# Patient Record
Sex: FEMALE | Race: WHITE | Employment: OTHER | URBAN - NONMETROPOLITAN AREA
[De-identification: names, ages, dates, MRNs, and addresses within clinical notes are randomized per-mention and may not be internally consistent; named-entity substitution may affect disease eponyms.]

---

## 2017-04-04 ENCOUNTER — APPOINTMENT (OUTPATIENT)
Dept: GENERAL RADIOLOGY | Age: 72
End: 2017-04-04
Attending: ANESTHESIOLOGY
Payer: MEDICARE

## 2017-04-04 ENCOUNTER — HOSPITAL ENCOUNTER (OUTPATIENT)
Age: 72
Setting detail: OUTPATIENT SURGERY
Discharge: HOME | End: 2017-04-04
Attending: ANESTHESIOLOGY | Admitting: ANESTHESIOLOGY
Payer: MEDICARE

## 2017-04-04 VITALS
SYSTOLIC BLOOD PRESSURE: 148 MMHG | WEIGHT: 145 LBS | HEART RATE: 74 BPM | OXYGEN SATURATION: 95 % | TEMPERATURE: 97.3 F | RESPIRATION RATE: 16 BRPM | HEIGHT: 63 IN | DIASTOLIC BLOOD PRESSURE: 78 MMHG | BODY MASS INDEX: 25.69 KG/M2

## 2017-04-04 PROCEDURE — 3600000002 HC SURGERY LEVEL 2 BASE: Performed by: ANESTHESIOLOGY

## 2017-04-04 PROCEDURE — 6360000002 HC RX W HCPCS: Performed by: ANESTHESIOLOGY

## 2017-04-04 PROCEDURE — 7100000010 HC PHASE II RECOVERY - FIRST 15 MIN: Performed by: ANESTHESIOLOGY

## 2017-04-04 PROCEDURE — 2500000003 HC RX 250 WO HCPCS: Performed by: ANESTHESIOLOGY

## 2017-04-04 PROCEDURE — 7100000011 HC PHASE II RECOVERY - ADDTL 15 MIN: Performed by: ANESTHESIOLOGY

## 2017-04-04 PROCEDURE — 77002 NEEDLE LOCALIZATION BY XRAY: CPT

## 2017-04-04 PROCEDURE — 2580000003 HC RX 258: Performed by: ANESTHESIOLOGY

## 2017-04-04 RX ORDER — BUPIVACAINE HYDROCHLORIDE 5 MG/ML
INJECTION, SOLUTION EPIDURAL; INTRACAUDAL PRN
Status: DISCONTINUED | OUTPATIENT
Start: 2017-04-04 | End: 2017-04-04 | Stop reason: HOSPADM

## 2017-04-04 RX ORDER — METHYLPREDNISOLONE ACETATE 40 MG/ML
INJECTION, SUSPENSION INTRA-ARTICULAR; INTRALESIONAL; INTRAMUSCULAR; SOFT TISSUE PRN
Status: DISCONTINUED | OUTPATIENT
Start: 2017-04-04 | End: 2017-04-04 | Stop reason: HOSPADM

## 2017-04-04 RX ORDER — MIDAZOLAM HYDROCHLORIDE 1 MG/ML
INJECTION INTRAMUSCULAR; INTRAVENOUS PRN
Status: DISCONTINUED | OUTPATIENT
Start: 2017-04-04 | End: 2017-04-04 | Stop reason: HOSPADM

## 2017-04-04 RX ORDER — SODIUM CHLORIDE, SODIUM LACTATE, POTASSIUM CHLORIDE, CALCIUM CHLORIDE 600; 310; 30; 20 MG/100ML; MG/100ML; MG/100ML; MG/100ML
INJECTION, SOLUTION INTRAVENOUS CONTINUOUS
Status: DISCONTINUED | OUTPATIENT
Start: 2017-04-04 | End: 2017-04-04 | Stop reason: HOSPADM

## 2017-04-04 RX ORDER — LIDOCAINE HYDROCHLORIDE 10 MG/ML
INJECTION, SOLUTION EPIDURAL; INFILTRATION; INTRACAUDAL; PERINEURAL PRN
Status: DISCONTINUED | OUTPATIENT
Start: 2017-04-04 | End: 2017-04-04 | Stop reason: HOSPADM

## 2017-04-04 RX ORDER — SODIUM CHLORIDE 0.9 % (FLUSH) 0.9 %
10 SYRINGE (ML) INJECTION PRN
Status: DISCONTINUED | OUTPATIENT
Start: 2017-04-04 | End: 2017-04-04 | Stop reason: HOSPADM

## 2017-04-04 RX ORDER — SODIUM CHLORIDE 0.9 % (FLUSH) 0.9 %
10 SYRINGE (ML) INJECTION EVERY 12 HOURS SCHEDULED
Status: DISCONTINUED | OUTPATIENT
Start: 2017-04-04 | End: 2017-04-04 | Stop reason: HOSPADM

## 2017-04-04 RX ADMIN — SODIUM CHLORIDE, POTASSIUM CHLORIDE, SODIUM LACTATE AND CALCIUM CHLORIDE: 600; 310; 30; 20 INJECTION, SOLUTION INTRAVENOUS at 15:02

## 2017-04-04 ASSESSMENT — PAIN DESCRIPTION - LOCATION: LOCATION: HIP

## 2017-04-04 ASSESSMENT — PAIN - FUNCTIONAL ASSESSMENT: PAIN_FUNCTIONAL_ASSESSMENT: 0-10

## 2017-04-04 ASSESSMENT — PAIN DESCRIPTION - DESCRIPTORS: DESCRIPTORS: ACHING

## 2017-04-04 ASSESSMENT — PAIN SCALES - GENERAL
PAINLEVEL_OUTOF10: 0
PAINLEVEL_OUTOF10: 0
PAINLEVEL_OUTOF10: 8

## 2017-04-04 ASSESSMENT — PAIN DESCRIPTION - ORIENTATION: ORIENTATION: RIGHT

## 2017-04-04 ASSESSMENT — PAIN DESCRIPTION - PAIN TYPE: TYPE: CHRONIC PAIN

## 2021-08-15 ENCOUNTER — APPOINTMENT (OUTPATIENT)
Dept: GENERAL RADIOLOGY | Age: 76
DRG: 563 | End: 2021-08-15
Payer: MEDICARE

## 2021-08-15 ENCOUNTER — HOSPITAL ENCOUNTER (INPATIENT)
Age: 76
LOS: 1 days | Discharge: NURSING, REHAB OR LONG-TERM CARE | DRG: 563 | End: 2021-08-19
Attending: FAMILY MEDICINE | Admitting: FAMILY MEDICINE
Payer: MEDICARE

## 2021-08-15 DIAGNOSIS — S42.91XA CLOSED FRACTURE OF RIGHT SHOULDER, INITIAL ENCOUNTER: ICD-10-CM

## 2021-08-15 DIAGNOSIS — S43.004A SHOULDER DISLOCATION, RIGHT, INITIAL ENCOUNTER: ICD-10-CM

## 2021-08-15 DIAGNOSIS — S43.004A DISLOCATION OF RIGHT SHOULDER JOINT, INITIAL ENCOUNTER: Primary | ICD-10-CM

## 2021-08-15 PROCEDURE — 99284 EMERGENCY DEPT VISIT MOD MDM: CPT

## 2021-08-15 PROCEDURE — 73030 X-RAY EXAM OF SHOULDER: CPT

## 2021-08-15 PROCEDURE — 36415 COLL VENOUS BLD VENIPUNCTURE: CPT

## 2021-08-15 PROCEDURE — 80053 COMPREHEN METABOLIC PANEL: CPT

## 2021-08-15 PROCEDURE — 6360000002 HC RX W HCPCS: Performed by: FAMILY MEDICINE

## 2021-08-15 PROCEDURE — 85025 COMPLETE CBC W/AUTO DIFF WBC: CPT

## 2021-08-15 PROCEDURE — 85730 THROMBOPLASTIN TIME PARTIAL: CPT

## 2021-08-15 PROCEDURE — 96374 THER/PROPH/DIAG INJ IV PUSH: CPT

## 2021-08-15 PROCEDURE — 96375 TX/PRO/DX INJ NEW DRUG ADDON: CPT

## 2021-08-15 RX ORDER — MORPHINE SULFATE 2 MG/ML
2 INJECTION, SOLUTION INTRAMUSCULAR; INTRAVENOUS ONCE
Status: COMPLETED | OUTPATIENT
Start: 2021-08-16 | End: 2021-08-15

## 2021-08-15 RX ORDER — ONDANSETRON 2 MG/ML
4 INJECTION INTRAMUSCULAR; INTRAVENOUS ONCE
Status: COMPLETED | OUTPATIENT
Start: 2021-08-16 | End: 2021-08-15

## 2021-08-15 RX ADMIN — ONDANSETRON 4 MG: 2 INJECTION INTRAMUSCULAR; INTRAVENOUS at 23:52

## 2021-08-15 RX ADMIN — MORPHINE SULFATE 2 MG: 2 INJECTION, SOLUTION INTRAMUSCULAR; INTRAVENOUS at 23:52

## 2021-08-15 ASSESSMENT — PAIN SCALES - GENERAL
PAINLEVEL_OUTOF10: 10
PAINLEVEL_OUTOF10: 10

## 2021-08-15 ASSESSMENT — PAIN DESCRIPTION - PAIN TYPE: TYPE: ACUTE PAIN

## 2021-08-15 ASSESSMENT — PAIN DESCRIPTION - ORIENTATION: ORIENTATION: RIGHT

## 2021-08-15 ASSESSMENT — PAIN DESCRIPTION - LOCATION: LOCATION: SHOULDER

## 2021-08-16 ENCOUNTER — APPOINTMENT (OUTPATIENT)
Dept: GENERAL RADIOLOGY | Age: 76
DRG: 563 | End: 2021-08-16
Payer: MEDICARE

## 2021-08-16 ENCOUNTER — ANESTHESIA (OUTPATIENT)
Dept: OPERATING ROOM | Age: 76
DRG: 563 | End: 2021-08-16
Payer: MEDICARE

## 2021-08-16 ENCOUNTER — ANESTHESIA EVENT (OUTPATIENT)
Dept: OPERATING ROOM | Age: 76
DRG: 563 | End: 2021-08-16
Payer: MEDICARE

## 2021-08-16 VITALS — DIASTOLIC BLOOD PRESSURE: 60 MMHG | SYSTOLIC BLOOD PRESSURE: 115 MMHG | OXYGEN SATURATION: 93 %

## 2021-08-16 PROBLEM — E87.6 HYPOKALEMIA: Status: ACTIVE | Noted: 2021-08-16

## 2021-08-16 PROBLEM — S43.004A SHOULDER DISLOCATION, RIGHT, INITIAL ENCOUNTER: Status: ACTIVE | Noted: 2021-08-16

## 2021-08-16 PROBLEM — S43.006A SHOULDER JOINT DISLOCATION: Status: ACTIVE | Noted: 2021-08-16

## 2021-08-16 LAB
ABSOLUTE EOS #: 0.04 K/UL (ref 0–0.44)
ABSOLUTE IMMATURE GRANULOCYTE: 0.08 K/UL (ref 0–0.3)
ABSOLUTE LYMPH #: 1.35 K/UL (ref 1.1–3.7)
ABSOLUTE MONO #: 0.93 K/UL (ref 0.1–1.2)
ALBUMIN SERPL-MCNC: 4.6 G/DL (ref 3.5–5.2)
ALBUMIN/GLOBULIN RATIO: 1.7 (ref 1–2.5)
ALP BLD-CCNC: 73 U/L (ref 35–104)
ALT SERPL-CCNC: 17 U/L (ref 5–33)
ANION GAP SERPL CALCULATED.3IONS-SCNC: 18 MMOL/L (ref 9–17)
AST SERPL-CCNC: 32 U/L
BASOPHILS # BLD: 1 % (ref 0–2)
BASOPHILS ABSOLUTE: 0.03 K/UL (ref 0–0.2)
BILIRUB SERPL-MCNC: 0.26 MG/DL (ref 0.3–1.2)
BUN BLDV-MCNC: 16 MG/DL (ref 8–23)
BUN/CREAT BLD: 25 (ref 9–20)
CALCIUM SERPL-MCNC: 9.2 MG/DL (ref 8.6–10.4)
CHLORIDE BLD-SCNC: 91 MMOL/L (ref 98–107)
CO2: 20 MMOL/L (ref 20–31)
CREAT SERPL-MCNC: 0.64 MG/DL (ref 0.5–0.9)
DIFFERENTIAL TYPE: ABNORMAL
EKG ATRIAL RATE: 82 BPM
EKG P AXIS: 30 DEGREES
EKG P-R INTERVAL: 192 MS
EKG Q-T INTERVAL: 370 MS
EKG QRS DURATION: 86 MS
EKG QTC CALCULATION (BAZETT): 432 MS
EKG R AXIS: 86 DEGREES
EKG T AXIS: 74 DEGREES
EKG VENTRICULAR RATE: 82 BPM
EOSINOPHILS RELATIVE PERCENT: 1 % (ref 1–4)
GFR AFRICAN AMERICAN: >60 ML/MIN
GFR NON-AFRICAN AMERICAN: >60 ML/MIN
GFR SERPL CREATININE-BSD FRML MDRD: ABNORMAL ML/MIN/{1.73_M2}
GFR SERPL CREATININE-BSD FRML MDRD: ABNORMAL ML/MIN/{1.73_M2}
GLUCOSE BLD-MCNC: 112 MG/DL (ref 70–99)
HCT VFR BLD CALC: 36.2 % (ref 36.3–47.1)
HEMOGLOBIN: 12.2 G/DL (ref 11.9–15.1)
IMMATURE GRANULOCYTES: 1 %
LYMPHOCYTES # BLD: 21 % (ref 24–43)
MCH RBC QN AUTO: 32.4 PG (ref 25.2–33.5)
MCHC RBC AUTO-ENTMCNC: 33.7 G/DL (ref 28.4–34.8)
MCV RBC AUTO: 96.3 FL (ref 82.6–102.9)
MONOCYTES # BLD: 14 % (ref 3–12)
NRBC AUTOMATED: 0 PER 100 WBC
PARTIAL THROMBOPLASTIN TIME: 29.2 SEC (ref 23.9–33.8)
PDW BLD-RTO: 11.6 % (ref 11.8–14.4)
PLATELET # BLD: 244 K/UL (ref 138–453)
PLATELET ESTIMATE: ABNORMAL
PMV BLD AUTO: 8.9 FL (ref 8.1–13.5)
POTASSIUM SERPL-SCNC: 3.5 MMOL/L (ref 3.7–5.3)
RBC # BLD: 3.76 M/UL (ref 3.95–5.11)
RBC # BLD: ABNORMAL 10*6/UL
SEG NEUTROPHILS: 62 % (ref 36–65)
SEGMENTED NEUTROPHILS ABSOLUTE COUNT: 4.08 K/UL (ref 1.5–8.1)
SODIUM BLD-SCNC: 129 MMOL/L (ref 135–144)
TOTAL PROTEIN: 7.3 G/DL (ref 6.4–8.3)
WBC # BLD: 6.5 K/UL (ref 3.5–11.3)
WBC # BLD: ABNORMAL 10*3/UL

## 2021-08-16 PROCEDURE — 6360000002 HC RX W HCPCS: Performed by: INTERNAL MEDICINE

## 2021-08-16 PROCEDURE — 3700000001 HC ADD 15 MINUTES (ANESTHESIA): Performed by: ORTHOPAEDIC SURGERY

## 2021-08-16 PROCEDURE — 94761 N-INVAS EAR/PLS OXIMETRY MLT: CPT

## 2021-08-16 PROCEDURE — 7100000010 HC PHASE II RECOVERY - FIRST 15 MIN: Performed by: ORTHOPAEDIC SURGERY

## 2021-08-16 PROCEDURE — 3600000002 HC SURGERY LEVEL 2 BASE: Performed by: ORTHOPAEDIC SURGERY

## 2021-08-16 PROCEDURE — 3E0T3BZ INTRODUCTION OF ANESTHETIC AGENT INTO PERIPHERAL NERVES AND PLEXI, PERCUTANEOUS APPROACH: ICD-10-PCS | Performed by: ORTHOPAEDIC SURGERY

## 2021-08-16 PROCEDURE — 3600000012 HC SURGERY LEVEL 2 ADDTL 15MIN: Performed by: ORTHOPAEDIC SURGERY

## 2021-08-16 PROCEDURE — 6360000002 HC RX W HCPCS

## 2021-08-16 PROCEDURE — 7100000011 HC PHASE II RECOVERY - ADDTL 15 MIN: Performed by: ORTHOPAEDIC SURGERY

## 2021-08-16 PROCEDURE — 2580000003 HC RX 258: Performed by: NURSE ANESTHETIST, CERTIFIED REGISTERED

## 2021-08-16 PROCEDURE — 2500000003 HC RX 250 WO HCPCS: Performed by: INTERNAL MEDICINE

## 2021-08-16 PROCEDURE — G0378 HOSPITAL OBSERVATION PER HR: HCPCS

## 2021-08-16 PROCEDURE — 96375 TX/PRO/DX INJ NEW DRUG ADDON: CPT

## 2021-08-16 PROCEDURE — 0RSJXZZ REPOSITION RIGHT SHOULDER JOINT, EXTERNAL APPROACH: ICD-10-PCS | Performed by: ORTHOPAEDIC SURGERY

## 2021-08-16 PROCEDURE — 2580000003 HC RX 258: Performed by: FAMILY MEDICINE

## 2021-08-16 PROCEDURE — 6370000000 HC RX 637 (ALT 250 FOR IP): Performed by: FAMILY MEDICINE

## 2021-08-16 PROCEDURE — 6360000002 HC RX W HCPCS: Performed by: FAMILY MEDICINE

## 2021-08-16 PROCEDURE — 93005 ELECTROCARDIOGRAM TRACING: CPT | Performed by: FAMILY MEDICINE

## 2021-08-16 PROCEDURE — 6370000000 HC RX 637 (ALT 250 FOR IP): Performed by: INTERNAL MEDICINE

## 2021-08-16 PROCEDURE — 96376 TX/PRO/DX INJ SAME DRUG ADON: CPT

## 2021-08-16 PROCEDURE — 2500000003 HC RX 250 WO HCPCS: Performed by: NURSE ANESTHETIST, CERTIFIED REGISTERED

## 2021-08-16 PROCEDURE — 71045 X-RAY EXAM CHEST 1 VIEW: CPT

## 2021-08-16 PROCEDURE — 6360000002 HC RX W HCPCS: Performed by: NURSE ANESTHETIST, CERTIFIED REGISTERED

## 2021-08-16 PROCEDURE — 3209999900 FLUORO FOR SURGICAL PROCEDURES

## 2021-08-16 PROCEDURE — 2500000003 HC RX 250 WO HCPCS: Performed by: FAMILY MEDICINE

## 2021-08-16 PROCEDURE — 3700000000 HC ANESTHESIA ATTENDED CARE: Performed by: ORTHOPAEDIC SURGERY

## 2021-08-16 PROCEDURE — 93010 ELECTROCARDIOGRAM REPORT: CPT | Performed by: FAMILY MEDICINE

## 2021-08-16 RX ORDER — ONDANSETRON 2 MG/ML
4 INJECTION INTRAMUSCULAR; INTRAVENOUS EVERY 4 HOURS PRN
Status: DISCONTINUED | OUTPATIENT
Start: 2021-08-16 | End: 2021-08-19 | Stop reason: HOSPADM

## 2021-08-16 RX ORDER — METOPROLOL TARTRATE 5 MG/5ML
5 INJECTION INTRAVENOUS EVERY 6 HOURS
Status: DISCONTINUED | OUTPATIENT
Start: 2021-08-16 | End: 2021-08-16

## 2021-08-16 RX ORDER — SODIUM CHLORIDE 0.9 % (FLUSH) 0.9 %
5-40 SYRINGE (ML) INJECTION EVERY 12 HOURS SCHEDULED
Status: DISCONTINUED | OUTPATIENT
Start: 2021-08-16 | End: 2021-08-19 | Stop reason: HOSPADM

## 2021-08-16 RX ORDER — POTASSIUM CHLORIDE 20 MEQ/1
40 TABLET, EXTENDED RELEASE ORAL PRN
Status: DISCONTINUED | OUTPATIENT
Start: 2021-08-16 | End: 2021-08-19 | Stop reason: HOSPADM

## 2021-08-16 RX ORDER — ONDANSETRON 2 MG/ML
4 INJECTION INTRAMUSCULAR; INTRAVENOUS EVERY 6 HOURS PRN
Status: DISCONTINUED | OUTPATIENT
Start: 2021-08-16 | End: 2021-08-16

## 2021-08-16 RX ORDER — SODIUM CHLORIDE 0.9 % (FLUSH) 0.9 %
5-40 SYRINGE (ML) INJECTION PRN
Status: DISCONTINUED | OUTPATIENT
Start: 2021-08-16 | End: 2021-08-19 | Stop reason: HOSPADM

## 2021-08-16 RX ORDER — PROPOFOL 10 MG/ML
INJECTION, EMULSION INTRAVENOUS CONTINUOUS PRN
Status: DISCONTINUED | OUTPATIENT
Start: 2021-08-16 | End: 2021-08-16 | Stop reason: SDUPTHER

## 2021-08-16 RX ORDER — MORPHINE SULFATE 2 MG/ML
INJECTION, SOLUTION INTRAMUSCULAR; INTRAVENOUS
Status: COMPLETED
Start: 2021-08-16 | End: 2021-08-16

## 2021-08-16 RX ORDER — DULOXETIN HYDROCHLORIDE 60 MG/1
60 CAPSULE, DELAYED RELEASE ORAL DAILY
Status: DISCONTINUED | OUTPATIENT
Start: 2021-08-16 | End: 2021-08-19 | Stop reason: HOSPADM

## 2021-08-16 RX ORDER — AMLODIPINE BESYLATE 5 MG/1
5 TABLET ORAL ONCE
Status: COMPLETED | OUTPATIENT
Start: 2021-08-16 | End: 2021-08-16

## 2021-08-16 RX ORDER — FENTANYL CITRATE 50 UG/ML
25 INJECTION, SOLUTION INTRAMUSCULAR; INTRAVENOUS ONCE
Status: COMPLETED | OUTPATIENT
Start: 2021-08-16 | End: 2021-08-16

## 2021-08-16 RX ORDER — DULOXETIN HYDROCHLORIDE 60 MG/1
60 CAPSULE, DELAYED RELEASE ORAL DAILY
COMMUNITY

## 2021-08-16 RX ORDER — ONDANSETRON 4 MG/1
4 TABLET, ORALLY DISINTEGRATING ORAL EVERY 8 HOURS PRN
Status: DISCONTINUED | OUTPATIENT
Start: 2021-08-16 | End: 2021-08-19 | Stop reason: HOSPADM

## 2021-08-16 RX ORDER — SODIUM CHLORIDE 9 MG/ML
25 INJECTION, SOLUTION INTRAVENOUS PRN
Status: DISCONTINUED | OUTPATIENT
Start: 2021-08-16 | End: 2021-08-19 | Stop reason: HOSPADM

## 2021-08-16 RX ORDER — ONDANSETRON 2 MG/ML
4 INJECTION INTRAMUSCULAR; INTRAVENOUS
Status: DISCONTINUED | OUTPATIENT
Start: 2021-08-16 | End: 2021-08-16

## 2021-08-16 RX ORDER — LIDOCAINE HYDROCHLORIDE 20 MG/ML
INJECTION, SOLUTION EPIDURAL; INFILTRATION; INTRACAUDAL; PERINEURAL PRN
Status: DISCONTINUED | OUTPATIENT
Start: 2021-08-16 | End: 2021-08-16 | Stop reason: SDUPTHER

## 2021-08-16 RX ORDER — ACETAMINOPHEN 325 MG/1
650 TABLET ORAL EVERY 4 HOURS PRN
Status: DISCONTINUED | OUTPATIENT
Start: 2021-08-16 | End: 2021-08-19 | Stop reason: HOSPADM

## 2021-08-16 RX ORDER — MORPHINE SULFATE 4 MG/ML
4 INJECTION, SOLUTION INTRAMUSCULAR; INTRAVENOUS EVERY 4 HOURS PRN
Status: DISCONTINUED | OUTPATIENT
Start: 2021-08-16 | End: 2021-08-19 | Stop reason: HOSPADM

## 2021-08-16 RX ORDER — METOPROLOL TARTRATE 5 MG/5ML
5 INJECTION INTRAVENOUS EVERY 6 HOURS PRN
Status: DISCONTINUED | OUTPATIENT
Start: 2021-08-16 | End: 2021-08-19 | Stop reason: HOSPADM

## 2021-08-16 RX ORDER — LISINOPRIL AND HYDROCHLOROTHIAZIDE 25; 20 MG/1; MG/1
1 TABLET ORAL DAILY
Status: DISCONTINUED | OUTPATIENT
Start: 2021-08-16 | End: 2021-08-17

## 2021-08-16 RX ORDER — SODIUM CHLORIDE, SODIUM LACTATE, POTASSIUM CHLORIDE, CALCIUM CHLORIDE 600; 310; 30; 20 MG/100ML; MG/100ML; MG/100ML; MG/100ML
INJECTION, SOLUTION INTRAVENOUS CONTINUOUS PRN
Status: DISCONTINUED | OUTPATIENT
Start: 2021-08-16 | End: 2021-08-16 | Stop reason: SDUPTHER

## 2021-08-16 RX ORDER — SODIUM CHLORIDE, SODIUM LACTATE, POTASSIUM CHLORIDE, CALCIUM CHLORIDE 600; 310; 30; 20 MG/100ML; MG/100ML; MG/100ML; MG/100ML
INJECTION, SOLUTION INTRAVENOUS CONTINUOUS
Status: DISCONTINUED | OUTPATIENT
Start: 2021-08-16 | End: 2021-08-17

## 2021-08-16 RX ORDER — LISINOPRIL AND HYDROCHLOROTHIAZIDE 25; 20 MG/1; MG/1
1 TABLET ORAL DAILY
Status: ON HOLD | COMMUNITY
End: 2021-08-19 | Stop reason: HOSPADM

## 2021-08-16 RX ORDER — ONDANSETRON 4 MG/1
4 TABLET, ORALLY DISINTEGRATING ORAL EVERY 8 HOURS PRN
Status: DISCONTINUED | OUTPATIENT
Start: 2021-08-16 | End: 2021-08-16

## 2021-08-16 RX ORDER — DEXAMETHASONE SODIUM PHOSPHATE 10 MG/ML
INJECTION INTRAMUSCULAR; INTRAVENOUS PRN
Status: DISCONTINUED | OUTPATIENT
Start: 2021-08-16 | End: 2021-08-16

## 2021-08-16 RX ORDER — FENTANYL CITRATE 50 UG/ML
INJECTION, SOLUTION INTRAMUSCULAR; INTRAVENOUS PRN
Status: DISCONTINUED | OUTPATIENT
Start: 2021-08-16 | End: 2021-08-16 | Stop reason: SDUPTHER

## 2021-08-16 RX ORDER — POTASSIUM CHLORIDE 7.45 MG/ML
10 INJECTION INTRAVENOUS PRN
Status: DISCONTINUED | OUTPATIENT
Start: 2021-08-16 | End: 2021-08-19 | Stop reason: HOSPADM

## 2021-08-16 RX ORDER — SODIUM CHLORIDE 9 MG/ML
INJECTION, SOLUTION INTRAVENOUS CONTINUOUS
Status: DISCONTINUED | OUTPATIENT
Start: 2021-08-16 | End: 2021-08-16

## 2021-08-16 RX ORDER — ROPIVACAINE HYDROCHLORIDE 5 MG/ML
INJECTION, SOLUTION EPIDURAL; INFILTRATION; PERINEURAL PRN
Status: DISCONTINUED | OUTPATIENT
Start: 2021-08-16 | End: 2021-08-16 | Stop reason: SDUPTHER

## 2021-08-16 RX ORDER — DEXAMETHASONE SODIUM PHOSPHATE 10 MG/ML
INJECTION INTRAMUSCULAR; INTRAVENOUS PRN
Status: DISCONTINUED | OUTPATIENT
Start: 2021-08-16 | End: 2021-08-16 | Stop reason: SDUPTHER

## 2021-08-16 RX ORDER — POTASSIUM CHLORIDE AND SODIUM CHLORIDE 900; 300 MG/100ML; MG/100ML
INJECTION, SOLUTION INTRAVENOUS CONTINUOUS
Status: DISCONTINUED | OUTPATIENT
Start: 2021-08-16 | End: 2021-08-16

## 2021-08-16 RX ORDER — ENALAPRILAT 2.5 MG/2ML
1.25 INJECTION INTRAVENOUS EVERY 6 HOURS PRN
Status: DISCONTINUED | OUTPATIENT
Start: 2021-08-16 | End: 2021-08-19 | Stop reason: HOSPADM

## 2021-08-16 RX ORDER — AMLODIPINE BESYLATE 5 MG/1
5 TABLET ORAL DAILY
Status: DISCONTINUED | OUTPATIENT
Start: 2021-08-16 | End: 2021-08-16

## 2021-08-16 RX ORDER — MORPHINE SULFATE 2 MG/ML
2 INJECTION, SOLUTION INTRAMUSCULAR; INTRAVENOUS ONCE
Status: COMPLETED | OUTPATIENT
Start: 2021-08-16 | End: 2021-08-16

## 2021-08-16 RX ADMIN — ONDANSETRON 4 MG: 2 INJECTION INTRAMUSCULAR; INTRAVENOUS at 06:11

## 2021-08-16 RX ADMIN — ACETAMINOPHEN 650 MG: 325 TABLET ORAL at 17:11

## 2021-08-16 RX ADMIN — MORPHINE SULFATE 4 MG: 4 INJECTION, SOLUTION INTRAMUSCULAR; INTRAVENOUS at 06:12

## 2021-08-16 RX ADMIN — AMLODIPINE BESYLATE 5 MG: 5 TABLET ORAL at 17:48

## 2021-08-16 RX ADMIN — MORPHINE SULFATE 2 MG: 2 INJECTION, SOLUTION INTRAMUSCULAR; INTRAVENOUS at 00:04

## 2021-08-16 RX ADMIN — FENTANYL CITRATE 25 MCG: 50 INJECTION, SOLUTION INTRAMUSCULAR; INTRAVENOUS at 07:36

## 2021-08-16 RX ADMIN — POTASSIUM CHLORIDE 40 MEQ: 1500 TABLET, EXTENDED RELEASE ORAL at 11:46

## 2021-08-16 RX ADMIN — MORPHINE SULFATE 4 MG: 4 INJECTION, SOLUTION INTRAMUSCULAR; INTRAVENOUS at 23:57

## 2021-08-16 RX ADMIN — DEXAMETHASONE SODIUM PHOSPHATE 10 MG: 10 INJECTION INTRAMUSCULAR; INTRAVENOUS at 08:35

## 2021-08-16 RX ADMIN — SODIUM CHLORIDE: 9 INJECTION, SOLUTION INTRAVENOUS at 02:06

## 2021-08-16 RX ADMIN — SODIUM CHLORIDE, POTASSIUM CHLORIDE, SODIUM LACTATE AND CALCIUM CHLORIDE: 600; 310; 30; 20 INJECTION, SOLUTION INTRAVENOUS at 09:04

## 2021-08-16 RX ADMIN — LIDOCAINE HYDROCHLORIDE 3 ML: 20 INJECTION, SOLUTION EPIDURAL; INFILTRATION; INTRACAUDAL; PERINEURAL at 09:07

## 2021-08-16 RX ADMIN — ROPIVACAINE HYDROCHLORIDE 15 ML: 5 INJECTION, SOLUTION EPIDURAL; INFILTRATION; PERINEURAL at 08:35

## 2021-08-16 RX ADMIN — POTASSIUM CHLORIDE AND SODIUM CHLORIDE: 900; 300 INJECTION, SOLUTION INTRAVENOUS at 07:38

## 2021-08-16 RX ADMIN — MORPHINE SULFATE 4 MG: 4 INJECTION, SOLUTION INTRAMUSCULAR; INTRAVENOUS at 02:05

## 2021-08-16 RX ADMIN — ENALAPRILAT 1.25 MG: 1.25 INJECTION INTRAVENOUS at 20:41

## 2021-08-16 RX ADMIN — METOPROLOL TARTRATE 5 MG: 5 INJECTION INTRAVENOUS at 23:56

## 2021-08-16 RX ADMIN — ONDANSETRON 4 MG: 2 INJECTION INTRAMUSCULAR; INTRAVENOUS at 14:14

## 2021-08-16 RX ADMIN — FENTANYL CITRATE 25 MCG: 50 INJECTION, SOLUTION INTRAMUSCULAR; INTRAVENOUS at 03:46

## 2021-08-16 RX ADMIN — PROPOFOL 100 MCG/KG/MIN: 10 INJECTION, EMULSION INTRAVENOUS at 09:07

## 2021-08-16 RX ADMIN — ONDANSETRON 4 MG: 2 INJECTION INTRAMUSCULAR; INTRAVENOUS at 20:48

## 2021-08-16 RX ADMIN — METOPROLOL TARTRATE 5 MG: 5 INJECTION INTRAVENOUS at 15:42

## 2021-08-16 RX ADMIN — SODIUM CHLORIDE, POTASSIUM CHLORIDE, SODIUM LACTATE AND CALCIUM CHLORIDE: 600; 310; 30; 20 INJECTION, SOLUTION INTRAVENOUS at 11:40

## 2021-08-16 RX ADMIN — DULOXETINE HYDROCHLORIDE 60 MG: 60 CAPSULE, DELAYED RELEASE ORAL at 11:40

## 2021-08-16 RX ADMIN — FENTANYL CITRATE 50 MCG: 50 INJECTION, SOLUTION INTRAMUSCULAR; INTRAVENOUS at 08:26

## 2021-08-16 RX ADMIN — LISINOPRIL AND HYDROCHLOROTHIAZIDE 1 TABLET: 25; 20 TABLET ORAL at 11:40

## 2021-08-16 ASSESSMENT — PAIN SCALES - GENERAL
PAINLEVEL_OUTOF10: 0
PAINLEVEL_OUTOF10: 0
PAINLEVEL_OUTOF10: 10
PAINLEVEL_OUTOF10: 8
PAINLEVEL_OUTOF10: 3
PAINLEVEL_OUTOF10: 10
PAINLEVEL_OUTOF10: 10
PAINLEVEL_OUTOF10: 5
PAINLEVEL_OUTOF10: 0
PAINLEVEL_OUTOF10: 5
PAINLEVEL_OUTOF10: 0
PAINLEVEL_OUTOF10: 0
PAINLEVEL_OUTOF10: 10
PAINLEVEL_OUTOF10: 0
PAINLEVEL_OUTOF10: 9
PAINLEVEL_OUTOF10: 10
PAINLEVEL_OUTOF10: 10
PAINLEVEL_OUTOF10: 3
PAINLEVEL_OUTOF10: 0

## 2021-08-16 ASSESSMENT — PAIN DESCRIPTION - PAIN TYPE
TYPE: ACUTE PAIN

## 2021-08-16 ASSESSMENT — PAIN DESCRIPTION - LOCATION
LOCATION: SHOULDER
LOCATION: HAND
LOCATION: HEAD
LOCATION: SHOULDER
LOCATION: SHOULDER
LOCATION: HEAD

## 2021-08-16 ASSESSMENT — PAIN DESCRIPTION - DESCRIPTORS
DESCRIPTORS: ACHING;DISCOMFORT;CONSTANT
DESCRIPTORS: HEADACHE
DESCRIPTORS: ACHING

## 2021-08-16 ASSESSMENT — PAIN DESCRIPTION - ORIENTATION
ORIENTATION: RIGHT

## 2021-08-16 ASSESSMENT — PAIN DESCRIPTION - FREQUENCY
FREQUENCY: CONTINUOUS
FREQUENCY: INTERMITTENT
FREQUENCY: CONTINUOUS

## 2021-08-16 ASSESSMENT — PAIN DESCRIPTION - PROGRESSION
CLINICAL_PROGRESSION: GRADUALLY WORSENING
CLINICAL_PROGRESSION: RAPIDLY WORSENING
CLINICAL_PROGRESSION: NOT CHANGED

## 2021-08-16 ASSESSMENT — PAIN - FUNCTIONAL ASSESSMENT
PAIN_FUNCTIONAL_ASSESSMENT: PREVENTS OR INTERFERES SOME ACTIVE ACTIVITIES AND ADLS
PAIN_FUNCTIONAL_ASSESSMENT: PREVENTS OR INTERFERES SOME ACTIVE ACTIVITIES AND ADLS

## 2021-08-16 ASSESSMENT — PAIN DESCRIPTION - ONSET
ONSET: GRADUAL
ONSET: ON-GOING
ONSET: ON-GOING

## 2021-08-16 ASSESSMENT — ENCOUNTER SYMPTOMS
BACK PAIN: 0
GASTROINTESTINAL NEGATIVE: 1
EYES NEGATIVE: 1
RESPIRATORY NEGATIVE: 1

## 2021-08-16 NOTE — PROGRESS NOTES
Patient vomited again at this time, tan, undigested food emesis. Patient states she is slightly nauseous but feels better after vomiting. Patient given saltine crackers and a silvia mist, denies other needs. Will continue to monitor.

## 2021-08-16 NOTE — PROGRESS NOTES
Comprehensive Nutrition Assessment    Type and Reason for Visit:  Initial    Nutrition Recommendations/Plan: continue NPO diet. Nutrition Assessment:  Increased nutrient needs r/t acute injury/trauma aeb healing needs from shoulder surgery. Pt currently in surgery. Malnutrition Assessment:  Malnutrition Status:  Insufficient data    Context:  Acute Illness     Findings of the 6 clinical characteristics of malnutrition:  Energy Intake:  Unable to assess  Weight Loss:  No significant weight loss     Body Fat Loss:  Unable to assess     Muscle Mass Loss:  Unable to assess    Fluid Accumulation:  No significant fluid accumulation     Strength:  Not Performed      Nutrition Related Findings:  unable to assess      Wounds:  Surgical Incision       Current Nutrition Therapies:    Diet NPO    Anthropometric Measures:  · Height: 5' 2\" (157.5 cm)  · Current Body Weight: 151 lb 11.2 oz (68.8 kg)   · Admission Body Weight: 151 lb 11.2 oz (68.8 kg)    · Usual Body Weight:  (no recent historical weights)     · Ideal Body Weight: 110 lbs; % Ideal Body Weight 137.9 %   · BMI: 27.7  · BMI Categories: Overweight (BMI 25.0-29. 9)       Nutrition Diagnosis:   · Increased nutrient needs related to acute injury/trauma as evidenced by wounds      Nutrition Interventions:   Food and/or Nutrient Delivery:  Continue NPO  Nutrition Education/Counseling:  No recommendation at this time   Coordination of Nutrition Care:  Continue to monitor while inpatient    Goals:  PO > 75% of meals when oral intakes resumed       Recent Labs     08/15/21  2324   *   K 3.5*   CL 91*   CO2 20   BUN 16   CREATININE 0.64   GLUCOSE 112*   ALT 17   ALKPHOS 73   GFR                 Lab Results   Component Value Date    LABALBU 4.6 08/15/2021      Nutrition Monitoring and Evaluation:   Behavioral-Environmental Outcomes:  None Identified   Food/Nutrient Intake Outcomes:  Diet Advancement/Tolerance  Physical Signs/Symptoms Outcomes:  Biochemical Data, Weight, Skin     Discharge Planning:     Too soon to determine     Electronically signed by Ashlie Keyes RD, LD on 8/16/21 at 9:56 AM EDT    Contact: [phone]

## 2021-08-16 NOTE — PROGRESS NOTES
Discussed discharge plans with the patient. Patient is a 64year old female here with rt shoulder dislocation requiring surgical intervention. She is alert, oriented , pleasant , and cooperative during our conversation. Patient is  and lives alone at [facility]. She uses no medical equipment. Patient does her own cooking and house keeping. She is independent with her ADL's. Patient manages her own medication and drives. She has no outside services in the home. Her PCP is Dr. Gabriela Yu MD. She has medical insurance that helps with medication cost.    The discharge plan is home with no services at this time. She has advance directives but not on file. LSW to monitor and assist with any needs or concerns as they arise.     SANGITA Garza

## 2021-08-16 NOTE — ED NOTES
Called Dr Elvira Peña via cell phone, connected call to Dr Lay Fisher.       Raymond Guerra  08/15/21 1711

## 2021-08-16 NOTE — ED NOTES
Called Dr Elijah Guerrero via cell phone, connected call to Dr Beni Gaston.       Costa Akhtar  08/16/21 0010

## 2021-08-16 NOTE — BRIEF OP NOTE
Brief Postoperative Note      Patient: Jean Carlos Tran  YOB: 1945  MRN: [id number]    Date of Procedure: 8/16/2021    Pre-Op Diagnosis: DISLOCATION OF RIGHT SHOULDER    Post-Op Diagnosis: Fracture dislocation right shoulder       Procedure(s):  HUMERUS CLOSED REDUCTION    Surgeon(s):  Obie Mederos MD    Assistant:  * No surgical staff found *    Anesthesia: Choice    Estimated Blood Loss (mL): 0 cc    Complications: None    Specimens:   * No specimens in log *    Implants:  * No implants in log *      Drains: * No LDAs found *    Findings:     Electronically signed by Kaylan Solano MD on 8/16/2021 at 1:29 PM

## 2021-08-16 NOTE — PROGRESS NOTES
Patient arrived back on floor from surgery at this time. Report received at bedside from surgery RN. Vitals obtained as charted. Patient is on 3 L oxygen via nasal cannula at this time. Will continue to monitor patient.

## 2021-08-16 NOTE — H&P
[address]                              HISTORY AND PHYSICAL    PATIENT NAME: Francisco BINGHAM                    :        1945  MED REC NO:   [id number]                              ROOM:       0315  ACCOUNT NO:   [de-identified]                           ADMIT DATE: 08/15/2021  PROVIDER:     Willie Diana    HISTORY OF PRESENT ILLNESS:  The patient is a 66-year-old female who  slipped and fell injuring her right shoulder last evening. She was  brought into the emergency room. Attempted reduction was performed on a  fracture dislocation of the right shoulder. This was unsuccessful and  the patient was admitted. On evaluation, the patient is holding her  right arm externally rotated and extended. Circulation and sensation  grossly intact. The patient is alert and oriented x3. X-ray shows an  anterior dislocation of the right shoulder. There is a Hill-Sachs  lesion of the humeral head and fracture of the greater tuberosity. Findings were discussed with the patient. I recommended a closed  reduction of the right shoulder. She was told that she may have a  rotator cuff tear and that she may require additional surgical  intervention in the future. The patient understands this and elected to  proceed with the closed reduction of the fracture dislocation of the  right shoulder. PAST MEDICAL HISTORY:    SURGICAL HISTORY AND MEDICATIONS:  As per chart. ALLERGIES:  CIPROFLOXACIN. SOCIAL HISTORY:  The patient does not smoke. She uses alcohol  occasionally. FAMILY HISTORY:  Noncontributory. REVIEW OF SYSTEMS:  Unremarkable except for above. PHYSICAL EXAMINATION:  GENERAL:  The patient is a 66-year-old female, alert and oriented x3. VITAL SIGNS:  As per chart. HEENT:  Eyes:  PERRLA. EOMI. Sclerae clear. Ears:  Decreased hearing  bilaterally.   Nose and throat:  No erythema, exudates, or lesions noted. NECK:  Supple. No masses or thyromegaly appreciated. HEART:  Regular rhythm and rate. LUNGS:  Clear to auscultation. ABDOMEN:  Soft. No masses, tenderness, or organomegaly appreciated. EXTREMITIES:  As per HPI. IMPRESSION:  Fracture dislocation of right shoulder. DOREEN MAYBERRY    D: 08/16/2021 13:54:05       T: 08/16/2021 15:03:22     [email or web address]  Job#: [id number]     Doc#: [id number]    CC:

## 2021-08-16 NOTE — ANESTHESIA POSTPROCEDURE EVALUATION
Department of Anesthesiology  Postprocedure Note    Patient: Tiara Metz  MRN: [id number]  [place]: 1945  Date of evaluation: 8/16/2021  Time:  2:36 PM     Procedure Summary     Date: 08/16/21 Room / Location: [address] / [facility]    Anesthesia Start: 4252 Anesthesia Stop: 2015    Procedure: HUMERUS CLOSED REDUCTION (Right ) Diagnosis: (DISLOCATION OF RIGHT SHOULDER)    Surgeons: Claudy Riddle MD Responsible Provider: PRAKASH Kovacs CRNA    Anesthesia Type: regional ASA Status: 3          Anesthesia Type: regional    David Phase I:      David Phase II: David Score: 9    Last vitals: Reviewed and per EMR flowsheets.        Anesthesia Post Evaluation    Patient location during evaluation: PACU  Patient participation: complete - patient participated  Level of consciousness: awake  Pain score: 0  Airway patency: patent  Nausea & Vomiting: no nausea and no vomiting  Complications: no  Cardiovascular status: blood pressure returned to baseline  Respiratory status: acceptable and nasal cannula (pt requiring NC O2, Dr Sabine Ambrosio notified, recommended keeping patient overnight for further monitoring and O2 )  Hydration status: euvolemic

## 2021-08-16 NOTE — PROGRESS NOTES
Pt. Arrived to [facility] alert and oriented x 4. Pt. Rates pain at 10/10. Pt. Positioned for comfort, call light in reach, assessment was performed and admission was completed. Pt. Has call light in reach, will continue to monitor.  Pt.

## 2021-08-16 NOTE — ED NOTES
Called Dr Emmy Jarquin via cell phone, connected call to Dr Dorothy Aggarwal.      Nadege Doshi  08/16/21 0009

## 2021-08-16 NOTE — PROGRESS NOTES
Writer spoke with Dr. Craig Guerrero at this time regarding orders for the patient after surgery. Per Dr. Craig Guerrero, patient can be on a general diet, order lactated ringers at 50 mL/hr, and reorder patient's cymbalta and lisinopril-HTZ.

## 2021-08-16 NOTE — ED PROVIDER NOTES
[facility] ED  EMERGENCY DEPARTMENT ENCOUNTER      Pt Name: Jannette Rosenthal  MRN: [id number]  [place] 1945  Date of evaluation: 8/15/2021  Provider: Yanni Mathew MD    CHIEF COMPLAINT     Chief Complaint   Patient presents with    Shoulder Injury     right, from fall         HISTORY OF PRESENT ILLNESS   (Location/Symptom, Timing/Onset, Context/Setting,Quality, Duration, Modifying Factors, Severity)  Note limiting factors. Jannette Rosenthal is a64 y.o. female who presents to the emergency department      Is a 7years old patient presented to ER after having fallen dislocating her right shoulder. She sustained a fall while she was at home she stood up from the table. She tells me she has had a couple beers. She reports severe pain to her right shoulder. Denies any tingling or numbness in the arms or legs. Did not hit her head or back. Nursing Notes werereviewed. REVIEW OF SYSTEMS    (2-9 systems for level 4, 10 or more for level 5)     Review of Systems   Constitutional: Negative. HENT: Negative. Eyes: Negative. Respiratory: Negative. Cardiovascular: Negative. Gastrointestinal: Negative. Endocrine: Negative. Genitourinary: Negative. Musculoskeletal: Positive for arthralgias. Negative for back pain, gait problem, joint swelling, myalgias, neck pain and neck stiffness. Neurological: Negative. Hematological: Negative. Psychiatric/Behavioral: Negative. Except as noted above the remainder of the review of systems was reviewed and negative.        PAST MEDICAL HISTORY     Past Medical History:   Diagnosis Date    Arthritis     Bell's palsy     Hyperlipidemia     Seasonal allergies          SURGICALHISTORY       Past Surgical History:   Procedure Laterality Date    APPENDECTOMY      BACK SURGERY       SECTION      COLONOSCOPY      NERVE BLOCK LUMB FACET LEVEL 1 BILATERAL Right 2017    LUMBAR FACET L-4-5,L5-S1 performed by Tawanda Don MD at [address] [facility] Right 09/06/2016    SI pain injection- Dr. Kacey Caballero Right 12/13/2016    S1 RFA    OTHER SURGICAL HISTORY  04/04/2017    pain injection         CURRENT MEDICATIONS       Previous Medications    ASPIRIN 81 MG TABLET    Take 81 mg by mouth daily    ASPIRIN-ACETAMINOPHEN-CAFFEINE (EXCEDRIN PO)    Take 2 tablets by mouth daily    COENZYME Q10 (CO Q 10) 100 MG CAPS    Take 2 tablets by mouth daily    LORATADINE 10 MG CAPS    Take by mouth daily    MULTIPLE VITAMINS-MINERALS (MULTIVITAMIN & MINERAL PO)    Take by mouth    SIMVASTATIN (ZOCOR) 20 MG TABLET    Take 20 mg by mouth nightly    VITAMIN E 1000 UNITS CAPSULE    Take 1,000 Units by mouth daily    ZOLPIDEM (AMBIEN) 5 MG TABLET    Take 5 mg by mouth nightly as needed for Sleep            Ciprofloxacin    FAMILY HISTORY     No family history on file. SOCIAL HISTORY       Social History     Socioeconomic History    Marital status:      Spouse name: Not on file    Number of children: Not on file    Years of education: Not on file    Highest education level: Not on file   Occupational History    Not on file   Tobacco Use    Smoking status: Never Smoker    Smokeless tobacco: Never Used   Substance and Sexual Activity    Alcohol use: Yes     Alcohol/week: 10.0 standard drinks     Types: 10 Cans of beer per week    Drug use: No    Sexual activity: Not on file   Other Topics Concern    Not on file   Social History Narrative    Not on file     Social Determinants of Health     Financial Resource Strain:     Difficulty of Paying Living Expenses:    Food Insecurity:     Worried About Running Out of Food in the Last Year:     [address] in the Last Year:    Transportation Needs:     Lack of Transportation (Medical):      Lack of Transportation (Non-Medical):    Physical Activity:     Days of Exercise per Week:     Minutes of Exercise per Session:    Stress:     Feeling of Stress :    Social Connections:     Frequency of Communication with Friends and Family:     Frequency of Social Gatherings with Friends and Family:     Attends Confucianist Services:     Active Member of Clubs or Organizations:     Attends Club or Organization Meetings:     Marital Status:    Intimate Partner Violence:     Fear of Current or Ex-Partner:     Emotionally Abused:     Physically Abused:     Sexually Abused:        SCREENINGS                    PHYSICAL EXAM    (up to 7 for level 4, 8 or more for level 5)     ED Triage Vitals [08/15/21 2239]   BP Temp Temp Source Pulse Resp SpO2 Height Weight   (!) 176/80 97.7 °F (36.5 °C) Tympanic 69 16 97 % -- 140 lb (63.5 kg)       Physical Exam  Vitals and nursing note reviewed. Constitutional:       General: She is not in acute distress. Appearance: Normal appearance. She is not ill-appearing, toxic-appearing or diaphoretic. HENT:      Head: Normocephalic and atraumatic. Mouth/Throat:      Mouth: Mucous membranes are moist.   Cardiovascular:      Rate and Rhythm: Normal rate and regular rhythm. Pulses: Normal pulses. Heart sounds: Normal heart sounds. Pulmonary:      Effort: Pulmonary effort is normal.      Breath sounds: Normal breath sounds. Musculoskeletal:         General: Tenderness, deformity and signs of injury present. No swelling. Cervical back: Normal range of motion and neck supple. Right lower leg: Edema present. Left lower leg: No edema. Comments: Tender palpation right shoulder decreased range of motion due to pain. Humeral head appears to be out of position and into the right axilla. Skin:     General: Skin is warm. Capillary Refill: Capillary refill takes less than 2 seconds. Neurological:      General: No focal deficit present. Mental Status: She is alert.          DIAGNOSTIC RESULTS     EKG: All EKG's are interpreted by the Emergency Department Physician who either signs orCo-signs this chart in the absence of a cardiologist.        RADIOLOGY:   plain film images such as CT, Ultrasound and MRI are read by the radiologist. Plain radiographic images are visualized and preliminarily interpreted by the emergency physician with the below findings:        Interpretation per the Radiologist below, ifavailable at the time of this note:    XR SHOULDER RIGHT (MIN 2 VIEWS)   Final Result   Anterior glenohumeral dislocation with an acute significantly displaced   fracture of the greater tuberosity. ED BEDSIDE ULTRASOUND:   Performed by ED Physician - none    LABS:  Labs Reviewed - No data to display    All other labs were within normal range ornot returned as of this dictation. EMERGENCY DEPARTMENT COURSE and DIFFERENTIAL DIAGNOSIS/MDM:   Vitals:    Vitals:    08/15/21 2239 08/15/21 2245 08/16/21 0007   BP: (!) 176/80 (!) 141/79 (!) 144/62   Pulse: 69     Resp: 16     Temp: 97.7 °F (36.5 °C)     TempSrc: Tympanic     SpO2: 97% 99% 93%   Weight: 140 lb (63.5 kg)             MDM  Number of Diagnoses or Management Options  Closed fracture of right shoulder, initial encounter  Dislocation of right shoulder joint, initial encounter  Diagnosis management comments: Patient with dislocated right shoulder along with a fracture of the greater tuberosity. Within the reduction however we were unsuccessful. I discussed with Dr. Zahira Walton the orthopedic doctor on-call and he suggested admission to the hospitalist and he will see her in the morning to attempt an OR reduction. This was communicated the patient and the family. CRITICAL CARE TIME   Total CriticalCare time was  minutes, excluding separately reportable procedures. There was a high probability of clinically significant/life threatening deterioration in the patient's condition which required my urgent intervention.     CONSULTS:  None    PROCEDURES:  Unlessotherwise noted below, none     Procedures    FINAL IMPRESSION

## 2021-08-16 NOTE — ANESTHESIA PROCEDURE NOTES
Peripheral Block    Patient location during procedure: pre-op  Start time: 8/16/2021 8:27 AM  End time: 8/16/2021 8:35 AM  Staffing  Performed: resident/CRNA   Resident/CRNA: Rande Osler, APRN - CRNA  Preanesthetic Checklist  Completed: patient identified, IV checked, site marked, risks and benefits discussed, surgical consent, monitors and equipment checked, pre-op evaluation, timeout performed, anesthesia consent given, oxygen available and patient being monitored  Peripheral Block  Patient position: sitting  Prep: ChloraPrep  Patient monitoring: continuous pulse ox, frequent blood pressure checks and IV access  Block type: Brachial plexus  Laterality: right  Injection technique: single-shot  Guidance: ultrasound guided  Local infiltration: decadron and ropivacaine  Interscalene  Provider prep: mask and sterile gloves  Local infiltration: decadron and ropivacaine  Needle  Needle type: combined needle/nerve stimulator   Needle gauge: 22 G  Needle length: 5 cm  Needle localization: ultrasound guidance and nerve stimulator  Assessment  Injection assessment: negative aspiration for heme, no paresthesia on injection and local visualized surrounding nerve on ultrasound  Paresthesia pain: immediately resolved (imediately resolved, with repositoning of needle)  Slow fractionated injection: yes  Hemodynamics: stable  Additional Notes  +nerve stim with arm twitch at 0.4mA, as confirmation after ultrasound placement  Patient tolerated wel  Reason for block: post-op pain management, primary anesthetic and at surgeon's request

## 2021-08-16 NOTE — PROGRESS NOTES
Writer spoke with [facility] pharmacy at this time regarding patient's medications. Home medication list updated.

## 2021-08-16 NOTE — PROGRESS NOTES
Dr. Boris Camp called again to ask for clarification on pain medications. Per Dr. Boris Camp ok for nurse to give pain meds at this time.

## 2021-08-16 NOTE — ANESTHESIA PRE PROCEDURE
Department of Anesthesiology  Preprocedure Note       Name:  Dwain Schaumann   Age:  64 y.o.  :  1945                                          MRN:  [id number]         Date:  2021      Surgeon: Heidy Winslow):  Jesse Medina MD    Procedure: Procedure(s):  HUMERUS CLOSED REDUCTION    Medications prior to admission:   Prior to Admission medications    Medication Sig Start Date End Date Taking?  Authorizing Provider   simvastatin (ZOCOR) 20 MG tablet Take 20 mg by mouth nightly   Yes Historical Provider, MD   Aspirin-Acetaminophen-Caffeine (EXCEDRIN PO) Take 2 tablets by mouth daily   Yes Historical Provider, MD   vitamin E 1000 UNITS capsule Take 1,000 Units by mouth daily   Yes Historical Provider, MD   Multiple Vitamins-Minerals (MULTIVITAMIN & MINERAL PO) Take by mouth   Yes Historical Provider, MD   Loratadine 10 MG CAPS Take by mouth daily   Yes Historical Provider, MD   aspirin 81 MG tablet Take 81 mg by mouth daily   Yes Historical Provider, MD   Coenzyme Q10 (CO Q 10) 100 MG CAPS Take 2 tablets by mouth daily    Historical Provider, MD   zolpidem (AMBIEN) 5 MG tablet Take 5 mg by mouth nightly as needed for Sleep    Historical Provider, MD       Current medications:    Current Facility-Administered Medications   Medication Dose Route Frequency Provider Last Rate Last Admin    [facility] Hold] morphine injection 4 mg  4 mg Intravenous Q4H PRN Manuelakkumar Parish Borja MD   4 mg at 21 0612    [MAR Hold] ondansetron (ZOFRAN) injection 4 mg  4 mg Intravenous Q6H PRN Vinicio Simmons MD   4 mg at 21 0611    [MAR Hold] sodium chloride flush 0.9 % injection 5-40 mL  5-40 mL Intravenous 2 times per day Vinicio Simmons MD        [facility] Hold] sodium chloride flush 0.9 % injection 5-40 mL  5-40 mL Intravenous PRN Vinicio Simmons MD        [facility] Hold] 0.9 % sodium chloride infusion  25 mL Intravenous PRN Vinicio Simmons MD        [facility] Hold] potassium chloride (KLOR-CON M) extended release tablet 40 mEq  40 mEq Oral PRN Mandeep Bhatti MD        Or   [facility] Hold] potassium bicarb-citric acid (EFFER-K) effervescent tablet 40 mEq  40 mEq Oral PRN Mandeep Bhatti MD        Or   [facility] Hold] potassium chloride 10 mEq/100 mL IVPB (Peripheral Line)  10 mEq Intravenous PRN Mandeep Bhatti MD        [facility] Hold] ondansetron (ZOFRAN-ODT) disintegrating tablet 4 mg  4 mg Oral Q8H PRN Mandeep Bhatti MD        Or   [facility] Hold] ondansetron (ZOFRAN) injection 4 mg  4 mg Intravenous Q6H PRN Mandeep Bhatti MD        [facility] Hold] 0.9% NaCl with KCl 40 mEq infusion   Intravenous Continuous Mandeep Bhatti  mL/hr at 21 0738 New Bag at 21 0341       Allergies: Allergies   Allergen Reactions    Ciprofloxacin Swelling       Problem List:    Patient Active Problem List   Diagnosis Code    Lumbar post-laminectomy syndrome M96.1    [facility] M[facility].1    Lumbosacral spondylosis without myelopathy M47.817    Shoulder joint dislocation S43.006A    Shoulder dislocation, right, initial encounter S42.80A    Hypokalemia E87.6       Past Medical History:        Diagnosis Date    Arthritis     Bell's palsy     Hyperlipidemia     Seasonal allergies        Past Surgical History:        Procedure Laterality Date    APPENDECTOMY      BACK SURGERY       SECTION      COLONOSCOPY      NERVE BLOCK LUMB FACET LEVEL 1 BILATERAL Right 2017    LUMBAR FACET L-4-5,L5-S1 performed by Windy Kim MD at [place] [address] Right 2016    SI pain injection- Dr. Cindy Fitzpatrick Right 2016    S1 RFA    OTHER SURGICAL HISTORY  2017    pain injection       Social History:    Social History     Tobacco Use    Smoking status: Never Smoker    Smokeless tobacco: Never Used   Substance Use Topics    Alcohol use:  Yes     Alcohol/week: 10.0 standard drinks     Types: 10 Cans of beer per week Counseling given: Not Answered      Vital Signs (Current):   Vitals:    08/15/21 2245 08/16/21 0007 08/16/21 0045 08/16/21 0651   BP: (!) 141/79 (!) 144/62 (!) 156/73 (!) 176/86   Pulse:   76 88   Resp:   18 18   Temp:   36.5 °C (97.7 °F) 37 °C (98.6 °F)   TempSrc:   Temporal Temporal   SpO2: 99% 93% 94% 96%   Weight:   151 lb 11.2 oz (68.8 kg)    Height:   5' 2\" (1.575 m)                                               BP Readings from Last 3 Encounters:   08/16/21 (!) 176/86   04/04/17 (!) 148/78   12/13/16 (!) 178/88       NPO Status: Time of last liquid consumption: 2345                        Time of last solid consumption: 1800                        Date of last liquid consumption: 08/15/21                        Date of last solid food consumption: 08/15/21    BMI:   Wt Readings from Last 3 Encounters:   08/16/21 151 lb 11.2 oz (68.8 kg)   04/04/17 145 lb (65.8 kg)   12/13/16 144 lb (65.3 kg)     Body mass index is 27.75 kg/m². CBC:   Lab Results   Component Value Date    WBC 6.5 08/15/2021    RBC 3.76 08/15/2021    HGB 12.2 08/15/2021    HCT 36.2 08/15/2021    MCV 96.3 08/15/2021    RDW 11.6 08/15/2021     08/15/2021       CMP:   Lab Results   Component Value Date     08/15/2021    K 3.5 08/15/2021    CL 91 08/15/2021    CO2 20 08/15/2021    BUN 16 08/15/2021    CREATININE 0.64 08/15/2021    GFRAA >60 08/15/2021    LABGLOM >60 08/15/2021    GLUCOSE 112 08/15/2021    PROT 7.3 08/15/2021    CALCIUM 9.2 08/15/2021    BILITOT 0.26 08/15/2021    ALKPHOS 73 08/15/2021    AST 32 08/15/2021    ALT 17 08/15/2021       POC Tests: No results for input(s): POCGLU, POCNA, POCK, POCCL, POCBUN, POCHEMO, POCHCT in the last 72 hours. Coags:   Lab Results   Component Value Date    APTT 29.2 08/15/2021       HCG (If Applicable): No results found for: PREGTESTUR, PREGSERUM, HCG, HCGQUANT     ABGs: No results found for: PHART, PO2ART, XHX7LCO, SIB6UST, BEART, H0IRWLEG     Type & Screen (If Applicable):   No results found for: Juvencio Fuller    Drug/Infectious Status (If Applicable):  No results found for: HIV, HEPCAB    COVID-19 Screening (If Applicable): No results found for: COVID19        Anesthesia Evaluation  Patient summary reviewed and Nursing notes reviewed no history of anesthetic complications:   Airway: Mallampati: IV  TM distance: >3 FB   Neck ROM: full  Mouth opening: > = 3 FB Dental: normal exam         Pulmonary:Negative Pulmonary ROS and normal exam  breath sounds clear to auscultation                             Cardiovascular:  Exercise tolerance: good (>4 METS),   (+) hypertension:, hyperlipidemia        Rhythm: regular  Rate: normal                    Neuro/Psych:      (-) neuromuscular disease            ROS comment: Bell's palsy GI/Hepatic/Renal:   (+) GERD: poorly controlled,           Endo/Other:    (+) : arthritis: OA., .                  ROS comment: patient has lead wires on bilat side of spine for pain control. Was planning for ablation of spinal nerves tomorrow with Dr. Mariam Lopez Abdominal:             Vascular: negative vascular ROS. Other Findings: Patient states she drank \"a couple beers\" and fell on [place], resulting in dislocated shoulder          Anesthesia Plan      regional     ASA 3             Anesthetic plan and risks discussed with patient (Plan for ISB with sedation).                       PRAKASH Nesbitt - IRVIN   8/16/2021

## 2021-08-16 NOTE — PROGRESS NOTES
Spoke with Dr. Inna López via telephone at this time regarding patients elevated blood pressures and nausea, per Dr. Inna López, order a one time dose of 5 mg of norvasc and change frequency of zofran to every 3 hours. See orders for details. (4) rarely moist

## 2021-08-16 NOTE — H&P
[facility]  History and Physical        Patient:  Girma Gómez  MRN: [id number]    Chief Complaint:    Chief Complaint   Patient presents with    Shoulder Injury     right, from fall       History Obtained From:  patient, electronic medical record  PCP: Canelo Rolon MD    History of Present Illness: The patient is a 64 y.o. female who presents with rt shoulder pain. She states normally drinks only one beer but had three beer last night and lost balance and fell out side and hurt rt shoulder. She came to er and er physician tried to reduce but could not and hence admitted. she continues to have significant pain    Past Medical History:        Diagnosis Date    Arthritis     Bell's palsy     Hyperlipidemia     Seasonal allergies        Past Surgical History:        Procedure Laterality Date    APPENDECTOMY      BACK SURGERY       SECTION      COLONOSCOPY      NERVE BLOCK LUMB FACET LEVEL 1 BILATERAL Right 2017    LUMBAR FACET L-4-5,L5-S1 performed by Lenka Thomas MD at [address] [facility] Right 2016    SI pain injection- Dr. Manuel Mendez Right 2016    S1 RFA    OTHER SURGICAL HISTORY  2017    pain injection       Medications Prior to Admission:    Prior to Admission medications    Medication Sig Start Date End Date Taking?  Authorizing Provider   simvastatin (ZOCOR) 20 MG tablet Take 20 mg by mouth nightly   Yes Historical Provider, MD   Aspirin-Acetaminophen-Caffeine (EXCEDRIN PO) Take 2 tablets by mouth daily   Yes Historical Provider, MD   vitamin E 1000 UNITS capsule Take 1,000 Units by mouth daily   Yes Historical Provider, MD   Multiple Vitamins-Minerals (MULTIVITAMIN & MINERAL PO) Take by mouth   Yes Historical Provider, MD   Loratadine 10 MG CAPS Take by mouth daily   Yes Historical Provider, MD   aspirin 81 MG tablet Take 81 mg by mouth daily   Yes Historical Provider, MD   Coenzyme Q10 (CO Q 10) 100 MG CAPS Take 2 tablets by mouth daily    Historical Provider, MD   zolpidem (AMBIEN) 5 MG tablet Take 5 mg by mouth nightly as needed for Sleep    Historical Provider, MD       Allergies:  Ciprofloxacin    Social History:   TOBACCO:   reports that she has never smoked. She has never used smokeless tobacco.  ETOH:   reports current alcohol use of about 10.0 standard drinks of alcohol per week. Family History:   History reviewed. No pertinent family history. Review of Systems:  Constitutional:negative  for fevers, and negative for chills. Respiratory: negative for shortness of breath, negative for cough, and negative for wheezing  Cardiovascular: negative for chest pain, negative for palpitations, and negative for syncope  Gastrointestinal: negative for abdominal pain, negative for nausea,negative for vomiting, negative for diarrhea, negative for constipation, and negative for hematochezia or melena  Genitourinary: negative for dysuria, negative for urinary urgency, negative for urinary frequency, and negative for hematuria  Neurological: negative for unilateral weakness, numbness or tingling. Has rt shoulder pain  All other systems were reviewed with the patient and are negative except as stated    Objective:    Vitals:   Temp: 98.6 °F (37 °C)  BP: (!) 176/86 (pt in 10/10 pain - will recheck)  Resp: 18  Pulse: 88  SpO2: 96 %  -----------------------------------------------------------------  Exam:  GEN:    Awake, alert and oriented x3. EYES:  EOMI, pupils equal   NECK: Supple. No lymphadenopathy. No carotid bruit  CVS:    regular rate and rhythm, no audible murmur  PULM:  CTA, no wheezes, rales or rhonchi, no acute respiratory distress  ABD:    Bowels sounds normal.  Abdomen is soft. No distention. no tenderness to palpation. EXT:  Rt shoulder- movements very painful,has defirmity no edema bilaterally . No calf tenderness. NEURO: Moves all extremities.   Motor and sensory are grossly intact  SKIN: No rashes. No skin lesions.    -----------------------------------------------------------------  Diagnostic Data:   · All diagnostic data was reviewed  Lab Results   Component Value Date    WBC 6.5 08/15/2021    HGB 12.2 08/15/2021    MCV 96.3 08/15/2021     08/15/2021      Lab Results   Component Value Date    GLUCOSE 112 (H) 08/15/2021    BUN 16 08/15/2021    CREATININE 0.64 08/15/2021     (L) 08/15/2021    K 3.5 (L) 08/15/2021    CALCIUM 9.2 08/15/2021    CL 91 (L) 08/15/2021    CO2 20 08/15/2021     No results found for: [address], RBCUA, EPITHUA, LEUKOCYTESUR, SPECGRAV, GLUCOSEU, KETUA, PROTEINU, HGBUR, CASTUA, CRYSTUA, BACTERIA, YEAST    Assessment:     Principal Problem:    Shoulder dislocation, right, initial encounter  Active Problems:    Shoulder joint dislocation    Hypokalemia  Resolved Problems:    * No resolved hospital problems. *      Plan:     Problem List     None           · This patient requires overnight observation because of rt shoulder dislocation requiring surgical intervention  · Factors affecting the medical complexity of this patient include hypokalemia  · Estimated length of stay is 1 days  · Discussed patient's symptoms and data results including labs and imaging studies with the ER MD at time of admission  · High risk drug monitoring: none  ·     CORE MEASURES  · DVT prophylaxis: SCD  · Decubitus ulcer present on admission: No  · CODE STATUS: FULL CODE  · Nutrition Status: good   · Physical therapy: Yes   · Old Charts reviewed: Yes  · EKG Reviewed:  Yes  · Advance Directive Addressed: Yes - padillas Felton vinson MD , M.DLex  8/16/2021  7:15 AM

## 2021-08-17 PROBLEM — E87.1 HYPONATREMIA: Status: ACTIVE | Noted: 2021-08-17

## 2021-08-17 LAB
ABSOLUTE EOS #: 0 K/UL (ref 0–0.44)
ABSOLUTE IMMATURE GRANULOCYTE: 0 K/UL (ref 0–0.3)
ABSOLUTE LYMPH #: 0.6 K/UL (ref 1.1–3.7)
ABSOLUTE MONO #: 0.75 K/UL (ref 0.1–1.2)
ANION GAP SERPL CALCULATED.3IONS-SCNC: 13 MMOL/L (ref 9–17)
BASOPHILS # BLD: 0 % (ref 0–2)
BASOPHILS ABSOLUTE: 0 K/UL (ref 0–0.2)
BUN BLDV-MCNC: 14 MG/DL (ref 8–23)
BUN/CREAT BLD: 23 (ref 9–20)
CALCIUM SERPL-MCNC: 9.6 MG/DL (ref 8.6–10.4)
CHLORIDE BLD-SCNC: 89 MMOL/L (ref 98–107)
CO2: 24 MMOL/L (ref 20–31)
CREAT SERPL-MCNC: 0.6 MG/DL (ref 0.5–0.9)
DIFFERENTIAL TYPE: ABNORMAL
EOSINOPHILS RELATIVE PERCENT: 0 % (ref 1–4)
GFR AFRICAN AMERICAN: >60 ML/MIN
GFR NON-AFRICAN AMERICAN: >60 ML/MIN
GFR SERPL CREATININE-BSD FRML MDRD: ABNORMAL ML/MIN/{1.73_M2}
GFR SERPL CREATININE-BSD FRML MDRD: ABNORMAL ML/MIN/{1.73_M2}
GLUCOSE BLD-MCNC: 124 MG/DL (ref 70–99)
HCT VFR BLD CALC: 34.1 % (ref 36.3–47.1)
HEMOGLOBIN: 11.5 G/DL (ref 11.9–15.1)
IMMATURE GRANULOCYTES: 0 %
LYMPHOCYTES # BLD: 8 % (ref 24–43)
MCH RBC QN AUTO: 32.2 PG (ref 25.2–33.5)
MCHC RBC AUTO-ENTMCNC: 33.7 G/DL (ref 28.4–34.8)
MCV RBC AUTO: 95.5 FL (ref 82.6–102.9)
MONOCYTES # BLD: 10 % (ref 3–12)
MORPHOLOGY: NORMAL
NRBC AUTOMATED: 0 PER 100 WBC
PDW BLD-RTO: 11.5 % (ref 11.8–14.4)
PLATELET # BLD: 220 K/UL (ref 138–453)
PLATELET ESTIMATE: ABNORMAL
PMV BLD AUTO: 8.7 FL (ref 8.1–13.5)
POTASSIUM SERPL-SCNC: 4.4 MMOL/L (ref 3.7–5.3)
RBC # BLD: 3.57 M/UL (ref 3.95–5.11)
RBC # BLD: ABNORMAL 10*6/UL
SARS-COV-2, RAPID: NOT DETECTED
SEG NEUTROPHILS: 82 % (ref 36–65)
SEGMENTED NEUTROPHILS ABSOLUTE COUNT: 6.15 K/UL (ref 1.5–8.1)
SODIUM BLD-SCNC: 126 MMOL/L (ref 135–144)
SPECIMEN DESCRIPTION: NORMAL
WBC # BLD: 7.5 K/UL (ref 3.5–11.3)
WBC # BLD: ABNORMAL 10*3/UL

## 2021-08-17 PROCEDURE — 97110 THERAPEUTIC EXERCISES: CPT

## 2021-08-17 PROCEDURE — 85025 COMPLETE CBC W/AUTO DIFF WBC: CPT

## 2021-08-17 PROCEDURE — 97535 SELF CARE MNGMENT TRAINING: CPT

## 2021-08-17 PROCEDURE — 2580000003 HC RX 258: Performed by: FAMILY MEDICINE

## 2021-08-17 PROCEDURE — G0378 HOSPITAL OBSERVATION PER HR: HCPCS

## 2021-08-17 PROCEDURE — 96372 THER/PROPH/DIAG INJ SC/IM: CPT

## 2021-08-17 PROCEDURE — 80048 BASIC METABOLIC PNL TOTAL CA: CPT

## 2021-08-17 PROCEDURE — 97166 OT EVAL MOD COMPLEX 45 MIN: CPT

## 2021-08-17 PROCEDURE — 96375 TX/PRO/DX INJ NEW DRUG ADDON: CPT

## 2021-08-17 PROCEDURE — C9803 HOPD COVID-19 SPEC COLLECT: HCPCS

## 2021-08-17 PROCEDURE — 6370000000 HC RX 637 (ALT 250 FOR IP): Performed by: FAMILY MEDICINE

## 2021-08-17 PROCEDURE — 6360000002 HC RX W HCPCS: Performed by: FAMILY MEDICINE

## 2021-08-17 PROCEDURE — 2500000003 HC RX 250 WO HCPCS: Performed by: INTERNAL MEDICINE

## 2021-08-17 PROCEDURE — 87635 SARS-COV-2 COVID-19 AMP PRB: CPT

## 2021-08-17 PROCEDURE — 96376 TX/PRO/DX INJ SAME DRUG ADON: CPT

## 2021-08-17 PROCEDURE — 94761 N-INVAS EAR/PLS OXIMETRY MLT: CPT

## 2021-08-17 PROCEDURE — 6370000000 HC RX 637 (ALT 250 FOR IP): Performed by: INTERNAL MEDICINE

## 2021-08-17 PROCEDURE — 36415 COLL VENOUS BLD VENIPUNCTURE: CPT

## 2021-08-17 RX ORDER — CALCIUM CARBONATE 200(500)MG
500 TABLET,CHEWABLE ORAL 3 TIMES DAILY PRN
Status: DISCONTINUED | OUTPATIENT
Start: 2021-08-17 | End: 2021-08-19 | Stop reason: HOSPADM

## 2021-08-17 RX ORDER — OXYCODONE HYDROCHLORIDE AND ACETAMINOPHEN 5; 325 MG/1; MG/1
1 TABLET ORAL EVERY 6 HOURS PRN
Status: DISCONTINUED | OUTPATIENT
Start: 2021-08-17 | End: 2021-08-19 | Stop reason: HOSPADM

## 2021-08-17 RX ORDER — DOCUSATE SODIUM 100 MG/1
100 CAPSULE, LIQUID FILLED ORAL DAILY
Status: DISCONTINUED | OUTPATIENT
Start: 2021-08-17 | End: 2021-08-19 | Stop reason: HOSPADM

## 2021-08-17 RX ORDER — LISINOPRIL 20 MG/1
20 TABLET ORAL DAILY
Status: DISCONTINUED | OUTPATIENT
Start: 2021-08-18 | End: 2021-08-19 | Stop reason: HOSPADM

## 2021-08-17 RX ORDER — AMLODIPINE BESYLATE 10 MG/1
10 TABLET ORAL DAILY
Status: DISCONTINUED | OUTPATIENT
Start: 2021-08-17 | End: 2021-08-19 | Stop reason: HOSPADM

## 2021-08-17 RX ADMIN — OXYCODONE HYDROCHLORIDE AND ACETAMINOPHEN 1 TABLET: 5; 325 TABLET ORAL at 14:12

## 2021-08-17 RX ADMIN — SODIUM CHLORIDE, POTASSIUM CHLORIDE, SODIUM LACTATE AND CALCIUM CHLORIDE: 600; 310; 30; 20 INJECTION, SOLUTION INTRAVENOUS at 04:25

## 2021-08-17 RX ADMIN — ENALAPRILAT 1.25 MG: 1.25 INJECTION INTRAVENOUS at 02:44

## 2021-08-17 RX ADMIN — OXYCODONE HYDROCHLORIDE AND ACETAMINOPHEN 1 TABLET: 5; 325 TABLET ORAL at 20:14

## 2021-08-17 RX ADMIN — SODIUM CHLORIDE, PRESERVATIVE FREE 10 ML: 5 INJECTION INTRAVENOUS at 20:15

## 2021-08-17 RX ADMIN — DULOXETINE HYDROCHLORIDE 60 MG: 60 CAPSULE, DELAYED RELEASE ORAL at 07:16

## 2021-08-17 RX ADMIN — MORPHINE SULFATE 4 MG: 4 INJECTION, SOLUTION INTRAMUSCULAR; INTRAVENOUS at 23:30

## 2021-08-17 RX ADMIN — DOCUSATE SODIUM 100 MG: 100 CAPSULE, LIQUID FILLED ORAL at 07:17

## 2021-08-17 RX ADMIN — MORPHINE SULFATE 4 MG: 4 INJECTION, SOLUTION INTRAMUSCULAR; INTRAVENOUS at 04:25

## 2021-08-17 RX ADMIN — ANTACID TABLETS 500 MG: 500 TABLET, CHEWABLE ORAL at 00:20

## 2021-08-17 RX ADMIN — ENOXAPARIN SODIUM 40 MG: 40 INJECTION SUBCUTANEOUS at 09:57

## 2021-08-17 RX ADMIN — AMLODIPINE BESYLATE 10 MG: 10 TABLET ORAL at 07:16

## 2021-08-17 RX ADMIN — OXYCODONE HYDROCHLORIDE AND ACETAMINOPHEN 1 TABLET: 5; 325 TABLET ORAL at 07:17

## 2021-08-17 RX ADMIN — LISINOPRIL AND HYDROCHLOROTHIAZIDE 1 TABLET: 25; 20 TABLET ORAL at 07:17

## 2021-08-17 RX ADMIN — MORPHINE SULFATE 4 MG: 4 INJECTION, SOLUTION INTRAMUSCULAR; INTRAVENOUS at 10:01

## 2021-08-17 ASSESSMENT — PAIN DESCRIPTION - DESCRIPTORS
DESCRIPTORS: THROBBING

## 2021-08-17 ASSESSMENT — PAIN DESCRIPTION - PAIN TYPE
TYPE: ACUTE PAIN

## 2021-08-17 ASSESSMENT — PAIN DESCRIPTION - PROGRESSION
CLINICAL_PROGRESSION: NOT CHANGED
CLINICAL_PROGRESSION: NOT CHANGED

## 2021-08-17 ASSESSMENT — PAIN DESCRIPTION - FREQUENCY
FREQUENCY: CONTINUOUS

## 2021-08-17 ASSESSMENT — PAIN DESCRIPTION - ORIENTATION
ORIENTATION: RIGHT

## 2021-08-17 ASSESSMENT — PAIN DESCRIPTION - LOCATION
LOCATION: SHOULDER

## 2021-08-17 ASSESSMENT — PAIN DESCRIPTION - ONSET
ONSET: ON-GOING

## 2021-08-17 ASSESSMENT — PAIN SCALES - GENERAL
PAINLEVEL_OUTOF10: 9
PAINLEVEL_OUTOF10: 6
PAINLEVEL_OUTOF10: 9
PAINLEVEL_OUTOF10: 10
PAINLEVEL_OUTOF10: 10
PAINLEVEL_OUTOF10: 9

## 2021-08-17 NOTE — DISCHARGE INSTR - COC
Continuity of Care Form    Patient Name: Jannette Rosetnhal   :  1945  MRN:  [id number]    Admit date:  8/15/2021  Discharge date:      Code Status Order: Full Code   Advance Directives:   Advance Care Flowsheet Documentation     Date/Time Healthcare Directive Type of Healthcare Directive Copy in [address] Agent's Name Healthcare Agent's Phone Number    [phone]  No, patient does not have an advance directive for healthcare treatment  --  --  --  --  --          Admitting Physician:  Pamela Colunga MD  PCP: Bryanna Saleh MD    Discharging Nurse: [address] [place] [address] Unit/Room#: 0315/0315-01  Discharging Unit Phone Number: [phone]    Emergency Contact:   Extended Emergency Contact Information  Primary Emergency Contact: Lizzy Baeza, [address] [place] [address] Phone: [phone]  Relation: Child    Past Surgical History:  Past Surgical History:   Procedure Laterality Date    APPENDECTOMY      BACK SURGERY       SECTION      [address]ONOSCOPY     [address] Right 2021    HUMERUS CLOSED REDUCTION Right 2021    HUMERUS CLOSED REDUCTION performed by Kendall Palma MD at [address] [place] [address] FACET LEVEL 1 BILATERAL Right 2017    LUMBAR FACET L-4-5,L5-S1 performed by Francisco Neri MD at [facility] Right 2016    SI pain injection- Dr. Oliver Srinivasan Right 2016    S1 RFA    OTHER SURGICAL HISTORY  2017    pain injection       Immunization History:   Immunization History   Administered Date(s) Administered    COVID-19, Chan Peter, PF, 30mcg/0.3mL 2021, 2021       Active Problems:  Patient Active Problem List   Diagnosis Code    Lumbar post-laminectomy syndrome M96.1    Sacroiliitis, not elsewhere classified ([facility] M46.1    Lumbosacral spondylosis without myelopathy M47.817    Shoulder joint dislocation S43.006A    Shoulder dislocation, right, initial encounter S43.004A    Hypokalemia E87.6    Hyponatremia E87.1    Acute hyponatremia E87.1       Isolation/Infection:   Isolation          No Isolation        Patient Infection Status     None to display          Nurse Assessment:  Last Vital Signs: BP (!) 152/74   Pulse 78   Temp 98.2 °F (36.8 °C) (Temporal)   Resp 16   Ht 5' 2\" (1.575 m)   Wt 141 lb 3.2 oz (64 kg)   SpO2 94%   BMI 25.83 kg/m²     Last documented pain score (0-10 scale): Pain Level: 4  Last Weight:   Wt Readings from Last 1 Encounters:   08/19/21 141 lb 3.2 oz (64 kg)     Mental Status:  oriented, alert, coherent, thought processes intact and able to concentrate and follow conversation    IV Access:  - None    Nursing Mobility/ADLs:  Walking   Independent  Transfer  Assisted  Bathing  Assisted  Dressing  Assisted  Toileting  Assisted  Feeding  Independent  Med Admin  Assisted  Med Delivery   whole    Wound Care Documentation and Therapy:  Incision 04/04/17 Back Right (Active)   Number of days: 4605        Elimination:  Continence:   · Bowel: Yes  · Bladder: Yes  Urinary Catheter: None   Colostomy/Ileostomy/Ileal Conduit: No       Date of Last BM: 08/15/2021    Intake/Output Summary (Last 24 hours) at 8/19/2021 0919  Last data filed at 8/19/2021 0736  Gross per 24 hour   Intake 3422.19 ml   Output 3000 ml   Net 422.19 ml     I/O last 3 completed shifts: In: 3533.6 [P.O.:1420; I.V.:2113.6]  Out: 3300 [Urine:3300]    Safety Concerns:     History of Falls (last 30 days) and At Risk for Falls    Impairments/Disabilities:      Right arm in sling     Nutrition Therapy:  Current Nutrition Therapy:   - Oral Diet:  General    Routes of Feeding: Oral  Liquids: Thin Liquids  Daily Fluid Restriction: no  Last Modified Barium Swallow with Video (Video Swallowing Test): not done    Treatments at the Time of Hospital Discharge:   Respiratory Treatments: ***  Oxygen Therapy:  is not on home oxygen therapy.   Ventilator: - No ventilator support    Rehab Therapies: Physical Therapy and Occupational Therapy  Weight Bearing Status/Restrictions: non weight bearing to right arm   Other Medical Equipment (for information only, NOT a DME order):  cane and bath bench  Other Treatments: ***    Patient's personal belongings (please select all that are sent with patient):  Glasses    RN SIGNATURE:  Mily Vasquez RN    CASE MANAGEMENT/SOCIAL WORK SECTION    Inpatient Status Date: oos    Readmission Risk Assessment Score:  Readmission Risk              Risk of Unplanned Readmission:  13           Discharging to Facility/ Agency   · Name: [facility]  · [address]  · Phone:[phone]  · Fax:[phone]    Dialysis Facility (if applicable)   · Name:  · Address:  · Dialysis Schedule:  · Phone:  · Fax:    / signature: Electronically signed by SANGITA Morin on 8/17/21 at 10:08 AM EDT    PHYSICIAN SECTION    Prognosis: Good    Condition at Discharge: Stable    Rehab Potential (if transferring to Rehab): Good    Recommended Labs or Other Treatments After Discharge: bmp 1 wk    Physician Certification: I certify the above information and transfer of Christa Elizabeth  is necessary for the continuing treatment of the diagnosis listed and that she requires [facility] for less 30 days.      Update Admission H&P: Changes in H&P as follows - has hyponatremia,improved at the time of discharge    PHYSICIAN SIGNATURE:  Electronically signed by Elio Gee MD on 8/19/21 at 7:12 AM EDT

## 2021-08-17 NOTE — PROGRESS NOTES
Pt states that headache has resolved since nausea has also been improved. BP slightly improved to 170/88. Will continue to monitor and administer prn medications as indicated. Pt denies any current needs. Call light remains within reach.

## 2021-08-17 NOTE — PLAN OF CARE
Problem: Pain:  Goal: Control of acute pain  Description: Control of acute pain  8/17/2021 0202 by Wojciech Hargrove RN  Outcome: Ongoing  Note: Pain assessments provided with interventions as appropriate. Problem: Falls - Risk of:  Goal: Will remain free from falls  Description: Will remain free from falls  8/17/2021 0202 by Wojciech Hargrove RN  Outcome: Ongoing  Note: Pt bed in lowest position with wheels locked. Call light within reach. Bed alarm in place. Room remains free of obstacles. Pt reminded to use call light as needed, verbalizes understanding. Will continue to monitor. Problem: Skin Integrity:  Goal: Absence of new skin breakdown  Description: Absence of new skin breakdown  8/17/2021 0202 by Wojciech Hargrove RN  Outcome: Ongoing  Note: Skin assessment done as charted.

## 2021-08-17 NOTE — PLAN OF CARE
Problem: Pain:  Goal: Pain level will decrease  Description: Pain level will decrease  Outcome: Ongoing  Note: Pain assessment completed routinely. Will continue to monitor and medicate as needed. Problem: Falls - Risk of:  Goal: Will remain free from falls  Description: Will remain free from falls  8/17/2021 0944 by Trena Ireland RN  Outcome: Ongoing  Note: Patient is alert and oriented and has demonstrated the use of the call light for assistance before getting up. Education has been provided to defer the use of the bed alarm and/or restraint free alarm as the patient has shown competency in calling for assistance and verbalizing the risk. Problem: Skin Integrity:  Goal: Will show no infection signs and symptoms  Description: Will show no infection signs and symptoms  Outcome: Ongoing  Note: Skin assessment performed at regular intervals. No redness or open areas noted. Pt able to turn self, assistance provided when needed. Will continue to monitor patient closely for breakdown.

## 2021-08-17 NOTE — PROGRESS NOTES
Physical Therapy  Facility/Department: [facility] MED SURG  Daily Treatment Note  NAME: Randy Ntetles  : 1945  MRN: [id number]    Date of Service: 2021    Discharge Recommendations:  Continue to assess pending progress, Subacute/Skilled Nursing Facility, Home with Home health PT        Assessment   Treatment Diagnosis: Impaired balance, s/p R shoulder dislocation  Prognosis: Good  PT Education: Goals;PT Role  Patient Education: Pt educted on above with appropriate understanding. REQUIRES PT FOLLOW UP: Yes  Activity Tolerance  Activity Tolerance: Patient limited by pain     Patient Diagnosis(es): The primary encounter diagnosis was Dislocation of right shoulder joint, initial encounter. A diagnosis of Closed fracture of right shoulder, initial encounter was also pertinent to this visit. has a past medical history of Arthritis, Bell's palsy, Hyperlipidemia, and Seasonal allergies. has a past surgical history that includes  section; Colonoscopy (); Appendectomy; other surgical history (Right, 2016); back surgery; other surgical history (Right, 2016); other surgical history (2017); Nerve Block Lumb Facet Level 1 Bilateral (Right, 2017); Humerus Closed Reduction (Right, 2021); and Humerus Closed Reduction (Right, 2021). Restrictions  Restrictions/Precautions  Restrictions/Precautions: General Precautions  Required Braces or Orthoses?: Yes  Required Braces or Orthoses  Right Upper Extremity Brace/Splint: Sling  Subjective   General  Chart Reviewed: Yes  Response To Previous Treatment: Patient with no complaints from previous session. Family / Caregiver Present: No  Referring Practitioner: Sukhdeep Arguello MD  Subjective  Subjective: Patient reports 10/10 shoulder pain. Nursing alerted and pain medication was given per nurse.           Orientation  Orientation  Overall Orientation Status: Within Functional Limits  Cognition      Objective Ambulation  Ambulation?: No  Ambulation 1  Comments: P declined d/t just recently returning to bed and shoulder pain 10/10 at this time. Exercises  Straight Leg Raise: x20  Quad Sets: x20  Heelslides: x20  Gluteal Sets: x20  Hip Abduction: x20  Ankle Pumps: x20  Comments: Pt completed above listed ex in supine position, HOB elevated. Pt with several rest breaks d/t fatigue. G-Code     OutComes Score    AM-PAC Score    Goals  Short term goals  Time Frame for Short term goals: 10 days  Short term goal 1: Patient will ambulate 80' with supervision, without LOB  Short term goal 2: Patient will perform bed mobility and transfers with MI  Short term goal 3: Patient will tolerate 20-30 minutes of therex/act to improve endurance for ADLs  Short term goal 4: Patient will improve dynamic standing balance to good to decrease fall risk. Patient Goals   Patient goals : Feel better    Plan    Plan  Times per week: 7  Times per day: Twice a day  Plan Comment: 1x/day on weekends  Safety Devices  Type of devices:  All fall risk precautions in place, Bed alarm in place, Call light within reach, Patient at risk for falls, Nurse notified, Left in bed     Therapy Time   Individual Concurrent Group Co-treatment   Time In 1405         Time Out 1430         Minutes 25         Timed Code Treatment Minutes: [address], PTA

## 2021-08-17 NOTE — PROGRESS NOTES
Follow up with the patient regarding discharge plans. Her docotr wants the patient to go to short term Rehab for a week. Patient chose the [facility] from the list.   Referral amade and paper work fax to SNF.     Conception SANGITA Wells

## 2021-08-17 NOTE — PROGRESS NOTES
Physical Therapy    Facility/Department: [facility] AT THE [facility] MED SURG  Initial Assessment    NAME: Crissy Amato  : 1945  MRN: [id number]    Date of Service: 2021    Discharge Recommendations:  Continue to assess pending progress, Subacute/Skilled Nursing Facility, Home with Home health PT   PT Equipment Recommendations  Other: She might benefit from a SC depending on progress    Assessment   Body structures, Functions, Activity limitations: Decreased functional mobility ; Decreased balance;Decreased ADL status; Decreased high-level IADLs;Decreased strength;Decreased posture; Increased pain  Assessment: The patient is a 64 y.o. female s/p fall and dislocation of R shoulder. She demonstrates LE weakness, forward flexed posture, and impaired dynamic standing balance. She had a couple of instances of staggering gait pattern with ambulation in room. She would benefit from skilled PT to address her deficits to improve functional mobility. Treatment Diagnosis: Impaired balance, s/p R shoulder dislocation  Prognosis: Good  Decision Making: Medium Complexity  REQUIRES PT FOLLOW UP: Yes  Activity Tolerance  Activity Tolerance: Patient Tolerated treatment well       Patient Diagnosis(es): The primary encounter diagnosis was Dislocation of right shoulder joint, initial encounter. A diagnosis of Closed fracture of right shoulder, initial encounter was also pertinent to this visit. has a past medical history of Arthritis, Bell's palsy, Hyperlipidemia, and Seasonal allergies. has a past surgical history that includes  section; Colonoscopy (); Appendectomy; other surgical history (Right, 2016); back surgery; other surgical history (Right, 2016); other surgical history (2017); Nerve Block Lumb Facet Level 1 Bilateral (Right, 2017);  Humerus Closed Reduction (Right, 2021); and Humerus Closed Reduction (Right, 8/16/2021). Restrictions  Restrictions/Precautions  Restrictions/Precautions: General Precautions  Required Braces or Orthoses?: Yes  Required Braces or Orthoses  Right Upper Extremity Brace/Splint: Sling  Vision/Hearing  Vision: Impaired  Vision Exceptions: Wears glasses at all times  Hearing: Within functional limits     Subjective  General  Chart Reviewed: Yes  Patient assessed for rehabilitation services?: Yes  Family / Caregiver Present: No  Referring Practitioner: Shital Barraza Rd, MD  Referral Date : 08/17/21  Diagnosis: Dislocation of R shoulder joint, S43.004A  Follows Commands: Within Functional Limits  Subjective  Subjective: Patient reports 10/10 shoulder pain. She reports she just received pain medication. Pain Screening  Patient Currently in Pain: Yes    Orientation  Orientation  Overall Orientation Status: Within Functional Limits  Social/Functional History  Social/Functional History  Lives With: Alone  Type of Home: Apartment  Home Layout: One level  Home Access: Elevator  Bathroom Shower/Tub: Tub/Shower unit  Bathroom Toilet: Standard  Bathroom Equipment: Grab bars in shower  Bathroom Accessibility: Accessible  Receives Help From: Family  ADL Assistance: Independent  Homemaking Assistance: Independent  Homemaking Responsibilities: Yes  Ambulation Assistance: Independent  Transfer Assistance: Independent  Active : Yes  Mode of Transportation: Car  Additional Comments: Patient was independent with all ADLs and IADLS prior to injury. She reports she lives at [facility] and takes care of the [facility].   Cognition   Cognition  Overall Cognitive Status: WFL    Objective    AROM RLE (degrees)  RLE AROM: WFL  AROM LLE (degrees)  LLE AROM : WFL  Strength RLE  Comment: Grossly 4/5  Strength LLE  Comment: Grossly 4/5     Sensation  Overall Sensation Status: WFL  Bed mobility  Comment: Patient in bedside chair upon PT arrival  Transfers  Sit to Stand: Contact guard assistance  Stand to sit: Contact guard assistance  Ambulation  Ambulation?: Yes  WB Status: FWB  Ambulation 1  Surface: level tile  Device: No Device  Assistance: Contact guard assistance  Quality of Gait: Patient ambulates with forward flexed posture. She had a couple instances of staggering gait pattern  Distance: 40'  Stairs/Curb  Stairs?: No     Balance  Posture: Poor  Sitting - Static: Good  Sitting - Dynamic: Good  Standing - Static: Fair  Standing - Dynamic: Fair;-        Plan   Plan  Times per week: 7  Times per day: Twice a day  Plan Comment: 1x/day on weekends  Safety Devices  Type of devices: All fall risk precautions in place    AM-PAC Score  AM-PAC Inpatient Mobility without Stair Climbing Raw Score : 15 (08/17/21 0744)  AM-PAC Inpatient without Stair Climbing T-Scale Score : 43.03 (08/17/21 0744)  Mobility Inpatient CMS 0-100% Score: 47.43 (08/17/21 0744)  Mobility Inpatient without Stair CMS G-Code Modifier : CK (08/17/21 0744)    Goals  Short term goals  Time Frame for Short term goals: 10 days  Short term goal 1: Patient will ambulate 100' with supervision, without LOB  Short term goal 2: Patient will perform bed mobility and transfers with MI  Short term goal 3: Patient will tolerate 20-30 minutes of therex/act to improve endurance for ADLs  Short term goal 4: Patient will improve dynamic standing balance to good to decrease fall risk.   Patient Goals   Patient goals : Feel better       Therapy Time   Individual Concurrent Group Co-treatment   Time In 5916         Time Out 0737         Minutes 19         Timed Code Treatment Minutes: Chantell Rocha DPT

## 2021-08-17 NOTE — PROGRESS NOTES
Previous supervisor Katie Thompson RN had contacted supervisor Zac Gilmore RN to ask about need for head CT due to emesis and hypertension and headache. While on phone with physician writer asked about this but no order received at this time. Will continue to monitor.

## 2021-08-17 NOTE — PROGRESS NOTES
Pt reassessed and VS rechecked at this time. Pt had complaints of heartburn and states Tums at home typically helps. Order received and Tums administered. /88, prn metoprolol administered per order. Pt also has complaints of right shoulder pain, prn morphine administered. Pt denies any other needs at this time. Call light remains within reach. Will monitor.

## 2021-08-17 NOTE — PROGRESS NOTES
Vasotec administered at this time. /89. Pt had episode of emesis as writer was in room, prn Zofran also administered. Pt has complaints of headache, rated \"5\" on a 0-10 pain scale. Pt denies any other needs. SCD's remain in place. Call light within reach. Will continue to monitor.

## 2021-08-17 NOTE — PROGRESS NOTES
Patient has a hard time getting good seal on IS mouthpiece d/t hx of bell's palsy. Patient instructed to DB&C instead.

## 2021-08-17 NOTE — PROGRESS NOTES
Physical Therapy  Facility/Department: [facility] AT THE [facility] MED SURG  Daily Treatment Note  NAME: Comfort Hong  : 1945  MRN: [id number]    Date of Service: 2021    Discharge Recommendations:  Continue to assess pending progress, Subacute/Skilled Nursing Facility, Home with Home health PT        Assessment   Treatment Diagnosis: Impaired balance, s/p R shoulder dislocation  Prognosis: Good  PT Education: Goals;PT Role  Patient Education: Pt educted on above with appropriate understanding. REQUIRES PT FOLLOW UP: Yes  Activity Tolerance  Activity Tolerance: Patient limited by pain     Patient Diagnosis(es): The primary encounter diagnosis was Dislocation of right shoulder joint, initial encounter. A diagnosis of Closed fracture of right shoulder, initial encounter was also pertinent to this visit. has a past medical history of Arthritis, Bell's palsy, Hyperlipidemia, and Seasonal allergies. has a past surgical history that includes  section; Colonoscopy (); Appendectomy; other surgical history (Right, 2016); back surgery; other surgical history (Right, 2016); other surgical history (2017); Nerve Block Lumb Facet Level 1 Bilateral (Right, 2017); Humerus Closed Reduction (Right, 2021); and Humerus Closed Reduction (Right, 2021). Restrictions  Restrictions/Precautions  Restrictions/Precautions: General Precautions  Required Braces or Orthoses?: Yes  Required Braces or Orthoses  Right Upper Extremity Brace/Splint: Sling  Subjective   General  Chart Reviewed: Yes  Response To Previous Treatment: Patient with no complaints from previous session. Family / Caregiver Present: No  Referring Practitioner: Gaye Snow MD  Subjective  Subjective: Patient reports 10/10 shoulder pain. Nursing alerted and pain medication was given per nurse.           Orientation  Orientation  Overall Orientation Status: Within Functional Limits  Cognition      Objective

## 2021-08-17 NOTE — PROGRESS NOTES
Occupational Therapy  Facility/Department: [facility] AT THE [facility] MED SURG  Daily Treatment Note  NAME: Aaron Damon  : 1945  MRN: [id number]    Date of Service: 2021    Discharge Recommendations:  Continue to assess pending progress, Subacute/Skilled Nursing Facility       Assessment      OT Education: OT Role;Plan of Care;Equipment; Family Education; ADL Adaptive Strategies  Patient Education: Educated on OT role and plan for tx. Reviewed d/c folder with pt and daughter. Barriers to Learning: none noted  Activity Tolerance  Activity Tolerance: Patient Tolerated treatment well  Safety Devices  Safety Devices in place: Yes  Type of devices: Call light within reach; Left in bed         Patient Diagnosis(es): The primary encounter diagnosis was Dislocation of right shoulder joint, initial encounter. A diagnosis of Closed fracture of right shoulder, initial encounter was also pertinent to this visit. has a past medical history of Arthritis, Bell's palsy, Hyperlipidemia, and Seasonal allergies. has a past surgical history that includes  section; Colonoscopy (); Appendectomy; other surgical history (Right, 2016); back surgery; other surgical history (Right, 2016); other surgical history (2017); Nerve Block Lumb Facet Level 1 Bilateral (Right, 2017); Humerus Closed Reduction (Right, 2021); and Humerus Closed Reduction (Right, 2021).     Restrictions  Restrictions/Precautions  Restrictions/Precautions: General Precautions  Required Braces or Orthoses?: Yes (bucket sling)  Required Braces or Orthoses  Right Upper Extremity Brace/Splint: Sling  Subjective   General  Chart Reviewed: Yes  Patient assessed for rehabilitation services?: Yes  Response to previous treatment: Patient with no complaints from previous session  Family / Caregiver Present: Yes (daughter)  Subjective  Subjective: Pt reports 8/10 pain in R shoulder, decreased with repositioning and support of pillow. General Comment  Comments: Pt supine in bed upon OT arrival and agreeable to OT tx. Orientation  Orientation  Overall Orientation Status: Within Functional Limits  Objective    ADL  Toileting: Supervision  Additional Comments: Pt toileted with sup. Pt able to complete LB clothing mgmt and hygiene with one handed tech. Balance  Sitting Balance: Independent  Standing Balance: Supervision  Functional Mobility  Functional - Mobility Device: No device  Activity: To/from bathroom  Assist Level: Supervision  Bed mobility  Rolling to Right: Supervision  Supine to Sit: Supervision  Sit to Supine: Supervision  Transfers  Stand Step Transfers: Supervision  Sit to stand: Supervision  Stand to sit: Supervision                                            Type of ROM/Therapeutic Exercise  Type of ROM/Therapeutic Exercise: AROM  Comment: Pt completed AROM of R elbow flex/ext, wrist flex/ext, and digit flex/ext to maintain ROM and decrease swelling in RUE. Plan   Plan  Times per week: 7x  Times per day: Daily  Current Treatment Recommendations: Strengthening, ROM, Balance Training, Functional Mobility Training, Endurance Training, Safety Education & Training, Patient/Caregiver Education & Training, Equipment Evaluation, Education, & procurement, Self-Care / ADL  G-Code     OutComes Score                                                  AM-PAC Score             Goals  Short term goals  Time Frame for Short term goals: 20 visits  Short term goal 1: Patient to be educated on d/c folder, AE/DME and safety to ensure safe return home. Short term goal 2: Patient to complete UB/LB dressing and bathing c use of AE or compensatory techniques c SBA improve independence during ADL. Short term goal 3: Patient to complete toileting, grooming and hygiene c SBA to improve independence during ADL.   Short term goal 4: Patient to engage in ROM R elbow, wrist and digits to maintain ROM and decrease swelling. Short term goal 5: Patient to engage in 15 minutes of ther ex/ther act to improve LUE strength and UB activity tolerance for ADL independence and tolerance.        Therapy Time   Individual Concurrent Group Co-treatment   Time In 4930 Silviano Palacios         Time Out 0320         Minutes Stef 60, OTR/L

## 2021-08-17 NOTE — PROGRESS NOTES
Floresita Nolasco and suffered a fracture dislocation of right shoulder  NV ok  Up in chair  Alert and oriented  Up with assistance    Plan  Will NH assistance  RTO next week

## 2021-08-17 NOTE — PROGRESS NOTES
Progress Note  Lori Shi MD    OBJECTIVE:    Patient seen for f/u of Shoulder dislocation, right, initial encounter. She has significant pain rt shoulder requiring iv morphine  bp remains elevated  Na 126     ROS:   Constitutional: negative  for fevers, and negative for chills. Respiratory: negative for shortness of breath, negative for cough, and negative for wheezing  Cardiovascular: negative for chest pain, and negative for palpitations  Gastrointestinal: negative for abdominal pain, negative for nausea,negative for vomiting, negative for diarrhea, and negative for constipation  Has significant  Rt shoulder pain   All other systems were reviewed with the patient and are negative unless otherwise stated in HPI    OBJECTIVE:  Vitals:   Temp: 98.2 °F (36.8 °C)  BP: (!) 165/76  Resp: 16  Pulse: 77  SpO2: 92 %    24HR INTAKE/OUTPUT:      Intake/Output Summary (Last 24 hours) at 8/17/2021 0753  Last data filed at 8/17/2021 0719  Gross per 24 hour   Intake 2599.16 ml   Output 2950 ml   Net -350.84 ml     -----------------------------------------------------------------  Exam:  GEN:    Awake, alert and oriented x3. EYES:  EOMI, pupils equal   NECK: Supple. No lymphadenopathy. No carotid bruit  CVS:    regular rate and rhythm, no audible murmur  PULM:  CTA, no wheezes, rales or rhonchi, no acute respiratory distress  ABD:    Bowels sounds normal.  Abdomen is soft. No distention. no tenderness to palpation. EXT: rt shoulder in sling,  no edema bilaterally . No calf tenderness. NEURO: Moves all extremities. Motor and sensory are grossly intact  SKIN:  No rashes.   No skin lesions.    -----------------------------------------------------------------  Diagnostic Data:    · All available data reviewed  Lab Results   Component Value Date    WBC 7.5 08/17/2021    HGB 11.5 (L) 08/17/2021    MCV 95.5 08/17/2021     08/17/2021      Lab Results   Component Value Date    GLUCOSE 124 (H) 08/17/2021    BUN 14 08/17/2021    CREATININE 0.60 08/17/2021     (L) 08/17/2021    K 4.4 08/17/2021    CALCIUM 9.6 08/17/2021    CL 89 (L) 08/17/2021    CO2 24 08/17/2021       PROBLEM LIST:  Principal Problem:    Shoulder dislocation, right, initial encounter  Active Problems:    Shoulder joint dislocation    Hypokalemia    Hyponatremia  Resolved Problems:    * No resolved hospital problems. *      ASSESSMENT / PLAN:  Shoulder dislocation, right, initial encounter  · Pain control,pt and ot  · HTN- increase norvasc to 10 mg,prn metoprolol  · Hyponatremia- d/c hctz from lisinipril /hctz and monitor bmp  · Nutrition status:  Well developed, well nourished with no malnutrition  · DVT prophylaxis: Lovenox   · High risk medications: none   · Disposition:  Discharge plan is [facility]    Monique Hendricks MD , M.D.  8/17/2021  7:53 AM

## 2021-08-17 NOTE — PROGRESS NOTES
Occupational Therapy   Occupational Therapy Initial Assessment  Date: 2021   Patient Name: Abida Meza  MRN: [id number]     : 1945    Date of Service: 2021    Discharge Recommendations:  Continue to assess pending progress, Subacute/Skilled Nursing Facility       Assessment   Performance deficits / Impairments: Decreased functional mobility ; Decreased ADL status; Decreased ROM; Decreased strength;Decreased safe awareness;Decreased endurance;Decreased balance;Decreased high-level IADLs  Assessment: Patient presents post fall c R shoulder fracture and dislocation. Patient RUE dominant, requires increased need for assist during all ADL. OT to address. Treatment Diagnosis: weakness  Prognosis: Good  Decision Making: Medium Complexity  OT Education: OT Role;Plan of Care;Transfer Training  REQUIRES OT FOLLOW UP: Yes  Safety Devices  Safety Devices in place: Yes  Type of devices: Call light within reach; Left in bed           Patient Diagnosis(es): The primary encounter diagnosis was Dislocation of right shoulder joint, initial encounter. A diagnosis of Closed fracture of right shoulder, initial encounter was also pertinent to this visit. has a past medical history of Arthritis, Bell's palsy, Hyperlipidemia, and Seasonal allergies. has a past surgical history that includes  section; Colonoscopy (); Appendectomy; other surgical history (Right, 2016); back surgery; other surgical history (Right, 2016); other surgical history (2017); Nerve Block Lumb Facet Level 1 Bilateral (Right, 2017); Humerus Closed Reduction (Right, 2021); and Humerus Closed Reduction (Right, 2021).     Treatment Diagnosis: weakness      Restrictions  Restrictions/Precautions  Restrictions/Precautions: General Precautions  Required Braces or Orthoses?: Yes  Required Braces or Orthoses  Right Upper Extremity Brace/Splint: Sling    Subjective   General  Patient assessed for rehabilitation services?: Yes  Subjective  Subjective: Patient reports that she is still having 9/10 pain and that nothing is helping. States that she is righ handed. Pain Assessment  Response to Pain Intervention: None  Vital Signs  BP: (!) 144/70  MAP (mmHg): 88  Social/Functional History  Social/Functional History  Lives With: Alone  Type of Home: Apartment  Home Layout: One level  Home Access: Elevator  Bathroom Shower/Tub: Tub/Shower unit  Bathroom Toilet: Standard  Bathroom Equipment: Grab bars in shower  Bathroom Accessibility: Accessible  Receives Help From: Family  ADL Assistance: Independent  Homemaking Assistance: Independent  Homemaking Responsibilities: Yes  Ambulation Assistance: Independent  Transfer Assistance: Independent  Active : Yes  Mode of Transportation: Car  Additional Comments: Patient was independent with all ADLs and IADLS prior to injury. She reports she lives at [facility] and takes care of the [facility]. Objective   Vision: Impaired  Vision Exceptions: Wears glasses at all times  Hearing: Within functional limits    Orientation  Overall Orientation Status: Within Functional Limits     Balance  Sitting Balance: Independent  Standing Balance: Contact guard assistance  Functional Mobility  Functional - Mobility Device: No device  Assist Level: Contact guard assistance  Toilet Transfers  Toilet Transfer: Contact guard assistance  ADL  Feeding: Setup;Minimal assistance  Grooming: Minimal assistance  UE Bathing: Moderate assistance  LE Bathing: Moderate assistance  UE Dressing: Moderate assistance  LE Dressing:  Moderate assistance  Toileting: Contact guard assistance           Transfers  Stand Step Transfers: Contact guard assistance  Sit to stand: Contact guard assistance  Stand to sit: Contact guard assistance     Cognition  Overall Cognitive Status: WFL        Sensation  Overall Sensation Status: WFL        LUE AROM (degrees)  LUE AROM : WFL  Left Hand AROM (degrees)  Left Hand AROM: WFL  RUE AROM (degrees)  RUE General AROM: DNT R shoulder; elbow, wrist and digits WFL  Right Hand AROM (degrees)  Right Hand AROM: WFL                      Plan   Plan  Times per week: 7x  Times per day: Daily  Current Treatment Recommendations: Strengthening, ROM, Balance Training, Functional Mobility Training, Endurance Training, Safety Education & Training, Patient/Caregiver Education & Training, Equipment Evaluation, Education, & procurement, Self-Care / ADL      AM-PAC Score        AM-PAC Inpatient Daily Activity Raw Score: 15 (08/17/21 0940)  AM-PAC Inpatient ADL T-Scale Score : 34.69 (08/17/21 0940)  ADL Inpatient CMS 0-100% Score: 56.46 (08/17/21 0940)  ADL Inpatient CMS G-Code Modifier : CK (08/17/21 0940)    Goals  Short term goals  Time Frame for Short term goals: 20 visits  Short term goal 1: Patient to be educated on d/c folder, AE/DME and safety to ensure safe return home. Short term goal 2: Patient to complete UB/LB dressing and bathing c use of AE or compensatory techniques c SBA improve independence during ADL. Short term goal 3: Patient to complete toileting, grooming and hygiene c SBA to improve independence during ADL. Short term goal 4: Patient to engage in ROM R elbow, wrist and digits to maintain ROM and decrease swelling. Short term goal 5: Patient to engage in 15 minutes of ther ex/ther act to improve LUE strength and UB activity tolerance for ADL independence and tolerance.        Therapy Time   Individual Concurrent Group Co-treatment   Time In 0825         Time Out 0840         Minutes 24 [facility], OTR/L

## 2021-08-18 PROBLEM — E87.1 ACUTE HYPONATREMIA: Status: ACTIVE | Noted: 2021-08-18

## 2021-08-18 LAB
ABSOLUTE EOS #: 0.09 K/UL (ref 0–0.44)
ABSOLUTE IMMATURE GRANULOCYTE: 0.04 K/UL (ref 0–0.3)
ABSOLUTE LYMPH #: 1.07 K/UL (ref 1.1–3.7)
ABSOLUTE MONO #: 1.06 K/UL (ref 0.1–1.2)
ANION GAP SERPL CALCULATED.3IONS-SCNC: 11 MMOL/L (ref 9–17)
BASOPHILS # BLD: 0 % (ref 0–2)
BASOPHILS ABSOLUTE: <0.03 K/UL (ref 0–0.2)
BUN BLDV-MCNC: 15 MG/DL (ref 8–23)
BUN/CREAT BLD: 21 (ref 9–20)
CALCIUM SERPL-MCNC: 9.1 MG/DL (ref 8.6–10.4)
CHLORIDE BLD-SCNC: 88 MMOL/L (ref 98–107)
CO2: 25 MMOL/L (ref 20–31)
CREAT SERPL-MCNC: 0.72 MG/DL (ref 0.5–0.9)
DIFFERENTIAL TYPE: ABNORMAL
EOSINOPHILS RELATIVE PERCENT: 2 % (ref 1–4)
GFR AFRICAN AMERICAN: >60 ML/MIN
GFR NON-AFRICAN AMERICAN: >60 ML/MIN
GFR SERPL CREATININE-BSD FRML MDRD: ABNORMAL ML/MIN/{1.73_M2}
GFR SERPL CREATININE-BSD FRML MDRD: ABNORMAL ML/MIN/{1.73_M2}
GLUCOSE BLD-MCNC: 99 MG/DL (ref 70–99)
HCT VFR BLD CALC: 31.5 % (ref 36.3–47.1)
HEMOGLOBIN: 10.5 G/DL (ref 11.9–15.1)
IMMATURE GRANULOCYTES: 1 %
LYMPHOCYTES # BLD: 19 % (ref 24–43)
MCH RBC QN AUTO: 31.9 PG (ref 25.2–33.5)
MCHC RBC AUTO-ENTMCNC: 33.3 G/DL (ref 28.4–34.8)
MCV RBC AUTO: 95.7 FL (ref 82.6–102.9)
MONOCYTES # BLD: 19 % (ref 3–12)
NRBC AUTOMATED: 0 PER 100 WBC
OSMOLALITY URINE: 396 MOSM/KG (ref 80–1300)
PDW BLD-RTO: 11.6 % (ref 11.8–14.4)
PLATELET # BLD: 200 K/UL (ref 138–453)
PLATELET ESTIMATE: ABNORMAL
PMV BLD AUTO: 8.8 FL (ref 8.1–13.5)
POTASSIUM SERPL-SCNC: 4.1 MMOL/L (ref 3.7–5.3)
RBC # BLD: 3.29 M/UL (ref 3.95–5.11)
RBC # BLD: ABNORMAL 10*6/UL
SEG NEUTROPHILS: 59 % (ref 36–65)
SEGMENTED NEUTROPHILS ABSOLUTE COUNT: 3.45 K/UL (ref 1.5–8.1)
SODIUM BLD-SCNC: 124 MMOL/L (ref 135–144)
SODIUM BLD-SCNC: 126 MMOL/L (ref 135–144)
SODIUM BLD-SCNC: 129 MMOL/L (ref 135–144)
SODIUM,UR: <20 MMOL/L
WBC # BLD: 5.7 K/UL (ref 3.5–11.3)
WBC # BLD: ABNORMAL 10*3/UL

## 2021-08-18 PROCEDURE — 36415 COLL VENOUS BLD VENIPUNCTURE: CPT

## 2021-08-18 PROCEDURE — 6370000000 HC RX 637 (ALT 250 FOR IP): Performed by: FAMILY MEDICINE

## 2021-08-18 PROCEDURE — 97110 THERAPEUTIC EXERCISES: CPT

## 2021-08-18 PROCEDURE — 99222 1ST HOSP IP/OBS MODERATE 55: CPT | Performed by: INTERNAL MEDICINE

## 2021-08-18 PROCEDURE — 1200000000 HC SEMI PRIVATE

## 2021-08-18 PROCEDURE — 84300 ASSAY OF URINE SODIUM: CPT

## 2021-08-18 PROCEDURE — 97116 GAIT TRAINING THERAPY: CPT

## 2021-08-18 PROCEDURE — 85025 COMPLETE CBC W/AUTO DIFF WBC: CPT

## 2021-08-18 PROCEDURE — 2580000003 HC RX 258: Performed by: FAMILY MEDICINE

## 2021-08-18 PROCEDURE — 96372 THER/PROPH/DIAG INJ SC/IM: CPT

## 2021-08-18 PROCEDURE — G0378 HOSPITAL OBSERVATION PER HR: HCPCS

## 2021-08-18 PROCEDURE — 80048 BASIC METABOLIC PNL TOTAL CA: CPT

## 2021-08-18 PROCEDURE — 6360000002 HC RX W HCPCS: Performed by: FAMILY MEDICINE

## 2021-08-18 PROCEDURE — 94761 N-INVAS EAR/PLS OXIMETRY MLT: CPT

## 2021-08-18 PROCEDURE — 84295 ASSAY OF SERUM SODIUM: CPT

## 2021-08-18 PROCEDURE — 83935 ASSAY OF URINE OSMOLALITY: CPT

## 2021-08-18 RX ORDER — SODIUM CHLORIDE 9 MG/ML
INJECTION, SOLUTION INTRAVENOUS CONTINUOUS
Status: DISCONTINUED | OUTPATIENT
Start: 2021-08-18 | End: 2021-08-19

## 2021-08-18 RX ORDER — PANTOPRAZOLE SODIUM 40 MG/1
40 TABLET, DELAYED RELEASE ORAL
Status: DISCONTINUED | OUTPATIENT
Start: 2021-08-18 | End: 2021-08-19 | Stop reason: HOSPADM

## 2021-08-18 RX ADMIN — AMLODIPINE BESYLATE 10 MG: 10 TABLET ORAL at 08:32

## 2021-08-18 RX ADMIN — DULOXETINE HYDROCHLORIDE 60 MG: 60 CAPSULE, DELAYED RELEASE ORAL at 08:32

## 2021-08-18 RX ADMIN — ENOXAPARIN SODIUM 40 MG: 40 INJECTION SUBCUTANEOUS at 08:32

## 2021-08-18 RX ADMIN — DOCUSATE SODIUM 100 MG: 100 CAPSULE, LIQUID FILLED ORAL at 08:32

## 2021-08-18 RX ADMIN — OXYCODONE HYDROCHLORIDE AND ACETAMINOPHEN 1 TABLET: 5; 325 TABLET ORAL at 21:22

## 2021-08-18 RX ADMIN — OXYCODONE HYDROCHLORIDE AND ACETAMINOPHEN 1 TABLET: 5; 325 TABLET ORAL at 09:19

## 2021-08-18 RX ADMIN — SODIUM CHLORIDE, PRESERVATIVE FREE 10 ML: 5 INJECTION INTRAVENOUS at 08:34

## 2021-08-18 RX ADMIN — OXYCODONE HYDROCHLORIDE AND ACETAMINOPHEN 1 TABLET: 5; 325 TABLET ORAL at 15:23

## 2021-08-18 RX ADMIN — LISINOPRIL 20 MG: 20 TABLET ORAL at 08:32

## 2021-08-18 RX ADMIN — OXYCODONE HYDROCHLORIDE AND ACETAMINOPHEN 1 TABLET: 5; 325 TABLET ORAL at 03:17

## 2021-08-18 RX ADMIN — PANTOPRAZOLE SODIUM 40 MG: 40 TABLET, DELAYED RELEASE ORAL at 09:19

## 2021-08-18 RX ADMIN — SODIUM CHLORIDE: 9 INJECTION, SOLUTION INTRAVENOUS at 09:19

## 2021-08-18 ASSESSMENT — PAIN DESCRIPTION - DESCRIPTORS
DESCRIPTORS: THROBBING
DESCRIPTORS: THROBBING
DESCRIPTORS: ACHING

## 2021-08-18 ASSESSMENT — PAIN - FUNCTIONAL ASSESSMENT
PAIN_FUNCTIONAL_ASSESSMENT: PREVENTS OR INTERFERES SOME ACTIVE ACTIVITIES AND ADLS

## 2021-08-18 ASSESSMENT — PAIN SCALES - GENERAL
PAINLEVEL_OUTOF10: 8
PAINLEVEL_OUTOF10: 7
PAINLEVEL_OUTOF10: 4
PAINLEVEL_OUTOF10: 5
PAINLEVEL_OUTOF10: 10
PAINLEVEL_OUTOF10: 8
PAINLEVEL_OUTOF10: 9
PAINLEVEL_OUTOF10: 8
PAINLEVEL_OUTOF10: 5
PAINLEVEL_OUTOF10: 0
PAINLEVEL_OUTOF10: 5

## 2021-08-18 ASSESSMENT — PAIN DESCRIPTION - ORIENTATION
ORIENTATION: RIGHT

## 2021-08-18 ASSESSMENT — PAIN DESCRIPTION - FREQUENCY
FREQUENCY: CONTINUOUS

## 2021-08-18 ASSESSMENT — PAIN DESCRIPTION - PROGRESSION
CLINICAL_PROGRESSION: NOT CHANGED

## 2021-08-18 ASSESSMENT — PAIN DESCRIPTION - LOCATION
LOCATION: SHOULDER

## 2021-08-18 ASSESSMENT — PAIN DESCRIPTION - PAIN TYPE
TYPE: ACUTE PAIN

## 2021-08-18 ASSESSMENT — PAIN DESCRIPTION - ONSET
ONSET: ON-GOING

## 2021-08-18 NOTE — PROGRESS NOTES
Progress Note  Lori Shi MD    OBJECTIVE:    Patient seen for f/u of Shoulder dislocation, right, initial encounter. She has significant pain rt shoulder requiring pain meds  bp better  Na 124- worse      ROS:   Constitutional: negative  for fevers, and negative for chills. Respiratory: negative for shortness of breath, negative for cough, and negative for wheezing  Cardiovascular: negative for chest pain, and negative for palpitations  Gastrointestinal: negative for abdominal pain, negative for nausea,negative for vomiting, negative for diarrhea, and negative for constipation  Has significant  Rt shoulder pain   All other systems were reviewed with the patient and are negative unless otherwise stated in HPI    OBJECTIVE:  Vitals:   Temp: 97.6 °F (36.4 °C)  BP: 119/71  Resp: 18  Pulse: 70  SpO2: 95 %    24HR INTAKE/OUTPUT:      Intake/Output Summary (Last 24 hours) at 8/18/2021 0826  Last data filed at 8/18/2021 0701  Gross per 24 hour   Intake --   Output 1700 ml   Net -1700 ml     -----------------------------------------------------------------  Exam:  GEN:    Awake, alert and oriented x3. EYES:  EOMI, pupils equal   NECK: Supple. No lymphadenopathy. No carotid bruit  CVS:    regular rate and rhythm, no audible murmur  PULM:  CTA, no wheezes, rales or rhonchi, no acute respiratory distress  ABD:    Bowels sounds normal.  Abdomen is soft. No distention. no tenderness to palpation. EXT: rt shoulder in sling,  no edema bilaterally . No calf tenderness. NEURO: Moves all extremities. Motor and sensory are grossly intact  SKIN:  No rashes.   No skin lesions.    -----------------------------------------------------------------  Diagnostic Data:    · All available data reviewed  Lab Results   Component Value Date    WBC 5.7 08/18/2021    HGB 10.5 (L) 08/18/2021    MCV 95.7 08/18/2021     08/18/2021      Lab Results   Component Value Date    GLUCOSE 99 08/18/2021    BUN 15 08/18/2021

## 2021-08-18 NOTE — PROGRESS NOTES
Occupational Therapy  Facility/Department: [facility] AT THE [facility] MED SURG  Daily Treatment Note  NAME: Teressa Lucas  : 1945  MRN: [id number]    Date of Service: 2021    Discharge Recommendations:  Continue to assess pending progress, Subacute/Skilled Nursing Facility       Assessment      Activity Tolerance  Activity Tolerance: Patient Tolerated treatment well  Safety Devices  Safety Devices in place: Yes  Type of devices: Call light within reach; Left in chair         Patient Diagnosis(es): The primary encounter diagnosis was Dislocation of right shoulder joint, initial encounter. A diagnosis of Closed fracture of right shoulder, initial encounter was also pertinent to this visit. has a past medical history of Arthritis, Bell's palsy, Hyperlipidemia, and Seasonal allergies. has a past surgical history that includes  section; Colonoscopy (); Appendectomy; other surgical history (Right, 2016); back surgery; other surgical history (Right, 2016); other surgical history (2017); Nerve Block Lumb Facet Level 1 Bilateral (Right, 2017); Humerus Closed Reduction (Right, 2021); and Humerus Closed Reduction (Right, 2021). Restrictions  Restrictions/Precautions  Restrictions/Precautions: General Precautions  Required Braces or Orthoses?: Yes  Required Braces or Orthoses  Right Upper Extremity Brace/Splint: Sling  Subjective   General  Chart Reviewed: Yes  Patient assessed for rehabilitation services?: Yes  Response to previous treatment: Patient with no complaints from previous session  Family / Caregiver Present: No  Subjective  Subjective: Pt reportes pain in RUE with movement only. General Comment  Comments: Pt sitting up in bedside chair upon arrival. Pt agreed to participate in therapy session.       Orientation     Objective                                                          Additional Activities Comment  Additional Activities: Pt educated on d/c folder, AE/DME, and safety with G understanding. Type of ROM/Therapeutic Exercise  Type of ROM/Therapeutic Exercise: AROM  Comment: Pt tolerated AROM LUE  with 1# dumbbell x 7 planes x 20 reps x 1 set, R elbow flex/ext, wrist flex/ext, pronation supination, digit flex/ext x 10 reps x 1 set to increase UE strength and ROM in order to increase Ind with ADL tasks. Plan   Plan  Times per week: 7x  Times per day: Daily  Current Treatment Recommendations: Strengthening, ROM, Balance Training, Functional Mobility Training, Endurance Training, Safety Education & Training, Patient/Caregiver Education & Training, Equipment Evaluation, Education, & procurement, Self-Care / ADL  G-Code     OutComes Score                                                  AM-PAC Score             Goals  Short term goals  Time Frame for Short term goals: 20 visits  Short term goal 1: Patient to be educated on d/c folder, AE/DME and safety to ensure safe return home. Short term goal 2: Patient to complete UB/LB dressing and bathing c use of AE or compensatory techniques c SBA improve independence during ADL. Short term goal 3: Patient to complete toileting, grooming and hygiene c SBA to improve independence during ADL. Short term goal 4: Patient to engage in ROM R elbow, wrist and digits to maintain ROM and decrease swelling. Short term goal 5: Patient to engage in 15 minutes of ther ex/ther act to improve LUE strength and UB activity tolerance for ADL independence and tolerance.        Therapy Time   Individual Concurrent Group Co-treatment   Time In 1020         Time Out 1044         Minutes 24                 TATO Lacy/TED

## 2021-08-18 NOTE — CONSULTS
[facility]    [address]  [facility]  8/18/2021 1:38 PM       TELEHEALTH EVALUATION -- Audio/Visual (During XMGVH-87 public health emergency)     Telehealth service was provided with the patient at her room in [place] [address]. and myself the physician in my office in [address] [place] [address] and the patient's RN, Cas Givnes, who has initiated the visit. Pursuant to the emergency declaration under the [address] [place] [address] waiver authority and the [employer] Act, this Virtual  Visit was conducted, with patient's consent, to reduce the patient's risk of exposure to COVID-19 and provide continuity of care for an established patient. Services were provided through a video synchronous discussion virtually to substitute for in-person clinic visit. Pt Name:    Christa Elizabeth  MRN:     [id number]   [id number]  YOB: 1945  Admit Date:    8/15/2021 10:33 PM  Primary Care Physician:  Luis Miguel Cash MD        Reason for Consult:  Hyponatremia    History:   The patient is a 64 y.o. female with hx of HTN, hyperlipidemia,arthritis presented on 8/16 after a fall. She injured her right shoulder and was found to have a fracture dislocation. She subsequently went to the OR with ortho and had closed reduction. Nephrology was consulted today for hyponatremia. Sodium on admission was 129 and today is 124. She denies any hx of hyponatremia in the past.  She was on combination antihypertensive with thiazide which was recently started a few months ago. This was stopped yesterday. She is also on Cymbalta chronically. She has had poor po intake while in the hospital and yesterday had some vomiting which she attributes to GERD symptoms. Urine sodium was < 20, urine osmol is pending. She was started on normal saline this morning.    She is having a lot of pain from her shoulder. Past Medical History:  Past Medical History:   Diagnosis Date    Arthritis     Bell's palsy     Hyperlipidemia     Seasonal allergies        Past Surgical History:  Past Surgical History:   Procedure Laterality Date    APPENDECTOMY      BACK SURGERY       SECTION      COLONOSCOPY      HUMERUS CLOSED REDUCTION Right 2021    HUMERUS CLOSED REDUCTION Right 2021    HUMERUS CLOSED REDUCTION performed by Gretchen Perez MD at [address] [place] [address] FACET LEVEL 1 BILATERAL Right 2017    LUMBAR FACET L-4-5,L5-S1 performed by Lenka Thomas MD at [place] [address] Right 2016    SI pain injection- Dr. Manuel Mendez Right 2016    S1 RFA    OTHER SURGICAL HISTORY  2017    pain injection       Family History:  History reviewed. No pertinent family history. Social History:  Social History     Socioeconomic History    Marital status:      Spouse name: Not on file    Number of children: Not on file    Years of education: Not on file    Highest education level: Not on file   Occupational History    Not on file   Tobacco Use    Smoking status: Never Smoker    Smokeless tobacco: Never Used   Substance and Sexual Activity    Alcohol use: Yes     Alcohol/week: 10.0 standard drinks     Types: 10 Cans of beer per week    Drug use: No    Sexual activity: Not on file   Other Topics Concern    Not on file   Social History Narrative    Not on file     Social Determinants of Health     Financial Resource Strain:     Difficulty of Paying Living Expenses:    Food Insecurity:     Worried About Running Out of Food in the Last Year:     [address] in the Last Year:    Transportation Needs:     Lack of Transportation (Medical):      Lack of Transportation (Non-Medical):    Physical Activity:     Days of Exercise per Week:     Minutes of Exercise per Session:    Stress:     Feeling of Stress :    Social Connections:     Frequency of Communication with Friends and Family:     Frequency of Social Gatherings with Friends and Family:     Attends [employer] Services:     Active Member of Clubs or Organizations:     Attends Club or Organization Meetings:     Marital Status:    Intimate Partner Violence:     Fear of Current or Ex-Partner:     Emotionally Abused:     Physically Abused:     Sexually Abused:        Home Meds:  Prior to Admission medications    Medication Sig Start Date End Date Taking?  Authorizing Provider   enoxaparin (LOVENOX) 40 MG/0.4ML injection Inject 0.4 mLs into the skin daily for 10 days 8/18/21 8/28/21 Yes Gaby Canada MD   DULoxetine (CYMBALTA) 60 MG extended release capsule Take 60 mg by mouth daily   Yes Historical Provider, MD   lisinopril-hydroCHLOROthiazide (PRINZIDE;ZESTORETIC) 20-25 MG per tablet Take 1 tablet by mouth daily   Yes Historical Provider, MD   simvastatin (ZOCOR) 20 MG tablet Take 20 mg by mouth nightly   Yes Historical Provider, MD   Aspirin-Acetaminophen-Caffeine (EXCEDRIN PO) Take 2 tablets by mouth daily   Yes Historical Provider, MD   vitamin E 1000 UNITS capsule Take 1,000 Units by mouth daily   Yes Historical Provider, MD   Multiple Vitamins-Minerals (MULTIVITAMIN & MINERAL PO) Take by mouth   Yes Historical Provider, MD   Loratadine 10 MG CAPS Take by mouth daily   Yes Historical Provider, MD   aspirin 81 MG tablet Take 81 mg by mouth daily   Yes Historical Provider, MD   Coenzyme Q10 (CO Q 10) 100 MG CAPS Take 2 tablets by mouth daily    Historical Provider, MD   zolpidem (AMBIEN) 5 MG tablet Take 5 mg by mouth nightly as needed for Sleep    Historical Provider, MD       Review of Systems:  Constitutional: no fever or chills  Head: No headaches  Eyes: no blurry vision, no discharge  Ears: no ear pain or hearing changes  Nose: no runny nose or epistaxis  Respiratory: no shortness of breath or cough or sputum production  Cardiovascular: no chest pain, no alert   Pschy - not agitated, mood and memory normal    Due to this being a TeleHealth encounter, evaluation of the following organ systems is limited: Vitals/EENT/Resp/CV/GI//MS/Neuro/Skin/Heme-Lymph-Imm. Lab Data  CBC:   Recent Labs     08/15/21  2324 08/17/21  0600 08/18/21  0555   WBC 6.5 7.5 5.7   HGB 12.2 11.5* 10.5*   HCT 36.2* 34.1* 31.5*    220 200     BMP:  Recent Labs     08/15/21  2324 08/17/21  0600 08/18/21  0555   * 126* 124*   K 3.5* 4.4 4.1   CL 91* 89* 88*   CO2 20 24 25   BUN 16 14 15   CREATININE 0.64 0.60 0.72   GLUCOSE 112* 124* 99   CALCIUM 9.2 9.6 9.1     PTH: @PTH@  TSH: No results for input(s): TSH in the last 72 hours. HgBa1c: No results for input(s): LABA1C in the last 72 hours. Hepatic:   Recent Labs     08/15/21  2324   LABALBU 4.6   AST 32*   ALT 17   BILITOT 0.26*   ALKPHOS 73     ABGs: No results found for: PHART, PO2ART, TSB4YHB  Troponin: No results for input(s): TROPONINI in the last 72 hours. BNP: No results for input(s): BNP in the last 72 hours. Old labs reviewed. Impression and Plan:  1. Hyponatremia: multifactorial from thiazide diuretic compounded by poor solute intake. Agree with starting normal saline. Agree with stopping thiazide diuretic. Will repeat sodium level at 2 PM,advised RN to notify me of results. Urine osmol is pending. Urine Na < 20 suggesting prerenal.   She is on cymbalta which can cause SIADH but has other reasons to have hyponatremia and I doubt SIADH at this point. Will monitor response to normal saline  2. Hypokalemia from thiazide: resolved  3. HTN; improved. Amlodipine added. Maintain off thiazide due to hyponatremia  4. Right shoulder fracture dislocation s/p closed reduction    Thank you for allowing me to participate in the care of this patient. Please feel free to call me if you have any questions.      Electronically signed by Jett Madrigal DO on 8/18/21 at 1:38 PM EDT    [facility]

## 2021-08-18 NOTE — PROGRESS NOTES
Physical Therapy  Facility/Department: [facility] AT THE [facility] MED SURG  Daily Treatment Note  NAME: Teressa Lucas  : 1945  MRN: [id number]    Date of Service: 2021    Discharge Recommendations:  Continue to assess pending progress, Subacute/Skilled Nursing Facility, Home with Home health PT        Assessment   Treatment Diagnosis: Impaired balance, s/p R shoulder dislocation  Prognosis: Good  PT Education: Goals; General Safety;Gait Training;PT Role  Patient Education: Pt educted on above with appropriate understanding. REQUIRES PT FOLLOW UP: Yes  Activity Tolerance  Activity Tolerance: Patient Tolerated treatment well     Patient Diagnosis(es): The primary encounter diagnosis was Dislocation of right shoulder joint, initial encounter. A diagnosis of Closed fracture of right shoulder, initial encounter was also pertinent to this visit. has a past medical history of Arthritis, Bell's palsy, Hyperlipidemia, and Seasonal allergies. has a past surgical history that includes  section; Colonoscopy (); Appendectomy; other surgical history (Right, 2016); back surgery; other surgical history (Right, 2016); other surgical history (2017); Nerve Block Lumb Facet Level 1 Bilateral (Right, 2017); Humerus Closed Reduction (Right, 2021); and Humerus Closed Reduction (Right, 2021). Restrictions  Restrictions/Precautions  Restrictions/Precautions: General Precautions  Required Braces or Orthoses?: Yes  Required Braces or Orthoses  Right Upper Extremity Brace/Splint: Sling  Subjective   General  Chart Reviewed: Yes  Response To Previous Treatment: Patient with no complaints from previous session. Family / Caregiver Present: Yes (Daughter present)  Referring Practitioner: Evangelist Barakat MD  Subjective  Subjective: Pt denies shoulder pain at rest, only with movement.           Orientation  Orientation  Overall Orientation Status: Within Functional Limits  Cognition      Objective Bed mobility  Sit to Supine: Supervision  Scooting: Supervision  Comment: Pt up in recliner upon arrival.  Transfers  Sit to Stand: Contact guard assistance  Stand to sit: Contact guard assistance  Ambulation  Ambulation?: Yes  Ambulation 1  Surface: level tile  Device: Single point cane  Assistance: Contact guard assistance  Quality of Gait: reciprocal gait pattern with forward flexed posture. Quick pace with noted path deviation at times and NBOS. Distance: 75'x2  Comments: Pt reports that the [facility] makes her feel steadier. Stairs/Curb  Stairs?: No    G-Code     OutComes Score    AM-PAC Score    Goals  Short term goals  Time Frame for Short term goals: 10 days  Short term goal 1: Patient will ambulate 80' with supervision, without LOB  Short term goal 2: Patient will perform bed mobility and transfers with MI  Short term goal 3: Patient will tolerate 20-30 minutes of therex/act to improve endurance for ADLs  Short term goal 4: Patient will improve dynamic standing balance to good to decrease fall risk. Patient Goals   Patient goals : Feel better    Plan    Plan  Times per week: 7  Times per day: Twice a day  Plan Comment: 1x/day on weekends  Safety Devices  Type of devices:  All fall risk precautions in place, Bed alarm in place, Call light within reach, Gait belt, Patient at risk for falls, Nurse notified     Therapy Time   Individual Concurrent Group Co-treatment   Time In 1300         Time Out 1324         Minutes 24                 Odette Spangler PTA

## 2021-08-18 NOTE — PLAN OF CARE
Problem: Pain:  Goal: Pain level will decrease  Description: Pain level will decrease  8/17/2021 2354 by Jose Kilgore RN  Outcome: Ongoing  Note: Pain assessed routinely and as needed with a 0-10 scale. PRN pain medication given as appropriate per orders. Pain reassessed within an hour, will continue to monitor    8/17/2021 2315 by Artemio Kurtz RN  Outcome: Ongoing  Note: Pt complains of pain to shoulder/arm, prn medications given. Pain improves with medication, will continue to monitor  Goal: Control of acute pain  Description: Control of acute pain  8/17/2021 2354 by Jose Kilgore RN  Outcome: Ongoing  8/17/2021 2315 by Artemio Kurtz RN  Outcome: Ongoing  Goal: Control of chronic pain  Description: Control of chronic pain  8/17/2021 2354 by Jose Kilgore RN  Outcome: Ongoing  8/17/2021 2315 by Artemio Kurtz RN  Outcome: Ongoing     Problem: Falls - Risk of:  Goal: Will remain free from falls  Description: Will remain free from falls  8/17/2021 2354 by Jose Kilgore RN  Outcome: Ongoing  Note: Fall risk assessment done and patient is a high risk for falls. Alarms on as needed for patient safety. Patient being monitored on a regular basis. No falls noted at this time. 8/17/2021 2315 by Artemio Kurtz RN  Outcome: Ongoing  Note: Gripper socks/slippers on, call light within reach, bed alarm set and no falls have been noted  Goal: Absence of physical injury  Description: Absence of physical injury  8/17/2021 2354 by Jose Kilgore RN  Outcome: Ongoing  8/17/2021 2315 by Artemio Kurtz RN  Outcome: Ongoing     Problem: Skin Integrity:  Goal: Will show no infection signs and symptoms  Description: Will show no infection signs and symptoms  8/17/2021 2354 by Jose Kilgore RN  Outcome: Ongoing  Note: Patient is able to turn self as needed. No new open areas or redness noted.  Will continue to assess     8/17/2021 2315 by Artemio Kurtz RN  Outcome: Ongoing  Note: No signs if skin integrety  Goal: Absence of new skin breakdown  Description: Absence of new skin breakdown  8/17/2021 2354 by Parish Mitchell RN  Outcome: Ongoing  8/17/2021 2315 by Kyara Jackson RN  Outcome: Ongoing     Problem: Musculor/Skeletal Functional Status  Goal: Highest potential functional level  8/17/2021 2354 by Parish Mitchell RN  Outcome: Ongoing  8/17/2021 2315 by Kyara Jackson RN  Outcome: Ongoing  Goal: Absence of falls  8/17/2021 2354 by Parish Mitchell RN  Outcome: Ongoing  8/17/2021 2315 by Kyara Jackson RN  Outcome: Ongoing  Note: No falls have occurred

## 2021-08-18 NOTE — PROGRESS NOTES
Physical Therapy  Facility/Department: [facility] AT THE [facility] MED SURG  Daily Treatment Note  NAME: Teressa Lucas  : 1945  MRN: [id number]    Date of Service: 2021    Discharge Recommendations:  Continue to assess pending progress, Subacute/Skilled Nursing Facility, Home with Home health PT        Assessment   Treatment Diagnosis: Impaired balance, s/p R shoulder dislocation  Prognosis: Good  PT Education: Goals; General Safety;Gait Training;PT Role  Patient Education: Pt educted on above with appropriate understanding. REQUIRES PT FOLLOW UP: Yes  Activity Tolerance  Activity Tolerance: Patient Tolerated treatment well     Patient Diagnosis(es): The primary encounter diagnosis was Dislocation of right shoulder joint, initial encounter. A diagnosis of Closed fracture of right shoulder, initial encounter was also pertinent to this visit. has a past medical history of Arthritis, Bell's palsy, Hyperlipidemia, and Seasonal allergies. has a past surgical history that includes  section; Colonoscopy (); Appendectomy; other surgical history (Right, 2016); back surgery; other surgical history (Right, 2016); other surgical history (2017); Nerve Block Lumb Facet Level 1 Bilateral (Right, 2017); Humerus Closed Reduction (Right, 2021); and Humerus Closed Reduction (Right, 2021). Restrictions  Restrictions/Precautions  Restrictions/Precautions: General Precautions  Required Braces or Orthoses?: Yes  Required Braces or Orthoses  Right Upper Extremity Brace/Splint: Sling  Subjective   General  Chart Reviewed: Yes  Response To Previous Treatment: Patient with no complaints from previous session. Family / Caregiver Present: No  Referring Practitioner: Evangelist Barakat MD  Subjective  Subjective: Pt denies shoulder pain at rest, only with movement.           Orientation  Orientation  Overall Orientation Status: Within Functional Limits  Cognition      Objective   Bed mobility  Comment: Pt up in recliner upon arrival.  Transfers  Sit to Stand: Contact guard assistance  Stand to sit: Contact guard assistance  Ambulation  Ambulation?: Yes  Ambulation 1  Surface: level tile  Device: No Device  Assistance: Contact guard assistance  Quality of Gait: Patient ambulates with forward flexed posture. She had a couple instances of staggering gait pattern  Distance: 75'x2  Comments: Pt slightly unsteady, reports mild dizziness at times. Patient open to trying [address] Exercises  Hip Flexion: x20  Hip Abduction: x20  Knee Long Arc Quad: x20  Ankle Pumps: x20  Comments: Pt completed above listed ex in seated position. G-Code     OutComes Score    AM-PAC Score    Goals  Short term goals  Time Frame for Short term goals: 10 days  Short term goal 1: Patient will ambulate [facility]  1560' with supervision, without LOB  Short term goal 2: Patient will perform bed mobility and transfers with MI  Short term goal 3: Patient will tolerate 20-30 minutes of therex/act to improve endurance for ADLs  Short term goal 4: Patient will improve dynamic standing balance to good to decrease fall risk. Patient Goals   Patient goals : Feel better    Plan    Plan  Times per week: 7  Times per day: Twice a day  Plan Comment: 1x/day on weekends  Safety Devices  Type of devices:  All fall risk precautions in place, Call light within reach, Gait belt, Patient at risk for falls, Left in chair, Nurse notified     Therapy Time   Individual Concurrent Group Co-treatment   Time In 0842         Time Out 0905         Minutes [address] [place], [place]

## 2021-08-18 NOTE — PLAN OF CARE
Problem: Pain:  Goal: Control of acute pain  Description: Control of acute pain  8/18/2021 1155 by Nina Pineda RN  Outcome: Ongoing   Pt. Pain is adequately controlled with cold therapy and prn pain medication, will continue to monitor. Problem: Falls - Risk of:  Goal: Will remain free from falls  Description: Will remain free from falls  8/18/2021 1155 by Nina Pineda RN  Outcome: Ongoing   Pt. Remains free of falls, appropriate fall precautions in place. Problem: Skin Integrity:  Goal: Will show no infection signs and symptoms  Description: Will show no infection signs and symptoms  8/18/2021 1155 by Nina Pineda RN  Outcome: Ongoing     Problem: Skin Integrity:  Goal: Absence of new skin breakdown  Description: Absence of new skin breakdown  8/18/2021 1155 by Nina Pineda RN  Outcome: Ongoing   Pt. Remains free of any new skin breakdown, skin assessment completed.      Problem: Musculor/Skeletal Functional Status  Goal: Absence of falls  8/18/2021 1155 by Nina Pineda RN  Outcome: Ongoing

## 2021-08-19 VITALS
OXYGEN SATURATION: 97 % | DIASTOLIC BLOOD PRESSURE: 66 MMHG | HEART RATE: 90 BPM | HEIGHT: 62 IN | BODY MASS INDEX: 25.98 KG/M2 | TEMPERATURE: 99 F | SYSTOLIC BLOOD PRESSURE: 116 MMHG | WEIGHT: 141.2 LBS | RESPIRATION RATE: 18 BRPM

## 2021-08-19 LAB
ABSOLUTE EOS #: 0.11 K/UL (ref 0–0.44)
ABSOLUTE IMMATURE GRANULOCYTE: <0.03 K/UL (ref 0–0.3)
ABSOLUTE LYMPH #: 0.83 K/UL (ref 1.1–3.7)
ABSOLUTE MONO #: 0.83 K/UL (ref 0.1–1.2)
ANION GAP SERPL CALCULATED.3IONS-SCNC: 10 MMOL/L (ref 9–17)
BASOPHILS # BLD: 1 % (ref 0–2)
BASOPHILS ABSOLUTE: 0.03 K/UL (ref 0–0.2)
BUN BLDV-MCNC: 11 MG/DL (ref 8–23)
BUN/CREAT BLD: 19 (ref 9–20)
CALCIUM SERPL-MCNC: 8.8 MG/DL (ref 8.6–10.4)
CHLORIDE BLD-SCNC: 100 MMOL/L (ref 98–107)
CO2: 24 MMOL/L (ref 20–31)
CREAT SERPL-MCNC: 0.57 MG/DL (ref 0.5–0.9)
DIFFERENTIAL TYPE: ABNORMAL
EOSINOPHILS RELATIVE PERCENT: 3 % (ref 1–4)
GFR AFRICAN AMERICAN: >60 ML/MIN
GFR NON-AFRICAN AMERICAN: >60 ML/MIN
GFR SERPL CREATININE-BSD FRML MDRD: ABNORMAL ML/MIN/{1.73_M2}
GFR SERPL CREATININE-BSD FRML MDRD: ABNORMAL ML/MIN/{1.73_M2}
GLUCOSE BLD-MCNC: 96 MG/DL (ref 70–99)
HCT VFR BLD CALC: 31.6 % (ref 36.3–47.1)
HEMOGLOBIN: 10.6 G/DL (ref 11.9–15.1)
IMMATURE GRANULOCYTES: 1 %
LYMPHOCYTES # BLD: 20 % (ref 24–43)
MCH RBC QN AUTO: 32.6 PG (ref 25.2–33.5)
MCHC RBC AUTO-ENTMCNC: 33.5 G/DL (ref 28.4–34.8)
MCV RBC AUTO: 97.2 FL (ref 82.6–102.9)
MONOCYTES # BLD: 20 % (ref 3–12)
NRBC AUTOMATED: 0 PER 100 WBC
PDW BLD-RTO: 11.7 % (ref 11.8–14.4)
PLATELET # BLD: 185 K/UL (ref 138–453)
PLATELET ESTIMATE: ABNORMAL
PMV BLD AUTO: 8.6 FL (ref 8.1–13.5)
POTASSIUM SERPL-SCNC: 4.3 MMOL/L (ref 3.7–5.3)
RBC # BLD: 3.25 M/UL (ref 3.95–5.11)
RBC # BLD: ABNORMAL 10*6/UL
SEG NEUTROPHILS: 55 % (ref 36–65)
SEGMENTED NEUTROPHILS ABSOLUTE COUNT: 2.28 K/UL (ref 1.5–8.1)
SODIUM BLD-SCNC: 134 MMOL/L (ref 135–144)
TSH SERPL DL<=0.05 MIU/L-ACNC: 1.46 MIU/L (ref 0.3–5)
WBC # BLD: 4.1 K/UL (ref 3.5–11.3)
WBC # BLD: ABNORMAL 10*3/UL

## 2021-08-19 PROCEDURE — 36415 COLL VENOUS BLD VENIPUNCTURE: CPT

## 2021-08-19 PROCEDURE — 97116 GAIT TRAINING THERAPY: CPT

## 2021-08-19 PROCEDURE — 94761 N-INVAS EAR/PLS OXIMETRY MLT: CPT

## 2021-08-19 PROCEDURE — 84443 ASSAY THYROID STIM HORMONE: CPT

## 2021-08-19 PROCEDURE — 6360000002 HC RX W HCPCS: Performed by: FAMILY MEDICINE

## 2021-08-19 PROCEDURE — 85025 COMPLETE CBC W/AUTO DIFF WBC: CPT

## 2021-08-19 PROCEDURE — 2580000003 HC RX 258: Performed by: FAMILY MEDICINE

## 2021-08-19 PROCEDURE — 80048 BASIC METABOLIC PNL TOTAL CA: CPT

## 2021-08-19 PROCEDURE — 6370000000 HC RX 637 (ALT 250 FOR IP): Performed by: FAMILY MEDICINE

## 2021-08-19 PROCEDURE — 97110 THERAPEUTIC EXERCISES: CPT

## 2021-08-19 RX ORDER — CALCIUM CARBONATE 200(500)MG
500 TABLET,CHEWABLE ORAL 3 TIMES DAILY PRN
Qty: 60 TABLET | Refills: 0 | Status: SHIPPED | OUTPATIENT
Start: 2021-08-19 | End: 2021-09-18

## 2021-08-19 RX ORDER — OXYCODONE HYDROCHLORIDE AND ACETAMINOPHEN 5; 325 MG/1; MG/1
1 TABLET ORAL EVERY 6 HOURS PRN
Qty: 30 TABLET | Refills: 0 | Status: SHIPPED | OUTPATIENT
Start: 2021-08-19 | End: 2021-08-22

## 2021-08-19 RX ORDER — LISINOPRIL AND HYDROCHLOROTHIAZIDE 25; 20 MG/1; MG/1
1 TABLET ORAL DAILY
Status: DISCONTINUED | OUTPATIENT
Start: 2021-08-19 | End: 2021-08-19

## 2021-08-19 RX ORDER — ACETAMINOPHEN 325 MG/1
650 TABLET ORAL EVERY 6 HOURS PRN
Status: DISCONTINUED | OUTPATIENT
Start: 2021-08-19 | End: 2021-08-19 | Stop reason: HOSPADM

## 2021-08-19 RX ORDER — HYDROCODONE BITARTRATE AND ACETAMINOPHEN 5; 325 MG/1; MG/1
1 TABLET ORAL EVERY 4 HOURS PRN
Status: DISCONTINUED | OUTPATIENT
Start: 2021-08-19 | End: 2021-08-19 | Stop reason: HOSPADM

## 2021-08-19 RX ORDER — DULOXETIN HYDROCHLORIDE 60 MG/1
60 CAPSULE, DELAYED RELEASE ORAL DAILY
Status: DISCONTINUED | OUTPATIENT
Start: 2021-08-19 | End: 2021-08-19

## 2021-08-19 RX ORDER — HYDROCODONE BITARTRATE AND ACETAMINOPHEN 5; 325 MG/1; MG/1
2 TABLET ORAL EVERY 6 HOURS PRN
Status: DISCONTINUED | OUTPATIENT
Start: 2021-08-19 | End: 2021-08-19 | Stop reason: HOSPADM

## 2021-08-19 RX ADMIN — ENOXAPARIN SODIUM 40 MG: 40 INJECTION SUBCUTANEOUS at 09:02

## 2021-08-19 RX ADMIN — AMLODIPINE BESYLATE 10 MG: 10 TABLET ORAL at 09:02

## 2021-08-19 RX ADMIN — PANTOPRAZOLE SODIUM 40 MG: 40 TABLET, DELAYED RELEASE ORAL at 06:50

## 2021-08-19 RX ADMIN — DULOXETINE HYDROCHLORIDE 60 MG: 60 CAPSULE, DELAYED RELEASE ORAL at 09:02

## 2021-08-19 RX ADMIN — OXYCODONE HYDROCHLORIDE AND ACETAMINOPHEN 1 TABLET: 5; 325 TABLET ORAL at 03:53

## 2021-08-19 RX ADMIN — OXYCODONE HYDROCHLORIDE AND ACETAMINOPHEN 1 TABLET: 5; 325 TABLET ORAL at 10:28

## 2021-08-19 RX ADMIN — DOCUSATE SODIUM 100 MG: 100 CAPSULE, LIQUID FILLED ORAL at 09:01

## 2021-08-19 RX ADMIN — LISINOPRIL 20 MG: 20 TABLET ORAL at 09:02

## 2021-08-19 RX ADMIN — SODIUM CHLORIDE, PRESERVATIVE FREE 10 ML: 5 INJECTION INTRAVENOUS at 07:36

## 2021-08-19 RX ADMIN — SODIUM CHLORIDE: 9 INJECTION, SOLUTION INTRAVENOUS at 03:53

## 2021-08-19 ASSESSMENT — PAIN SCALES - GENERAL
PAINLEVEL_OUTOF10: 4
PAINLEVEL_OUTOF10: 0
PAINLEVEL_OUTOF10: 0
PAINLEVEL_OUTOF10: 9
PAINLEVEL_OUTOF10: 4
PAINLEVEL_OUTOF10: 8

## 2021-08-19 ASSESSMENT — PAIN DESCRIPTION - LOCATION
LOCATION: SHOULDER

## 2021-08-19 ASSESSMENT — PAIN DESCRIPTION - ONSET
ONSET: ON-GOING
ONSET: GRADUAL
ONSET: GRADUAL
ONSET: ON-GOING

## 2021-08-19 ASSESSMENT — PAIN DESCRIPTION - ORIENTATION
ORIENTATION: RIGHT

## 2021-08-19 ASSESSMENT — PAIN DESCRIPTION - DESCRIPTORS
DESCRIPTORS: ACHING
DESCRIPTORS: ACHING;CONSTANT
DESCRIPTORS: ACHING
DESCRIPTORS: ACHING

## 2021-08-19 ASSESSMENT — PAIN DESCRIPTION - FREQUENCY
FREQUENCY: CONTINUOUS

## 2021-08-19 ASSESSMENT — PAIN DESCRIPTION - PROGRESSION
CLINICAL_PROGRESSION: GRADUALLY IMPROVING
CLINICAL_PROGRESSION: NOT CHANGED
CLINICAL_PROGRESSION: NOT CHANGED
CLINICAL_PROGRESSION: GRADUALLY WORSENING

## 2021-08-19 ASSESSMENT — PAIN - FUNCTIONAL ASSESSMENT
PAIN_FUNCTIONAL_ASSESSMENT: ACTIVITIES ARE NOT PREVENTED
PAIN_FUNCTIONAL_ASSESSMENT: PREVENTS OR INTERFERES SOME ACTIVE ACTIVITIES AND ADLS
PAIN_FUNCTIONAL_ASSESSMENT: PREVENTS OR INTERFERES SOME ACTIVE ACTIVITIES AND ADLS
PAIN_FUNCTIONAL_ASSESSMENT: ACTIVITIES ARE NOT PREVENTED

## 2021-08-19 ASSESSMENT — PAIN DESCRIPTION - PAIN TYPE
TYPE: ACUTE PAIN

## 2021-08-19 NOTE — PROGRESS NOTES
Patient assessed and vitals obtained at this time. Pt is alert and oriented x4. Pt assisted up to the bathroom and back tot he chair for breakfast. Pt states her pain is tolerable but it jumps up when she moves her arm. Right radial pulse is +3. Pt denies wanting any pain medcations at this time. Ice pack applied. Pt denies any further needs at this time.

## 2021-08-19 NOTE — PLAN OF CARE
Problem: Pain:  Goal: Pain level will decrease  Description: Pain level will decrease  8/18/2021 2151 by Judy Stauffer RN  Outcome: Ongoing  Note: Pain assessed every 4 hours. Patient is able to communicate with staff the need for pain interventions. Patient currently states pain 9 out of 10. Medicated per orders and educate d on med times. Will continue to monitor. 8/18/2021 2150 by Judy Stauffer RN  Outcome: Ongoing  Note: Pain assessed every 4 hours. Patient is able to communicate with staff the need for pain interventions. Patient currently states pain . Will continue to monitor. Goal: Control of acute pain  Description: Control of acute pain  8/18/2021 2151 by Judy Stauffer RN  Outcome: Ongoing  8/18/2021 2150 by Judy Stauffer RN  Outcome: Ongoing  8/18/2021 1155 by Tenisha Marc RN  Outcome: Ongoing  Goal: Control of chronic pain  Description: Control of chronic pain  8/18/2021 2151 by Judy Stauffer RN  Outcome: Ongoing  8/18/2021 2150 by Judy Stauffer RN  Outcome: Ongoing     Problem: Falls - Risk of:  Goal: Will remain free from falls  Description: Will remain free from falls  8/18/2021 2151 by Judy Stauffer RN  Outcome: Ongoing  Note: Fall assessment completed daily. Bed in lowest position. Call light within reach. Falling star posted to outside of door. Fall risk sticker in place on ID band. Side rails up 2/4. Bed alarm on for safety. Patient ambulates with stand by assist.  Will continue to monitor.      8/18/2021 2150 by Judy Stauffer RN  Outcome: Ongoing  8/18/2021 1155 by Tenisha Marc RN  Outcome: Ongoing  Goal: Absence of physical injury  Description: Absence of physical injury  8/18/2021 2151 by Judy Stauffer RN  Outcome: Ongoing  8/18/2021 2150 by Judy Stauffer RN  Outcome: Ongoing     Problem: Skin Integrity:  Goal: Will show no infection signs and symptoms  Description: Will show no infection signs and symptoms  8/18/2021 2151 by Judy Stauffer RN  Outcome: Ongoing  Note: Skin assessment performed, no breakdown noted at this time. Patient skin is dry and intact. Will continue to monitor for redness or breakdown.      8/18/2021 2150 by Marcy Kan RN  Outcome: Ongoing  8/18/2021 1155 by Anitha Camacho RN  Outcome: Ongoing  Goal: Absence of new skin breakdown  Description: Absence of new skin breakdown  8/18/2021 2151 by Marcy Kan RN  Outcome: Ongoing  8/18/2021 2150 by Marcy Kan RN  Outcome: Ongoing  8/18/2021 1155 by Anitha Camacho RN  Outcome: Ongoing     Problem: Musculor/Skeletal Functional Status  Goal: Highest potential functional level  8/18/2021 2151 by Marcy Kan RN  Outcome: Ongoing  8/18/2021 2150 by Marcy Kan RN  Outcome: Ongoing  Goal: Absence of falls  8/18/2021 2151 by Marcy Kan RN  Outcome: Ongoing  8/18/2021 2150 by Marcy Kan RN  Outcome: Ongoing  8/18/2021 1155 by Anitha Camacho RN  Outcome: Ongoing

## 2021-08-19 NOTE — DISCHARGE SUMMARY
Physician Discharge Summary  Lori Shi MD       Patient ID:  Eulalia Johnson  [id number]  1945    Admission date: 8/15/2021    Discharge date: 8/19/2021     Admitting Physician: Duglas Amanda MD     Primary Care Physician: Kim Mcgill MD     Primary Discharge Diagnoses:   Patient Active Problem List    Diagnosis Date Noted    Acute hyponatremia 08/18/2021    Hyponatremia 08/17/2021    Shoulder joint dislocation 08/16/2021    Shoulder dislocation, right, initial encounter 08/16/2021    Hypokalemia 08/16/2021    Lumbosacral spondylosis without myelopathy 12/13/2016    [facility]ro[facility], not [facility] 09/06/2016    Lumbar post-laminectomy syndrome 06/21/2016       Additional Diagnoses:       Diagnosis Date    Arthritis     Bell's palsy     Hyperlipidemia     Seasonal allergies          Review of Systems:  Constitutional: negative for fevers or chills  Eyes: negative for visual disturbance   ENT: negative for sore throat or nasal congestion  Respiratory: negative for shortness of breath or cough  Cardiovascular: negative for chest pain ,palpitations,pnd,syncope  Gastrointestinal: negative for abd pain, nausea, vomiting, diarrhea , constipation,hemetemesis,tyrese,blood in stool  Genitourinary: negative for dysuria, urgency ,frequency,hematuria  Integument/breast: negative for skin rash or lesions  Neurological: negative for unilateral weakness, numbness or tingling. Skeletal Muscular: has rt shoulder pain,no joint swelling,has chronic back pain              Physical exam:      -----------------------------------------------------------------  Exam:  GEN:   A & O x3, no apparent distress  EYES: No gross abnormalities.   NECK: normal, supple, no lymphadenopathy,  no carotid bruits  PULM: clear to auscultation bilaterally- no wheezes, rales or rhonchi, normal air movement, no respiratory distress  COR: regular rate & rhythm, no murmurs and no gallops  ABD:  soft, non-tender, non-distended, normal bowel sounds, no masses or organomegaly  EXT:   no cyanosis, clubbing or edema present  , rt arm in sling  NEURO: negative  SKIN:  no rashes or significant lesions  -----------------------------------------------------------------          Hospital Course: The patient was admitted for the above. She was treated with close reduction of rt shoulder joint. she continues to have significant pain and requiring pain meds. Her bp was elevated and required oral and iv medications for control. Her hypokalemia improved with replacement but developed acute hyponatremia and was treated with iv saline,gradually improved over the course of her hospitalization. She is transferred to snf for pt,ot and pain control    Consultants:    · Ortho- DR Sanchez Coma    Procedures:    · Close reduction of rt shoulder for fx dislocation    Complications:   · none    Significant Diagnostic Studies:   XR CHEST (SINGLE VIEW FRONTAL)    Result Date: 8/16/2021  EXAMINATION: ONE XRAY VIEW OF THE CHEST 8/16/2021 1:33 am COMPARISON: Right shoulder radiographs 08/15/2021. HISTORY: ORDERING SYSTEM PROVIDED HISTORY: pre-op TECHNOLOGIST PROVIDED HISTORY: pre-op FINDINGS: The cardiomediastinal silhouette is within normal limits. Asymmetric elevation of the right hemidiaphragm. No pneumothorax, vascular congestion, consolidation, or pleural effusion is identified. Thoracic spinal stimulator leads are in place. Anterior dislocation of the right glenohumeral joint with displaced fracture fragments of the greater tuberosity. 1. No acute process in the chest. 2. Anterior dislocation of the right glenohumeral joint with displaced greater tuberosity fracture fragments. XR SHOULDER RIGHT (MIN 2 VIEWS)    Result Date: 8/15/2021  EXAMINATION: 2 XRAY VIEWS OF THE RIGHT SHOULDER 8/15/2021 10:57 pm COMPARISON: None.  HISTORY: ORDERING SYSTEM PROVIDED HISTORY: fall TECHNOLOGIST PROVIDED HISTORY: fall FINDINGS: Anterior glenohumeral dislocation. Acute significantly displaced fracture of the greater tuberosity of the humeral head. No definite glenoid fracture. The acromioclavicular joint is intact. The soft tissues are unremarkable. Anterior glenohumeral dislocation with an acute significantly displaced fracture of the greater tuberosity. FLUORO FOR SURGICAL PROCEDURES    Result Date: 8/16/2021  Radiology exam is complete. No Radiologist dictation. Please follow up with ordering provider.      Recent Results (from the past 96 hour(s))   CBC auto differential    Collection Time: 08/15/21 11:24 PM   Result Value Ref Range    WBC 6.5 3.5 - 11.3 k/uL    RBC 3.76 (L) 3.95 - 5.11 m/uL    Hemoglobin 12.2 11.9 - 15.1 g/dL    Hematocrit 36.2 (L) 36.3 - 47.1 %    MCV 96.3 82.6 - 102.9 fL    MCH 32.4 25.2 - 33.5 pg    MCHC 33.7 28.4 - 34.8 g/dL    RDW 11.6 (L) 11.8 - 14.4 %    Platelets 406 018 - 355 k/uL    MPV 8.9 8.1 - 13.5 fL    NRBC Automated 0.0 0.0 per 100 WBC    Differential Type NOT REPORTED     Seg Neutrophils 62 36 - 65 %    Lymphocytes 21 (L) 24 - 43 %    Monocytes 14 (H) 3 - 12 %    Eosinophils % 1 1 - 4 %    Basophils 1 0 - 2 %    Immature Granulocytes 1 (H) 0 %    Segs Absolute 4.08 1.50 - 8.10 k/uL    Absolute Lymph # 1.35 1.10 - 3.70 k/uL    Absolute Mono # 0.93 0.10 - 1.20 k/uL    Absolute Eos # 0.04 0.00 - 0.44 k/uL    Basophils Absolute 0.03 0.00 - 0.20 k/uL    Absolute Immature Granulocyte 0.08 0.00 - 0.30 k/uL    WBC Morphology NOT REPORTED     RBC Morphology NOT REPORTED     Platelet Estimate NOT REPORTED    Comprehensive Metabolic Panel    Collection Time: 08/15/21 11:24 PM   Result Value Ref Range    Glucose 112 (H) 70 - 99 mg/dL    BUN 16 8 - 23 mg/dL    CREATININE 0.64 0.50 - 0.90 mg/dL    Bun/Cre Ratio 25 (H) 9 - 20    Calcium 9.2 8.6 - 10.4 mg/dL    Sodium 129 (L) 135 - 144 mmol/L    Potassium 3.5 (L) 3.7 - 5.3 mmol/L    Chloride 91 (L) 98 - 107 mmol/L    CO2 20 20 - 31 mmol/L    Anion Gap 18 (H) 9 - 17 mmol/L Alkaline Phosphatase 73 35 - 104 U/L    ALT 17 5 - 33 U/L    AST 32 (H) <32 U/L    Total Bilirubin 0.26 (L) 0.3 - 1.2 mg/dL    Total Protein 7.3 6.4 - 8.3 g/dL    Albumin 4.6 3.5 - 5.2 g/dL    Albumin/Globulin Ratio 1.7 1.0 - 2.5    GFR Non-African American >60 >60 mL/min    GFR African American >60 >60 mL/min    GFR Comment          GFR Staging         APTT    Collection Time: 08/15/21 11:24 PM   Result Value Ref Range    PTT 29.2 23.9 - 33.8 sec   EKG 12 Lead    Collection Time: 08/16/21  2:00 AM   Result Value Ref Range    Ventricular Rate 82 BPM    Atrial Rate 82 BPM    P-R Interval 192 ms    QRS Duration 86 ms    Q-T Interval 370 ms    QTc Calculation (Bazett) 432 ms    P Axis 30 degrees    R Axis 86 degrees    T Axis 74 degrees   CBC auto differential    Collection Time: 08/17/21  6:00 AM   Result Value Ref Range    WBC 7.5 3.5 - 11.3 k/uL    RBC 3.57 (L) 3.95 - 5.11 m/uL    Hemoglobin 11.5 (L) 11.9 - 15.1 g/dL    Hematocrit 34.1 (L) 36.3 - 47.1 %    MCV 95.5 82.6 - 102.9 fL    MCH 32.2 25.2 - 33.5 pg    MCHC 33.7 28.4 - 34.8 g/dL    RDW 11.5 (L) 11.8 - 14.4 %    Platelets 410 050 - 193 k/uL    MPV 8.7 8.1 - 13.5 fL    NRBC Automated 0.0 0.0 per 100 WBC    Differential Type NOT REPORTED     WBC Morphology NOT REPORTED     RBC Morphology NOT REPORTED     Platelet Estimate NOT REPORTED     Seg Neutrophils 82 (H) 36 - 65 %    Lymphocytes 8 (L) 24 - 43 %    Monocytes 10 3 - 12 %    Eosinophils % 0 (L) 1 - 4 %    Immature Granulocytes 0 0 %    Basophils 0 0 - 2 %    Segs Absolute 6.15 1.50 - 8.10 k/uL    Absolute Lymph # 0.60 (L) 1.10 - 3.70 k/uL    Absolute Mono # 0.75 0.10 - 1.20 k/uL    Absolute Eos # 0.00 0.00 - 0.44 k/uL    Absolute Immature Granulocyte 0.00 0.00 - 0.30 k/uL    Basophils Absolute 0.00 0.0 - 0.2 k/uL    Morphology Normal    Basic Metabolic Panel    Collection Time: 08/17/21  6:00 AM   Result Value Ref Range    Glucose 124 (H) 70 - 99 mg/dL    BUN 14 8 - 23 mg/dL    CREATININE 0.60 0.50 - 0.90 Gap 11 9 - 17 mmol/L    GFR Non-African American >60 >60 mL/min    GFR African American >60 >60 mL/min    GFR Comment          GFR Staging         Sodium, urine, random    Collection Time: 08/18/21  9:55 AM   Result Value Ref Range    Sodium,Ur <20 mmol/L   Osmolality, Urine    Collection Time: 08/18/21  9:55 AM   Result Value Ref Range    Osmolality, Ur 396 80 - 1300 mOsm/kg   Sodium    Collection Time: 08/18/21  2:17 PM   Result Value Ref Range    Sodium 126 (L) 135 - 144 mmol/L   Sodium    Collection Time: 08/18/21 10:40 PM   Result Value Ref Range    Sodium 129 (L) 135 - 144 mmol/L   CBC auto differential    Collection Time: 08/19/21  6:07 AM   Result Value Ref Range    WBC 4.1 3.5 - 11.3 k/uL    RBC 3.25 (L) 3.95 - 5.11 m/uL    Hemoglobin 10.6 (L) 11.9 - 15.1 g/dL    Hematocrit 31.6 (L) 36.3 - 47.1 %    MCV 97.2 82.6 - 102.9 fL    MCH 32.6 25.2 - 33.5 pg    MCHC 33.5 28.4 - 34.8 g/dL    RDW 11.7 (L) 11.8 - 14.4 %    Platelets 280 389 - 724 k/uL    MPV 8.6 8.1 - 13.5 fL    NRBC Automated 0.0 0.0 per 100 WBC    Differential Type NOT REPORTED     Seg Neutrophils 55 36 - 65 %    Lymphocytes 20 (L) 24 - 43 %    Monocytes 20 (H) 3 - 12 %    Eosinophils % 3 1 - 4 %    Basophils 1 0 - 2 %    Immature Granulocytes 1 (H) 0 %    Segs Absolute 2.28 1.50 - 8.10 k/uL    Absolute Lymph # 0.83 (L) 1.10 - 3.70 k/uL    Absolute Mono # 0.83 0.10 - 1.20 k/uL    Absolute Eos # 0.11 0.00 - 0.44 k/uL    Basophils Absolute 0.03 0.00 - 0.20 k/uL    Absolute Immature Granulocyte <0.03 0.00 - 0.30 k/uL    WBC Morphology NOT REPORTED     RBC Morphology NOT REPORTED     Platelet Estimate NOT REPORTED    Basic Metabolic Panel    Collection Time: 08/19/21  6:07 AM   Result Value Ref Range    Glucose 96 70 - 99 mg/dL    BUN 11 8 - 23 mg/dL    CREATININE 0.57 0.50 - 0.90 mg/dL    Bun/Cre Ratio 19 9 - 20    Calcium 8.8 8.6 - 10.4 mg/dL    Sodium 134 (L) 135 - 144 mmol/L    Potassium 4.3 3.7 - 5.3 mmol/L    Chloride 100 98 - 107 mmol/L    CO2 24 20 - 31 mmol/L    Anion Gap 10 9 - 17 mmol/L    GFR Non-African American >60 >60 mL/min    GFR African American >60 >60 mL/min    GFR Comment          GFR Staging         TSH with Reflex    Collection Time: 08/19/21  6:07 AM   Result Value Ref Range    TSH 1.46 0.30 - 5.00 mIU/L     ·     Discharge Condition:   · stable    Disposition:   · SNF    Discharge Medications:     Hamp Faustino   Home Medication Instructions [id number]    Printed on:08/19/21 1882   Medication Information                      aspirin 81 MG tablet  Take 81 mg by mouth daily             Aspirin-Acetaminophen-Caffeine (EXCEDRIN PO)  Take 2 tablets by mouth daily             calcium carbonate (TUMS) 500 MG chewable tablet  Take 1 tablet by mouth 3 times daily as needed for Heartburn             Coenzyme Q10 (CO Q 10) 100 MG CAPS  Take 2 tablets by mouth daily             DULoxetine (CYMBALTA) 60 MG extended release capsule  Take 60 mg by mouth daily             enoxaparin (LOVENOX) 40 MG/0.4ML injection  Inject 0.4 mLs into the skin daily for 10 days             Loratadine 10 MG CAPS  Take by mouth daily             Multiple Vitamins-Minerals (MULTIVITAMIN & MINERAL PO)  Take by mouth             oxyCODONE-acetaminophen (PERCOCET) 5-325 MG per tablet  Take 1 tablet by mouth every 6 hours as needed for Pain for up to 3 days.              simvastatin (ZOCOR) 20 MG tablet  Take 20 mg by mouth nightly             vitamin E 1000 UNITS capsule  Take 1,000 Units by mouth daily             zolpidem (AMBIEN) 5 MG tablet  Take 5 mg by mouth nightly as needed for Sleep                 · Resume all home medications unless otherwise directed  · Add norvasc 10 mg daily,lisinopril 20 mg daily,lovenox,percocet 5/325  · Stop taking lisinopril/hctz    Patient Instructions:   · Activity: activity as tolerated  · Diet: regular diet  · Wound Care: none needed  · Other: rt arm in sling  · Follow up with PCP and DR Mitra Carcamo in 1 wk as directed      Time Spent on discharge services is 40 minutes in the examination, evaluation, counseling and review of medications and discharge plan.     Signed:  Duglas Amanda MD, M.D.  8/19/2021  7:23 AM

## 2021-08-19 NOTE — PROGRESS NOTES
Progress Note  Lori Shi MD    OBJECTIVE:    Patient seen for f/u of Shoulder dislocation, right, initial encounter. She has pain rt shoulder requiring pain meds  bp better  Na 134      ROS:   Constitutional: negative  for fevers, and negative for chills. Respiratory: negative for shortness of breath, negative for cough, and negative for wheezing  Cardiovascular: negative for chest pain, and negative for palpitations  Gastrointestinal: negative for abdominal pain, negative for nausea,negative for vomiting, negative for diarrhea, and negative for constipation  Has significant  Rt shoulder pain   All other systems were reviewed with the patient and are negative unless otherwise stated in HPI    OBJECTIVE:  Vitals:   Temp: 98.2 °F (36.8 °C)  BP: (!) 152/74  Resp: 16  Pulse: 78  SpO2: 94 %    24HR INTAKE/OUTPUT:      Intake/Output Summary (Last 24 hours) at 8/19/2021 0710  Last data filed at 8/19/2021 0645  Gross per 24 hour   Intake 3533.59 ml   Output 3000 ml   Net 533.59 ml     -----------------------------------------------------------------  Exam:  GEN:    Awake, alert and oriented x3. EYES:  EOMI, pupils equal   NECK: Supple. No lymphadenopathy. No carotid bruit  CVS:    regular rate and rhythm, no audible murmur  PULM:  CTA, no wheezes, rales or rhonchi, no acute respiratory distress  ABD:    Bowels sounds normal.  Abdomen is soft. No distention. no tenderness to palpation. EXT: rt shoulder in sling,  no edema bilaterally . No calf tenderness. NEURO: Moves all extremities. Motor and sensory are grossly intact  SKIN:  No rashes.   No skin lesions.    -----------------------------------------------------------------  Diagnostic Data:    · All available data reviewed  Lab Results   Component Value Date    WBC 4.1 08/19/2021    HGB 10.6 (L) 08/19/2021    MCV 97.2 08/19/2021     08/19/2021      Lab Results   Component Value Date    GLUCOSE 96 08/19/2021    BUN 11 08/19/2021    CREATININE 0.57 08/19/2021     (L) 08/19/2021    K 4.3 08/19/2021    CALCIUM 8.8 08/19/2021     08/19/2021    CO2 24 08/19/2021       PROBLEM LIST:  Principal Problem:    Shoulder dislocation, right, initial encounter  Active Problems:    Shoulder joint dislocation    Hypokalemia    Hyponatremia    Acute hyponatremia  Resolved Problems:    * No resolved hospital problems. *      ASSESSMENT / PLAN:  Shoulder dislocation, right, initial encounter  · Pain control,pt and ot  · HTN- continue norvasc and lisinopril  · Hyponatremia- Na better, d/c to snf  · Nutrition status:  Well developed, well nourished with no malnutrition  · DVT prophylaxis: Lovenox   · High risk medications: none   · Disposition:  Discharge plan is ECF    Mahad Devlin MD , M.D.  8/19/2021  7:10 AM

## 2021-08-19 NOTE — PROGRESS NOTES
Physical Therapy  Facility/Department: [facility] hospital [facility] THE [facility] MED SURG  Daily Treatment Note  NAME: Livia Sotelo  : 1945  MRN: [id number]    Date of Service: 2021    Discharge Recommendations:  Continue to assess pending progress, Subacute/Skilled Nursing Facility, Home with Home health PT        Assessment   Treatment Diagnosis: Impaired balance, s/p R shoulder dislocation  Prognosis: Good  PT Education: Goals; General Safety;Gait Training;PT Role  Patient Education: Pt educted on above with appropriate understanding. REQUIRES PT FOLLOW UP: Yes  Activity Tolerance  Activity Tolerance: Patient Tolerated treatment well     Patient Diagnosis(es): The primary encounter diagnosis was Dislocation of right shoulder joint, initial encounter. Diagnoses of Closed fracture of right shoulder, initial encounter and Shoulder dislocation, right, initial encounter were also pertinent to this visit. has a past medical history of Arthritis, Bell's palsy, Hyperlipidemia, and Seasonal allergies. has a past surgical history that includes  section; Colonoscopy (); Appendectomy; other surgical history (Right, 2016); back surgery; other surgical history (Right, 2016); other surgical history (2017); Nerve Block Lumb Facet Level 1 Bilateral (Right, 2017); Humerus Closed Reduction (Right, 2021); and Humerus Closed Reduction (Right, 2021). Restrictions  Restrictions/Precautions  Restrictions/Precautions: General Precautions  Required Braces or Orthoses?: Yes  Required Braces or Orthoses  Right Upper Extremity Brace/Splint: Sling  Subjective   General  Chart Reviewed: Yes  Response To Previous Treatment: Patient with no complaints from previous session. Family / Caregiver Present: No  Referring Practitioner: Pooja Nelson MD  Subjective  Subjective: Pt stated pain is a 5/10 at rest today and a 9/10 with movement.           Orientation  Orientation  Overall Orientation Status: Within Functional Limits  Cognition      Objective   Bed mobility  Comment: Pt in chair upon arrival for therapy. Transfers  Sit to Stand: Contact guard assistance;Stand by assistance  Stand to sit: Contact guard assistance;Stand by assistance  Comment: Pt educated on hand placement upon descending to chair. Ambulation  Ambulation?: Yes  Ambulation 1  Surface: level tile  Device: Single point cane  Assistance: Contact guard assistance  Quality of Gait: reciprocal gait pattern with forward flexed posture. Quick pace with noted path deviation at times and NBOS. Distance: 75'x2  Comments: Pt educated for proper sequencing with SPC. Exercises  Hip Flexion: 20  Hip Abduction: 20  Knee Long Arc Quad: 20  Ankle Pumps: 20  Comments: Pt completed above listed ex in seated position. G-Code     OutComes Score    AM-PAC Score    Goals  Short term goals  Time Frame for Short term goals: 10 days  Short term goal 1: Patient will ambulate 80' with supervision, without LOB  Short term goal 2: Patient will perform bed mobility and transfers with MI  Short term goal 3: Patient will tolerate 20-30 minutes of therex/act to improve endurance for ADLs  Short term goal 4: Patient will improve dynamic standing balance to good to decrease fall risk. Patient Goals   Patient goals : Feel better    Plan    Plan  Times per week: 7  Times per day: Twice a day  Plan Comment: 1x/day on weekends  Safety Devices  Type of devices:  All fall risk precautions in place, Call light within reach, Gait belt, Patient at risk for falls, Nurse notified, Left in chair     Therapy Time   Individual Concurrent Group Co-treatment   Time In 0740         Time Out 0805         Minutes Reno Arnold 78, PTA

## 2021-08-19 NOTE — PROGRESS NOTES
Pt requests a percocet at this time. Pt updated on ETA of leaving at 1430. Pt verbalizes understanding.

## 2021-08-19 NOTE — PROGRESS NOTES
Patient is discharge today to the [facility]. Discharge paper work done and fax to SNF. The [facility] will provide the transportation.     SANGITA Radford

## 2021-08-19 NOTE — PROGRESS NOTES
Physical Therapy  Facility/Department: [facility] AT THE [facility] MED SURG  Daily Treatment Note  NAME: Jean Carlos Tran  : 1945  MRN: [id number]    Date of Service: 2021    Discharge Recommendations:  Continue to assess pending progress, Subacute/Skilled Nursing Facility, Home with Home health PT        Assessment   Treatment Diagnosis: Impaired balance, s/p R shoulder dislocation  Prognosis: Good  PT Education: Goals; General Safety;Gait Training;PT Role  Patient Education: Pt educted on above with appropriate understanding. REQUIRES PT FOLLOW UP: Yes  Activity Tolerance  Activity Tolerance: Patient Tolerated treatment well     Patient Diagnosis(es): The primary encounter diagnosis was Dislocation of right shoulder joint, initial encounter. Diagnoses of Closed fracture of right shoulder, initial encounter and Shoulder dislocation, right, initial encounter were also pertinent to this visit. has a past medical history of Arthritis, Bell's palsy, Hyperlipidemia, and Seasonal allergies. has a past surgical history that includes  section; Colonoscopy (); Appendectomy; other surgical history (Right, 2016); back surgery; other surgical history (Right, 2016); other surgical history (2017); Nerve Block Lumb Facet Level 1 Bilateral (Right, 2017); Humerus Closed Reduction (Right, 2021); and Humerus Closed Reduction (Right, 2021). Restrictions  Restrictions/Precautions  Restrictions/Precautions: General Precautions  Required Braces or Orthoses?: Yes  Required Braces or Orthoses  Right Upper Extremity Brace/Splint: Sling  Subjective   General  Chart Reviewed: Yes  Response To Previous Treatment: Patient with no complaints from previous session. Family / Caregiver Present: No  Referring Practitioner: Monique Hendricks MD  Subjective  Subjective: Pt pleasant and cooperative. Pt [facility]  Pt reports right elbow soreness at this time. Orientation  Orientation  Overall Orientation Status: Within Functional Limits  Cognition      Objective   Bed mobility  Comment: Pt up in chair upon therapist entry  Transfers  Sit to Stand: Contact guard assistance;Stand by assistance  Stand to sit: Contact guard assistance;Stand by assistance  Comment: Pt educated on hand placement upon descending to chair. Ambulation  Ambulation?: Yes  WB Status: FWB  Ambulation 1  Surface: level tile  Device: Single point cane  Assistance: Contact guard assistance  Quality of Gait: reciprocal gait pattern with forward flexed posture. Quick pace with noted path deviation at times and NBOS. Distance: 75'x2  Comments: Pt educated for proper sequencing with SPC. Exercises  Comments: Declined at this time. G-Code     OutComes Score    AM-PAC Score    Goals  Short term goals  Time Frame for Short term goals: 10 days  Short term goal 1: Patient will ambulate [facility]  1560' with supervision, without LOB  Short term goal 2: Patient will perform bed mobility and transfers with MI  Short term goal 3: Patient will tolerate 20-30 minutes of therex/act to improve endurance for ADLs  Short term goal 4: Patient will improve dynamic standing balance to good to decrease fall risk. Patient Goals   Patient goals : Feel better    Plan    Plan  Times per week: 7  Times per day: Twice a day  Plan Comment: 1x/day on weekends  Safety Devices  Type of devices:  All fall risk precautions in place, Call light within reach, Gait belt, Patient at risk for falls, Nurse notified, Left in chair     Therapy Time   Individual Concurrent Group Co-treatment   Time In 1227         Time Out 1252         Minutes Reno Arnold 78, PTA

## 2021-09-22 ENCOUNTER — HOSPITAL ENCOUNTER (OUTPATIENT)
Dept: MRI IMAGING | Age: 76
Discharge: HOME | End: 2021-09-24
Payer: MEDICARE

## 2021-09-22 DIAGNOSIS — S41.001A OPEN ANTERIOR DISLOCATION OF RIGHT SHOULDER, INITIAL ENCOUNTER: ICD-10-CM

## 2021-09-22 DIAGNOSIS — S43.014A OPEN ANTERIOR DISLOCATION OF RIGHT SHOULDER, INITIAL ENCOUNTER: ICD-10-CM

## 2021-09-22 PROCEDURE — 73221 MRI JOINT UPR EXTREM W/O DYE: CPT

## 2021-10-04 ENCOUNTER — HOSPITAL ENCOUNTER (OUTPATIENT)
Dept: PREADMISSION TESTING | Age: 76
Discharge: HOME | End: 2021-10-08
Payer: MEDICARE

## 2021-10-04 VITALS
BODY MASS INDEX: 26.74 KG/M2 | TEMPERATURE: 96.9 F | DIASTOLIC BLOOD PRESSURE: 78 MMHG | SYSTOLIC BLOOD PRESSURE: 139 MMHG | RESPIRATION RATE: 19 BRPM | HEIGHT: 60 IN | WEIGHT: 136.2 LBS | OXYGEN SATURATION: 95 % | HEART RATE: 86 BPM

## 2021-10-04 LAB
ABO/RH: NORMAL
ABSOLUTE EOS #: 0.05 K/UL (ref 0–0.44)
ABSOLUTE IMMATURE GRANULOCYTE: <0.03 K/UL (ref 0–0.3)
ABSOLUTE LYMPH #: 0.75 K/UL (ref 1.1–3.7)
ABSOLUTE MONO #: 0.73 K/UL (ref 0.1–1.2)
ANION GAP SERPL CALCULATED.3IONS-SCNC: 13 MMOL/L (ref 9–17)
ANTIBODY SCREEN: NEGATIVE
ARM BAND NUMBER: NORMAL
BASOPHILS # BLD: 1 % (ref 0–2)
BASOPHILS ABSOLUTE: 0.03 K/UL (ref 0–0.2)
BUN BLDV-MCNC: 21 MG/DL (ref 8–23)
BUN/CREAT BLD: 26 (ref 9–20)
CALCIUM SERPL-MCNC: 9.2 MG/DL (ref 8.6–10.4)
CHLORIDE BLD-SCNC: 97 MMOL/L (ref 98–107)
CO2: 22 MMOL/L (ref 20–31)
CREAT SERPL-MCNC: 0.82 MG/DL (ref 0.5–0.9)
DIFFERENTIAL TYPE: ABNORMAL
EOSINOPHILS RELATIVE PERCENT: 1 % (ref 1–4)
EXPIRATION DATE: NORMAL
GFR AFRICAN AMERICAN: >60 ML/MIN
GFR NON-AFRICAN AMERICAN: >60 ML/MIN
GFR SERPL CREATININE-BSD FRML MDRD: ABNORMAL ML/MIN/{1.73_M2}
GFR SERPL CREATININE-BSD FRML MDRD: ABNORMAL ML/MIN/{1.73_M2}
GLUCOSE BLD-MCNC: 110 MG/DL (ref 70–99)
HCT VFR BLD CALC: 38.9 % (ref 36.3–47.1)
HEMOGLOBIN: 12.4 G/DL (ref 11.9–15.1)
IMMATURE GRANULOCYTES: 0 %
LYMPHOCYTES # BLD: 15 % (ref 24–43)
MCH RBC QN AUTO: 31.6 PG (ref 25.2–33.5)
MCHC RBC AUTO-ENTMCNC: 31.9 G/DL (ref 28.4–34.8)
MCV RBC AUTO: 99 FL (ref 82.6–102.9)
MONOCYTES # BLD: 14 % (ref 3–12)
NRBC AUTOMATED: 0 PER 100 WBC
PDW BLD-RTO: 12 % (ref 11.8–14.4)
PLATELET # BLD: 243 K/UL (ref 138–453)
PLATELET ESTIMATE: ABNORMAL
PMV BLD AUTO: 8.7 FL (ref 8.1–13.5)
POTASSIUM SERPL-SCNC: 4.5 MMOL/L (ref 3.7–5.3)
RBC # BLD: 3.93 M/UL (ref 3.95–5.11)
RBC # BLD: ABNORMAL 10*6/UL
SEG NEUTROPHILS: 69 % (ref 36–65)
SEGMENTED NEUTROPHILS ABSOLUTE COUNT: 3.54 K/UL (ref 1.5–8.1)
SODIUM BLD-SCNC: 132 MMOL/L (ref 135–144)
WBC # BLD: 5.1 K/UL (ref 3.5–11.3)
WBC # BLD: ABNORMAL 10*3/UL

## 2021-10-04 PROCEDURE — 36415 COLL VENOUS BLD VENIPUNCTURE: CPT

## 2021-10-04 PROCEDURE — 87641 MR-STAPH DNA AMP PROBE: CPT

## 2021-10-04 PROCEDURE — 80048 BASIC METABOLIC PNL TOTAL CA: CPT

## 2021-10-04 PROCEDURE — 86900 BLOOD TYPING SEROLOGIC ABO: CPT

## 2021-10-04 PROCEDURE — 85025 COMPLETE CBC W/AUTO DIFF WBC: CPT

## 2021-10-04 PROCEDURE — 86901 BLOOD TYPING SEROLOGIC RH(D): CPT

## 2021-10-04 PROCEDURE — 86850 RBC ANTIBODY SCREEN: CPT

## 2021-10-04 RX ORDER — ACETAMINOPHEN 325 MG/1
650 TABLET ORAL ONCE
Status: CANCELLED | OUTPATIENT
Start: 2021-10-04 | End: 2021-10-04

## 2021-10-04 RX ORDER — SODIUM CHLORIDE, SODIUM LACTATE, POTASSIUM CHLORIDE, CALCIUM CHLORIDE 600; 310; 30; 20 MG/100ML; MG/100ML; MG/100ML; MG/100ML
INJECTION, SOLUTION INTRAVENOUS CONTINUOUS
Status: CANCELLED | OUTPATIENT
Start: 2021-10-04

## 2021-10-04 RX ORDER — TRANEXAMIC ACID 650 1/1
1300 TABLET ORAL ONCE
Status: CANCELLED | OUTPATIENT
Start: 2021-10-04

## 2021-10-04 RX ORDER — CEFAZOLIN SODIUM 1 G/50ML
1000 SOLUTION INTRAVENOUS ONCE
Status: CANCELLED | OUTPATIENT
Start: 2021-10-04 | End: 2021-10-04

## 2021-10-04 RX ORDER — LISINOPRIL 20 MG/1
20 TABLET ORAL DAILY
COMMUNITY

## 2021-10-04 RX ORDER — CELECOXIB 200 MG/1
400 CAPSULE ORAL ONCE
Status: CANCELLED | OUTPATIENT
Start: 2021-10-04 | End: 2021-10-04

## 2021-10-04 ASSESSMENT — PAIN DESCRIPTION - PAIN TYPE: TYPE: ACUTE PAIN

## 2021-10-04 ASSESSMENT — PAIN DESCRIPTION - ORIENTATION: ORIENTATION: RIGHT

## 2021-10-04 ASSESSMENT — PAIN DESCRIPTION - LOCATION: LOCATION: SHOULDER

## 2021-10-04 ASSESSMENT — PAIN SCALES - GENERAL: PAINLEVEL_OUTOF10: 5

## 2021-10-04 NOTE — PROGRESS NOTES
Patient instructed on the pre-operative, intra-operative, and post-operative process. Patient's surgery arrival time to the hospital and surgery start time confirmed for the day of surgery. Patient instructed on NPO status. Medication instructions reviewed with patient. CHG pre-operative wash instructions given to patient. Pre operative instruction sheet reviewed and given to patient in PAT.

## 2021-10-04 NOTE — PROGRESS NOTES
[facility]   Preadmission Testing    Name: Julian Das  : 1945  Patient Phone: [phone] (home)     Procedure Rt. Total Reverse Shoulder  Date of Procedure: 10/18/21  Surgeon: No att. providers found    Ht:  5'0\"  Wt: 136.2lb  Wt method: actual    Allergies: Allergies   Allergen Reactions    Ciprofloxacin Swelling       Peanut allergy: No    Latex Allergy Screening Tool  Have you ever had a reaction to or been told by a physician that you have an allergy to latex or natural rubber?: No    Vitals:    10/04/21 1344   BP: 139/78   Pulse: 86   Resp: 19   Temp: 96.9 °F (36.1 °C)   SpO2: 95%       No LMP recorded. Patient is postmenopausal.    Do you take blood thinners? [] Yes    [x] No         Instructed to stop blood thinners prior to procedure? [x] Yes    [] No      [] N/A   Do you have sleep apnea? [] Yes    [x] No     Instructed to bring CPAP machine? [] Yes    [] No    [x] N/A   Do you have acid reflux ? [] Yes    [x] No     Do you have  hiatal hernia? [] Yes    [x] No    Do you ever experience motion sickness? [] Yes    [x] No     Have you had a respiratory infection or sore throat in last 4 weeks before surgery? [] Yes    [x] No     Do you have poorly controlled asthma or COPD? [] Yes    [x] No     Do you have a history of angina in the last month or symptomatic arrhythmia? [] Yes    [x] No     Do you have significant central nervous system disease? [] Yes    [x] No     Have you had an EKG, labs, or chest xray in last 12 months? If yes provide copies to anesthesia   [x] Yes    [] No       [x] Lab    [x] EKG    [] CXR     Have you had a stress test?     [x] Yes    [] No    When/where:unsure  Was it normal?    [x] Yes    [] No   Do you or your family have a history of Malignant Hyperthermia?  [] Yes    [x] No     Patient instructed on:   [x] NPO Status   [x] Meds to Take Day of Surgery  [x] Ride Home  [x]No Jewelry/Contact Lenses/Dentures day of surgery   [x] Chlorhexidene             PAT Call/Visit Questions  Person Interviewed: patient  Relationship to Patient: self  Surgery Time Verified: Yes  Surgery Location Verified: Yes  Patient Language: English  Medical History Reviewed: Yes  NPO Status Reinforced: Yes  Ride and Caregiver Arranged: Yes  Ride Caregiver Provider: daughter  Patient Knows to Bring Current Medications: Yes    Pre-AdmissionTesting Checklist  Patient has been to this health system before?: Yes, W/in last 6 months  Does patient refuse blood?: No  Pre-existing DNR Comfort Care/DNR Arrest/DNI Order: Yes, notify physician for order  Healthcare Directive: Yes, patient has an advance directive for healthcare treatment  Type of Healthcare Directive: Living will, Durable power of  for health care  Copy in Chart: No, copy requested from family   needed: No  Patient can read and write?: Yes  Meds-to-Beds: Does the patient want to have any new prescriptions delivered to bedside prior to discharge?: No  History given by: Patient  Providing self care at home?: Yes  Assistive Devices with patient: Other - Comment (sling)  Discharge transport (for same day patients): Family    Patient instructed on the pre-operative, intra-operative, and post-operative process? Yes  Medication instructions reviewed with patient? Yes  Pre operative instruction sheet reviewed and given to patient in PAT?   Yes

## 2021-10-05 LAB
MRSA, DNA, NASAL: NORMAL
SPECIMEN DESCRIPTION: NORMAL

## 2021-10-15 ENCOUNTER — ANESTHESIA EVENT (OUTPATIENT)
Dept: OPERATING ROOM | Age: 76
End: 2021-10-15
Payer: MEDICARE

## 2021-10-18 ENCOUNTER — ANESTHESIA (OUTPATIENT)
Dept: OPERATING ROOM | Age: 76
End: 2021-10-18
Payer: MEDICARE

## 2021-10-18 ENCOUNTER — HOSPITAL ENCOUNTER (OUTPATIENT)
Age: 76
Setting detail: OBSERVATION
Discharge: HOME | End: 2021-10-19
Attending: ORTHOPAEDIC SURGERY | Admitting: ORTHOPAEDIC SURGERY
Payer: MEDICARE

## 2021-10-18 VITALS — TEMPERATURE: 97.7 F | SYSTOLIC BLOOD PRESSURE: 177 MMHG | OXYGEN SATURATION: 98 % | DIASTOLIC BLOOD PRESSURE: 107 MMHG

## 2021-10-18 DIAGNOSIS — Z96.611 STATUS POST REVERSE TOTAL REPLACEMENT OF RIGHT SHOULDER: Primary | ICD-10-CM

## 2021-10-18 PROBLEM — Z96.619 STATUS POST REVERSE TOTAL SHOULDER REPLACEMENT: Status: ACTIVE | Noted: 2021-10-18

## 2021-10-18 PROCEDURE — 2720000010 HC SURG SUPPLY STERILE: Performed by: ORTHOPAEDIC SURGERY

## 2021-10-18 PROCEDURE — G0378 HOSPITAL OBSERVATION PER HR: HCPCS

## 2021-10-18 PROCEDURE — 2709999900 HC NON-CHARGEABLE SUPPLY: Performed by: ORTHOPAEDIC SURGERY

## 2021-10-18 PROCEDURE — 64415 NJX AA&/STRD BRCH PLXS IMG: CPT | Performed by: NURSE ANESTHETIST, CERTIFIED REGISTERED

## 2021-10-18 PROCEDURE — 2580000003 HC RX 258: Performed by: ORTHOPAEDIC SURGERY

## 2021-10-18 PROCEDURE — 7100000001 HC PACU RECOVERY - ADDTL 15 MIN: Performed by: ORTHOPAEDIC SURGERY

## 2021-10-18 PROCEDURE — 6360000002 HC RX W HCPCS: Performed by: ORTHOPAEDIC SURGERY

## 2021-10-18 PROCEDURE — 6370000000 HC RX 637 (ALT 250 FOR IP): Performed by: ORTHOPAEDIC SURGERY

## 2021-10-18 PROCEDURE — C1776 JOINT DEVICE (IMPLANTABLE): HCPCS | Performed by: ORTHOPAEDIC SURGERY

## 2021-10-18 PROCEDURE — 7100000000 HC PACU RECOVERY - FIRST 15 MIN: Performed by: ORTHOPAEDIC SURGERY

## 2021-10-18 PROCEDURE — 6360000002 HC RX W HCPCS: Performed by: NURSE ANESTHETIST, CERTIFIED REGISTERED

## 2021-10-18 PROCEDURE — 3600000014 HC SURGERY LEVEL 4 ADDTL 15MIN: Performed by: ORTHOPAEDIC SURGERY

## 2021-10-18 PROCEDURE — 3600000004 HC SURGERY LEVEL 4 BASE: Performed by: ORTHOPAEDIC SURGERY

## 2021-10-18 PROCEDURE — 97165 OT EVAL LOW COMPLEX 30 MIN: CPT

## 2021-10-18 PROCEDURE — 3700000000 HC ANESTHESIA ATTENDED CARE: Performed by: ORTHOPAEDIC SURGERY

## 2021-10-18 PROCEDURE — 2500000003 HC RX 250 WO HCPCS: Performed by: ORTHOPAEDIC SURGERY

## 2021-10-18 PROCEDURE — 3700000001 HC ADD 15 MINUTES (ANESTHESIA): Performed by: ORTHOPAEDIC SURGERY

## 2021-10-18 PROCEDURE — 94761 N-INVAS EAR/PLS OXIMETRY MLT: CPT

## 2021-10-18 PROCEDURE — 2500000003 HC RX 250 WO HCPCS: Performed by: NURSE ANESTHETIST, CERTIFIED REGISTERED

## 2021-10-18 DEVICE — SPHERE GLEN DIA36MM REG STD CO CHROM TAPR FIT FOR COMPHSVE: Type: IMPLANTABLE DEVICE | Site: SHOULDER | Status: FUNCTIONAL

## 2021-10-18 DEVICE — SCREW BNE L35MM DIA4.75MM HD DIA3.5MM CORT FIX ANG: Type: IMPLANTABLE DEVICE | Site: SHOULDER | Status: FUNCTIONAL

## 2021-10-18 DEVICE — BEARING HUM STD 36 MM SHLDR VIVACIT-E PROLONG COMPHSVE: Type: IMPLANTABLE DEVICE | Site: SHOULDER | Status: FUNCTIONAL

## 2021-10-18 DEVICE — SCREW BNE L20MM DIA6.5MM HEX HD DIA3.5MM FOR COMPHSVE CONV: Type: IMPLANTABLE DEVICE | Site: SHOULDER | Status: FUNCTIONAL

## 2021-10-18 DEVICE — STEM HUM 10MM MINI SHLDR CO CHROM COMPHSVE REV PRI CEM: Type: IMPLANTABLE DEVICE | Site: SHOULDER | Status: FUNCTIONAL

## 2021-10-18 DEVICE — IMPLANTABLE DEVICE: Type: IMPLANTABLE DEVICE | Site: SHOULDER | Status: FUNCTIONAL

## 2021-10-18 DEVICE — SCREW BNE L30MM DIA4.75MM HD DIA3.5MM CORT FIX ANG: Type: IMPLANTABLE DEVICE | Site: SHOULDER | Status: FUNCTIONAL

## 2021-10-18 RX ORDER — HYDROCODONE BITARTRATE AND ACETAMINOPHEN 5; 325 MG/1; MG/1
2 TABLET ORAL EVERY 6 HOURS PRN
Status: DISCONTINUED | OUTPATIENT
Start: 2021-10-18 | End: 2021-10-19 | Stop reason: HOSPADM

## 2021-10-18 RX ORDER — SODIUM CHLORIDE, SODIUM LACTATE, POTASSIUM CHLORIDE, CALCIUM CHLORIDE 600; 310; 30; 20 MG/100ML; MG/100ML; MG/100ML; MG/100ML
INJECTION, SOLUTION INTRAVENOUS CONTINUOUS
Status: DISCONTINUED | OUTPATIENT
Start: 2021-10-18 | End: 2021-10-19

## 2021-10-18 RX ORDER — ONDANSETRON 2 MG/ML
INJECTION INTRAMUSCULAR; INTRAVENOUS PRN
Status: DISCONTINUED | OUTPATIENT
Start: 2021-10-18 | End: 2021-10-18 | Stop reason: SDUPTHER

## 2021-10-18 RX ORDER — FENTANYL CITRATE 50 UG/ML
INJECTION, SOLUTION INTRAMUSCULAR; INTRAVENOUS PRN
Status: DISCONTINUED | OUTPATIENT
Start: 2021-10-18 | End: 2021-10-18 | Stop reason: SDUPTHER

## 2021-10-18 RX ORDER — ONDANSETRON 2 MG/ML
4 INJECTION INTRAMUSCULAR; INTRAVENOUS
Status: DISCONTINUED | OUTPATIENT
Start: 2021-10-18 | End: 2021-10-18 | Stop reason: HOSPADM

## 2021-10-18 RX ORDER — FENTANYL CITRATE 50 UG/ML
50 INJECTION, SOLUTION INTRAMUSCULAR; INTRAVENOUS EVERY 5 MIN PRN
Status: DISCONTINUED | OUTPATIENT
Start: 2021-10-18 | End: 2021-10-18 | Stop reason: HOSPADM

## 2021-10-18 RX ORDER — DULOXETIN HYDROCHLORIDE 60 MG/1
60 CAPSULE, DELAYED RELEASE ORAL DAILY
Status: DISCONTINUED | OUTPATIENT
Start: 2021-10-19 | End: 2021-10-19 | Stop reason: HOSPADM

## 2021-10-18 RX ORDER — ACETAMINOPHEN 325 MG/1
650 TABLET ORAL ONCE
Status: DISCONTINUED | OUTPATIENT
Start: 2021-10-18 | End: 2021-10-19 | Stop reason: HOSPADM

## 2021-10-18 RX ORDER — HYDROCODONE BITARTRATE AND ACETAMINOPHEN 5; 325 MG/1; MG/1
1 TABLET ORAL PRN
Status: DISCONTINUED | OUTPATIENT
Start: 2021-10-18 | End: 2021-10-18 | Stop reason: HOSPADM

## 2021-10-18 RX ORDER — ROPIVACAINE HYDROCHLORIDE 5 MG/ML
INJECTION, SOLUTION EPIDURAL; INFILTRATION; PERINEURAL PRN
Status: DISCONTINUED | OUTPATIENT
Start: 2021-10-18 | End: 2021-10-18 | Stop reason: SDUPTHER

## 2021-10-18 RX ORDER — DEXAMETHASONE SODIUM PHOSPHATE 10 MG/ML
INJECTION, SOLUTION INTRAMUSCULAR; INTRAVENOUS PRN
Status: DISCONTINUED | OUTPATIENT
Start: 2021-10-18 | End: 2021-10-18 | Stop reason: SDUPTHER

## 2021-10-18 RX ORDER — BUPIVACAINE/KETOROLAC/KETAMINE 150-60/50
SYRINGE (ML) INJECTION PRN
Status: DISCONTINUED | OUTPATIENT
Start: 2021-10-18 | End: 2021-10-18 | Stop reason: HOSPADM

## 2021-10-18 RX ORDER — METOCLOPRAMIDE HYDROCHLORIDE 5 MG/ML
10 INJECTION INTRAMUSCULAR; INTRAVENOUS
Status: DISCONTINUED | OUTPATIENT
Start: 2021-10-18 | End: 2021-10-18 | Stop reason: HOSPADM

## 2021-10-18 RX ORDER — NEOSTIGMINE METHYLSULFATE 1 MG/ML
INJECTION, SOLUTION INTRAVENOUS PRN
Status: DISCONTINUED | OUTPATIENT
Start: 2021-10-18 | End: 2021-10-18 | Stop reason: SDUPTHER

## 2021-10-18 RX ORDER — HYDROCODONE BITARTRATE AND ACETAMINOPHEN 5; 325 MG/1; MG/1
1 TABLET ORAL EVERY 4 HOURS PRN
Status: DISCONTINUED | OUTPATIENT
Start: 2021-10-18 | End: 2021-10-19 | Stop reason: HOSPADM

## 2021-10-18 RX ORDER — LIDOCAINE HYDROCHLORIDE 20 MG/ML
INJECTION, SOLUTION EPIDURAL; INFILTRATION; INTRACAUDAL; PERINEURAL PRN
Status: DISCONTINUED | OUTPATIENT
Start: 2021-10-18 | End: 2021-10-18 | Stop reason: SDUPTHER

## 2021-10-18 RX ORDER — SODIUM CHLORIDE 9 MG/ML
25 INJECTION, SOLUTION INTRAVENOUS PRN
Status: DISCONTINUED | OUTPATIENT
Start: 2021-10-18 | End: 2021-10-19 | Stop reason: HOSPADM

## 2021-10-18 RX ORDER — SODIUM CHLORIDE, SODIUM LACTATE, POTASSIUM CHLORIDE, CALCIUM CHLORIDE 600; 310; 30; 20 MG/100ML; MG/100ML; MG/100ML; MG/100ML
INJECTION, SOLUTION INTRAVENOUS CONTINUOUS
Status: DISCONTINUED | OUTPATIENT
Start: 2021-10-18 | End: 2021-10-18

## 2021-10-18 RX ORDER — ACETAMINOPHEN 325 MG/1
650 TABLET ORAL ONCE
Status: COMPLETED | OUTPATIENT
Start: 2021-10-18 | End: 2021-10-18

## 2021-10-18 RX ORDER — FENTANYL CITRATE 50 UG/ML
25 INJECTION, SOLUTION INTRAMUSCULAR; INTRAVENOUS EVERY 5 MIN PRN
Status: DISCONTINUED | OUTPATIENT
Start: 2021-10-18 | End: 2021-10-18 | Stop reason: HOSPADM

## 2021-10-18 RX ORDER — ATORVASTATIN CALCIUM 10 MG/1
10 TABLET, FILM COATED ORAL DAILY
Status: DISCONTINUED | OUTPATIENT
Start: 2021-10-19 | End: 2021-10-19 | Stop reason: HOSPADM

## 2021-10-18 RX ORDER — CELECOXIB 200 MG/1
400 CAPSULE ORAL ONCE
Status: COMPLETED | OUTPATIENT
Start: 2021-10-18 | End: 2021-10-18

## 2021-10-18 RX ORDER — DEXAMETHASONE SODIUM PHOSPHATE 4 MG/ML
INJECTION, SOLUTION INTRA-ARTICULAR; INTRALESIONAL; INTRAMUSCULAR; INTRAVENOUS; SOFT TISSUE PRN
Status: DISCONTINUED | OUTPATIENT
Start: 2021-10-18 | End: 2021-10-18 | Stop reason: SDUPTHER

## 2021-10-18 RX ORDER — HYDROCODONE BITARTRATE AND ACETAMINOPHEN 5; 325 MG/1; MG/1
2 TABLET ORAL PRN
Status: DISCONTINUED | OUTPATIENT
Start: 2021-10-18 | End: 2021-10-18 | Stop reason: HOSPADM

## 2021-10-18 RX ORDER — GLYCOPYRROLATE 1 MG/5 ML
SYRINGE (ML) INTRAVENOUS PRN
Status: DISCONTINUED | OUTPATIENT
Start: 2021-10-18 | End: 2021-10-18 | Stop reason: SDUPTHER

## 2021-10-18 RX ORDER — ACETAMINOPHEN 325 MG/1
650 TABLET ORAL EVERY 6 HOURS
Status: DISCONTINUED | OUTPATIENT
Start: 2021-10-18 | End: 2021-10-19 | Stop reason: HOSPADM

## 2021-10-18 RX ORDER — TRANEXAMIC ACID 650 1/1
1300 TABLET ORAL ONCE
Status: COMPLETED | OUTPATIENT
Start: 2021-10-18 | End: 2021-10-18

## 2021-10-18 RX ORDER — SODIUM CHLORIDE 0.9 % (FLUSH) 0.9 %
10 SYRINGE (ML) INJECTION EVERY 12 HOURS SCHEDULED
Status: DISCONTINUED | OUTPATIENT
Start: 2021-10-18 | End: 2021-10-19 | Stop reason: HOSPADM

## 2021-10-18 RX ORDER — SODIUM CHLORIDE 0.9 % (FLUSH) 0.9 %
10 SYRINGE (ML) INJECTION PRN
Status: DISCONTINUED | OUTPATIENT
Start: 2021-10-18 | End: 2021-10-19 | Stop reason: HOSPADM

## 2021-10-18 RX ORDER — BUPIVACAINE/KETOROLAC/KETAMINE 150-60/50
SYRINGE (ML) INJECTION
Status: DISCONTINUED
Start: 2021-10-18 | End: 2021-10-18

## 2021-10-18 RX ORDER — KETOROLAC TROMETHAMINE 30 MG/ML
INJECTION, SOLUTION INTRAMUSCULAR; INTRAVENOUS PRN
Status: DISCONTINUED | OUTPATIENT
Start: 2021-10-18 | End: 2021-10-18 | Stop reason: SDUPTHER

## 2021-10-18 RX ORDER — LISINOPRIL 20 MG/1
20 TABLET ORAL DAILY
Status: DISCONTINUED | OUTPATIENT
Start: 2021-10-19 | End: 2021-10-19 | Stop reason: HOSPADM

## 2021-10-18 RX ORDER — SENNA AND DOCUSATE SODIUM 50; 8.6 MG/1; MG/1
1 TABLET, FILM COATED ORAL 2 TIMES DAILY
Status: DISCONTINUED | OUTPATIENT
Start: 2021-10-18 | End: 2021-10-19 | Stop reason: HOSPADM

## 2021-10-18 RX ORDER — PROPOFOL 10 MG/ML
INJECTION, EMULSION INTRAVENOUS PRN
Status: DISCONTINUED | OUTPATIENT
Start: 2021-10-18 | End: 2021-10-18 | Stop reason: SDUPTHER

## 2021-10-18 RX ADMIN — LIDOCAINE HYDROCHLORIDE 80 MG: 20 INJECTION, SOLUTION EPIDURAL; INFILTRATION; INTRACAUDAL; PERINEURAL at 13:14

## 2021-10-18 RX ADMIN — ACETAMINOPHEN 650 MG: 325 TABLET ORAL at 11:13

## 2021-10-18 RX ADMIN — ROPIVACAINE HYDROCHLORIDE 20 MG: 5 INJECTION, SOLUTION EPIDURAL; INFILTRATION; PERINEURAL at 12:52

## 2021-10-18 RX ADMIN — DOCUSATE SODIUM 50 MG AND SENNOSIDES 8.6 MG 1 TABLET: 8.6; 5 TABLET, FILM COATED ORAL at 20:50

## 2021-10-18 RX ADMIN — CEFAZOLIN SODIUM 2000 MG: 10 INJECTION, POWDER, FOR SOLUTION INTRAVENOUS at 13:04

## 2021-10-18 RX ADMIN — DEXAMETHASONE SODIUM PHOSPHATE 4 MG: 4 INJECTION, SOLUTION INTRAMUSCULAR; INTRAVENOUS at 13:14

## 2021-10-18 RX ADMIN — TRANEXAMIC ACID 1300 MG: 650 TABLET ORAL at 11:13

## 2021-10-18 RX ADMIN — PROPOFOL 150 MG: 10 INJECTION, EMULSION INTRAVENOUS at 13:14

## 2021-10-18 RX ADMIN — SODIUM CHLORIDE, POTASSIUM CHLORIDE, SODIUM LACTATE AND CALCIUM CHLORIDE: 600; 310; 30; 20 INJECTION, SOLUTION INTRAVENOUS at 17:21

## 2021-10-18 RX ADMIN — ACETAMINOPHEN 650 MG: 325 TABLET ORAL at 23:21

## 2021-10-18 RX ADMIN — PHENYLEPHRINE HYDROCHLORIDE 100 MCG: 10 INJECTION INTRAVENOUS at 14:26

## 2021-10-18 RX ADMIN — DEXAMETHASONE SODIUM PHOSPHATE 10 MG: 10 INJECTION, SOLUTION INTRAMUSCULAR; INTRAVENOUS at 12:52

## 2021-10-18 RX ADMIN — CEFAZOLIN 1000 MG: 1 INJECTION, POWDER, FOR SOLUTION INTRAMUSCULAR; INTRAVENOUS; PARENTERAL at 15:03

## 2021-10-18 RX ADMIN — PHENYLEPHRINE HYDROCHLORIDE 100 MCG: 10 INJECTION INTRAVENOUS at 14:43

## 2021-10-18 RX ADMIN — PHENYLEPHRINE HYDROCHLORIDE 100 MCG: 10 INJECTION INTRAVENOUS at 14:13

## 2021-10-18 RX ADMIN — TRANEXAMIC ACID 1300 MG: 650 TABLET ORAL at 17:22

## 2021-10-18 RX ADMIN — KETOROLAC TROMETHAMINE 15 MG: 30 INJECTION, SOLUTION INTRAMUSCULAR; INTRAVENOUS at 15:16

## 2021-10-18 RX ADMIN — PHENYLEPHRINE HYDROCHLORIDE 100 MCG: 10 INJECTION INTRAVENOUS at 13:40

## 2021-10-18 RX ADMIN — FENTANYL CITRATE 50 MCG: 50 INJECTION, SOLUTION INTRAMUSCULAR; INTRAVENOUS at 12:46

## 2021-10-18 RX ADMIN — Medication 0.4 MG: at 15:16

## 2021-10-18 RX ADMIN — PHENYLEPHRINE HYDROCHLORIDE 100 MCG: 10 INJECTION INTRAVENOUS at 13:50

## 2021-10-18 RX ADMIN — PHENYLEPHRINE HYDROCHLORIDE 100 MCG: 10 INJECTION INTRAVENOUS at 14:00

## 2021-10-18 RX ADMIN — PHENYLEPHRINE HYDROCHLORIDE 100 MCG: 10 INJECTION INTRAVENOUS at 13:30

## 2021-10-18 RX ADMIN — CELECOXIB 400 MG: 200 CAPSULE ORAL at 11:13

## 2021-10-18 RX ADMIN — PHENYLEPHRINE HYDROCHLORIDE 100 MCG: 10 INJECTION INTRAVENOUS at 14:38

## 2021-10-18 RX ADMIN — ONDANSETRON 4 MG: 2 INJECTION INTRAMUSCULAR; INTRAVENOUS at 15:16

## 2021-10-18 RX ADMIN — CEFAZOLIN 2000 MG: 10 INJECTION, POWDER, FOR SOLUTION INTRAVENOUS at 23:22

## 2021-10-18 RX ADMIN — SODIUM CHLORIDE, POTASSIUM CHLORIDE, SODIUM LACTATE AND CALCIUM CHLORIDE: 600; 310; 30; 20 INJECTION, SOLUTION INTRAVENOUS at 11:25

## 2021-10-18 RX ADMIN — Medication 2 MG: at 15:16

## 2021-10-18 RX ADMIN — FENTANYL CITRATE 50 MCG: 50 INJECTION, SOLUTION INTRAMUSCULAR; INTRAVENOUS at 12:44

## 2021-10-18 ASSESSMENT — LIFESTYLE VARIABLES: SMOKING_STATUS: 0

## 2021-10-18 ASSESSMENT — PAIN DESCRIPTION - DESCRIPTORS: DESCRIPTORS: ACHING;CONSTANT;THROBBING

## 2021-10-18 ASSESSMENT — PAIN SCALES - GENERAL
PAINLEVEL_OUTOF10: 0
PAINLEVEL_OUTOF10: 6
PAINLEVEL_OUTOF10: 0

## 2021-10-18 ASSESSMENT — PAIN - FUNCTIONAL ASSESSMENT: PAIN_FUNCTIONAL_ASSESSMENT: 0-10

## 2021-10-18 NOTE — BRIEF OP NOTE
Brief Postoperative Note      Patient: Jose Cardoso  YOB: 1945   MRN: [id number]    Date of Procedure: 10/18/2021    Pre-Op Diagnosis: COMPLETE TEAR OF ROTATOR CUFF-RIGHT    Post-Op Diagnosis: Same       Procedure(s):  SHOULDER TOTAL ARTHROPLASTY-REVERSE    Surgeon(s):  Criselda Taylor MD    Assistant:  First Assistant: Miranda Nguyen    Anesthesia: General    Estimated Blood Loss (mL): 449     Complications: None    Specimens:   * No specimens in log *    Implants:  Implant Name Type Inv. Item Serial No.  Lot No. LRB No. Used Action   BASEPLATE ANGELITA [id number] MINI SHLDR REV TAPR COMPHSVE  BASEPLATE ANGELITA [id number] MINI SHLDR REV TAPR [facility]- [id number] Right 1 Implanted   SCREW BNE L20MM DIA6. 5MM HEX HD DIA3. [facility] CONV  SCREW BNE L20MM DIA6. 5MM HEX HD DIA3. 5MM FOR COMPHSVE CONV  [employer]- [id number] Right 1 Implanted   SCREW BNE L30MM DIA4. 75MM HD DIA3.5MM SRINIVASA FIX ANG  SCREW BNE L30MM DIA4. 75MM HD DIA3.5MM SRINIVASA FIX ANG  [employer]- [id number] Right 1 Implanted   SCREW BNE L35MM DIA4. 75MM HD DIA3.5MM SRINIVASA FIX ANG  SCREW BNE L35MM DIA4. 75MM HD DIA3.5MM SRINIVASA FIX ANG  [employer]- [id number] Right 1 Implanted   SPHERE ANGELITA [id number] REG STD CO CHROM TAPR FIT FOR COMPHSVE  SPHERE ANGELITA [id number] REG STD CO CHROM TAPR FIT FOR COMPHSVE  [employer]- [id number] Right 1 Implanted   STEM HUM 10MM MINI SHLDR CO CHROM COMPHSVE REV RENATA HEIDY  STEM HUM 10MM MINI SHLDR CO CHROM COMPHSVE REV RENATA HEIDY  [employer]- [id number] Right 1 Implanted   BEARING HUM STD 36 MM SHLDR VIVACIT-E PROLONG COMPHSVE  BEARING HUM STD 36 MM SHLDR VIVACIT-E PROLONG COMPHSVE  [employer] CAPO- [id number] Right 1 Implanted   TRAY HUM STD FOR COMPHSVE REV SHLDR SYS  TRAY HUM STD FOR COMPHSVE REV SHLDR SYS  [facility]-WD [id number] Right 1 Implanted         Drains: 1  Closed/Suction Drain Right; Anterior Shoulder Other (Comment) [facility] (Active)       Findings:       Electronically signed by Nikolay Pickard MD on 10/18/2021 at 3:29 PM

## 2021-10-18 NOTE — PROGRESS NOTES
Transported pt by bed to [facility], report given to Bette Woodson RN. Discharge Criteria    Inpatients must meet Criteria 1 through 7. All other patients are either YES or N/A. If a NO is chosen then Anesthesia or Surgeon must be notified. 1.  Minimum 30 minutes after last dose of sedative medication, minimum 120 minutes after last dose of reversal agent. Yes      2. Systolic BP stable within 20 mmHg for 30 minutes & systolic BP between 90 & 798 or within 10 mmHg of baseline. Yes      3. Pulse between 60 and 100 or within 10 bpm of baseline. Yes      4. Spontaneous respiratory rate >/= 10 per minute. Yes      5. SaO2 >/= 95 or  >/= baseline. Yes      6. Able to cough and swallow or return to baseline function. Yes      7. Alert and oriented or return to baseline mental status.     Yes

## 2021-10-18 NOTE — PROGRESS NOTES
Occupational Therapy   Occupational Therapy Initial Assessment  Date: 10/18/2021   Patient Name: Lelia Bolden  MRN: [id number]     : 1945    Date of Service: 10/18/2021    Discharge Recommendations:  Continue to assess pending progress  OT Equipment Recommendations  Equipment Needed: No    Assessment   Performance deficits / Impairments: Decreased ADL status; Decreased ROM  Assessment: Pt is a 64 y.o female admitted for observation S/P a reverse total shoulder replacement. Pt demo decreased ROM and decreased participation in ADL's. Pt would benefit from skilled OT services to address deficits and be provided with education to [facility] safe healing and independence. Treatment Diagnosis: generalized weakness  Prognosis: Fair  Decision Making: Low Complexity  OT Education: OT Role;Plan of Care  Barriers to Learning: none  REQUIRES OT FOLLOW UP: Yes  Activity Tolerance  Activity Tolerance: Patient Tolerated treatment well  Safety Devices  Safety Devices in place: Yes  Type of devices: All fall risk precautions in place; Left in bed;Call light within reach  Restraints  Initially in place: No           Patient Diagnosis(es): There were no encounter diagnoses. has a past medical history of Arthritis, Bell's palsy, Hyperlipidemia, and Seasonal allergies. has a past surgical history that includes  section; Colonoscopy (); Appendectomy; other surgical history (Right, 2016); other surgical history (Right, 2016); other surgical history (2017); Nerve Block Lumb Facet Level 1 Bilateral (Right, 2017); Humerus Closed Reduction (Right, 2021); Humerus Closed Reduction (Right, 2021); back surgery; Pain management procedure (2020); and Total shoulder arthroplasty (Right, 10/18/2021).     Treatment Diagnosis: generalized weakness      Restrictions       Subjective   General  Chart Reviewed: Yes  Patient assessed for rehabilitation services?: Yes  Family / Caregiver Present: No  Referring Practitioner: Mandie Medina  Diagnosis: Status post reverse total shoulder replacement. Subjective  Subjective: Pt states she does not currently have pain.   General Comment  Comments: Pt supine in bed with HOB elevated upon arrival.  Patient Currently in Pain: Denies  Pain Assessment  Pain Assessment: 0-10  Pain Level: 0  Vital Signs  Temp: 97.2 °F (36.2 °C)  Temp Source: Temporal  Pulse: 85  Heart Rate Source: Monitor  Resp: 18  BP: 132/78  BP Location: Left upper arm  MAP (mmHg): 98  Patient Position: Semi fowlers  Level of Consciousness: Alert (0)  MEWS Score: 1  Patient Currently in Pain: Denies  Oxygen Therapy  SpO2: 94 %  Pulse Oximeter Device Mode: Continuous  Pulse Oximeter Device Location: Finger  O2 Device: None (Room air)  Social/Functional History  Social/Functional History  Lives With: Other (comment), Alone ([employer])  Type of Home: Assisted living  Home Layout: One level  Home Access: Elevator  Bathroom Shower/Tub: Tub/Shower unit, Shower chair with back  Bathroom Toilet: Standard  Bathroom Equipment: Grab bars in shower, Grab bars around toilet, Shower chair  Home Equipment: [employer]  ADL Assistance: Independent  Homemaking Assistance: Independent  Homemaking Responsibilities: Yes  Ambulation Assistance: Independent  Transfer Assistance: Independent  Active : Yes  Mode of Transportation: Car  Occupation: Retired       Objective   Vision: Impaired  Vision Exceptions: Wears glasses at all times  Hearing: Within functional limits          Balance  Sitting Balance: Independent  Standing Balance: Stand by assistance  ADL  Grooming: Minimal assistance  UE Bathing: Minimal assistance  LE Bathing: Minimal assistance  UE Dressing: Minimal assistance  LE Dressing: Minimal assistance  Toileting: Minimal assistance        Bed mobility  Rolling to Left: Supervision  Supine to Sit: Supervision  Sit to Supine: Supervision  Scooting: Supervision  Transfers  Sit to stand: Supervision  Stand to sit:

## 2021-10-18 NOTE — ANESTHESIA PROCEDURE NOTES
Peripheral Block    Patient location during procedure: pre-op  Start time: 10/18/2021 12:46 PM  End time: 10/18/2021 12:52 PM  Staffing  Performed: resident/CRNA   Resident/CRNA: PRAKASH Isaacs CRNA  Preanesthetic Checklist  Completed: patient identified, IV checked, site marked, risks and benefits discussed, surgical consent, monitors and equipment checked, pre-op evaluation, timeout performed, anesthesia consent given, oxygen available and patient being monitored  Peripheral Block  Patient position: supine  Prep: ChloraPrep  Patient monitoring: continuous pulse ox, frequent blood pressure checks and IV access  Block type: Brachial plexus  Laterality: right  Injection technique: single-shot  Guidance: nerve stimulator and ultrasound guided  Local infiltration: ropivacaine and decadron  Infiltration strength: 0.5 %  Dose: 20 mL  Interscalene  Provider prep: mask and sterile gloves  Local infiltration: ropivacaine and decadron  Needle  Needle type:  Other   Needle gauge: 25 G  Needle localization: anatomical landmarks, ultrasound guidance and nerve stimulatorOther needle type: [place]  Assessment  Injection assessment: negative aspiration for heme, no paresthesia on injection and local visualized surrounding nerve on ultrasound  Paresthesia pain: none  Slow fractionated injection: yes  Hemodynamics: stable  Additional Notes  No twitch noted at 0.2 mA  Reason for block: post-op pain management and at surgeon's request

## 2021-10-18 NOTE — ANESTHESIA PRE PROCEDURE
Department of Anesthesiology  Preprocedure Note       Name:  Michael Rosenberg   Age:  64 y.o.  :  1945                                          MRN:  [id number]         Date:  10/18/2021      Surgeon: Sindy Leslie):  Vu Guerra MD    Procedure: Procedure(s):  SHOULDER TOTAL ARTHROPLASTY-REVERSE    Medications prior to admission:   Prior to Admission medications    Medication Sig Start Date End Date Taking? Authorizing Provider   Acetaminophen (TYLENOL 8 HOUR ARTHRITIS PAIN PO) Take by mouth   Yes Historical Provider, MD   lisinopril (PRINIVIL;ZESTRIL) 20 MG tablet Take 20 mg by mouth daily   Yes Historical Provider, MD   DULoxetine (CYMBALTA) 60 MG extended release capsule Take 60 mg by mouth daily   Yes Historical Provider, MD   simvastatin (ZOCOR) 20 MG tablet Take 20 mg by mouth nightly   Yes Historical Provider, MD   Multiple Vitamins-Minerals (MULTIVITAMIN & MINERAL PO) Take by mouth   Yes Historical Provider, MD       Current medications:    Current Facility-Administered Medications   Medication Dose Route Frequency Provider Last Rate Last Admin    ceFAZolin (ANCEF) 1,000 mg in dextrose 5 % 50 mL IVPB (mini-bag)  1,000 mg IntraVENous Once Vu Guerra MD        ceFAZolin (ANCEF) 2000 mg in dextrose 5 % 100 mL IVPB  2,000 mg IntraVENous Once Vu Guerra MD        lactated ringers infusion   IntraVENous Continuous Vu Guerra  mL/hr at 10/18/21 1125 New Bag at 10/18/21 1125       Allergies:     Allergies   Allergen Reactions    Ciprofloxacin Swelling       Problem List:    Patient Active Problem List   Diagnosis Code    Lumbar post-laminectomy syndrome M96.1    Sacroiliitis, not elsewhere classified ([facility] [address] M46.1    Lumbosacral spondylosis without myelopathy M47.817    Shoulder joint dislocation S43.006A    Shoulder dislocation, right, initial encounter Y02.969I    Hypokalemia E87.6    Hyponatremia E87.1    Acute hyponatremia E87.1       Past Medical History:        Diagnosis Date    Arthritis     Bell's palsy     Hyperlipidemia     Seasonal allergies        Past Surgical History:        Procedure Laterality Date    APPENDECTOMY      BACK SURGERY       SECTION      COLONOSCOPY      HUMERUS CLOSED REDUCTION Right 2021    HUMERUS CLOSED REDUCTION Right 2021    HUMERUS CLOSED REDUCTION performed by Rupert Hendrix MD at [address] [facility] FACET LEVEL 1 BILATERAL Right 2017    LUMBAR FACET L-4-5,L5-S1 performed by Chris Dawson MD at [address] Right 2016    SI pain injection- Dr. Temo Hernandez Right 2016    S1 RFA    OTHER SURGICAL HISTORY  2017    pain injection    PAIN MANAGEMENT PROCEDURE  2020    Pain/nerve stimulator       Social History:    Social History     Tobacco Use    Smoking status: Never Smoker    Smokeless tobacco: Never Used   Substance Use Topics    Alcohol use: Yes     Alcohol/week: 10.0 standard drinks     Types: 10 Cans of beer per week                                Counseling given: Not Answered      Vital Signs (Current):   Vitals:    10/18/21 1104   BP: 132/76   Pulse: 84   Resp: 16   Temp: 36.6 °C (97.9 °F)   TempSrc: Temporal   SpO2: 95%   Weight: 133 lb 3.2 oz (60.4 kg)   Height: 5' (1.524 m)                                              BP Readings from Last 3 Encounters:   10/18/21 132/76   10/04/21 139/78   21 116/66       NPO Status: Time of last liquid consumption:                         Time of last solid consumption:                         Date of last liquid consumption: 10/17/21                        Date of last solid food consumption: 10/17/21    BMI:   Wt Readings from Last 3 Encounters:   10/18/21 133 lb 3.2 oz (60.4 kg)   10/04/21 136 lb 3.2 oz (61.8 kg)   21 141 lb 3.2 oz (64 kg)     Body mass index is 26.01 kg/m².     CBC:   Lab Results   Component Value Date    WBC 5.1 10/04/2021    RBC 3.93 10/04/2021    HGB PNB)  Induction: intravenous. MIPS: Postoperative opioids intended and Prophylactic antiemetics administered. Anesthetic plan and risks discussed with patient and child/children. Plan discussed with CRNA.                   PRAKASH García - CRNA   10/18/2021

## 2021-10-18 NOTE — CONSULTS
Hospitalist Consult Note      Requesting Physician:  Dr. Navi Gutierrez    Reason for consultation: Total shoulder replacement    SUBJECTIVE:    History of Present Illness: The patient is a 64 y.o. female who underwent an elective right total shoulder replacement by Dr. Navi Gutierrez for degenerative arthritis and pain which was uncontrolled with outpatient conservative management. Post-op course thus far has been uncomplicated. Her pain is well controlled post operatively. She denies nausea and vomiting post op. She denies any chest pain, palpitations or shortness of breath. Past Medical History:        Diagnosis Date    Arthritis     Bell's palsy     Hyperlipidemia     Seasonal allergies        Past Surgical History:        Procedure Laterality Date    APPENDECTOMY      BACK SURGERY       SECTION      COLONOSCOPY      HUMERUS CLOSED REDUCTION Right 2021    HUMERUS CLOSED REDUCTION Right 2021    HUMERUS CLOSED REDUCTION performed by Criselda Taylor MD at [address] [place] [address] FACET LEVEL 1 BILATERAL Right 2017    LUMBAR FACET L-4-5,L5-S1 performed by Markell Watson MD at [place] [address] Right 2016    SI pain injection- Dr. Tracy Dias Right 2016    S1 RFA    OTHER SURGICAL HISTORY  2017    pain injection    PAIN MANAGEMENT PROCEDURE  2020    Pain/nerve stimulator    SHOULDER ARTHROPLASTY Right 10/18/2021    Dr. Navi Gutierrez - [facility] - [facility]       Medications Prior to Admission:    Prior to Admission medications    Medication Sig Start Date End Date Taking?  Authorizing Provider   Acetaminophen (TYLENOL 8 HOUR ARTHRITIS PAIN PO) Take by mouth   Yes Historical Provider, MD   lisinopril (PRINIVIL;ZESTRIL) 20 MG tablet Take 20 mg by mouth daily   Yes Historical Provider, MD   DULoxetine (CYMBALTA) 60 MG extended release capsule Take 60 mg by mouth daily   Yes Historical Provider, MD   simvastatin (ZOCOR) 20 MG tablet Take 20 mg by mouth nightly   Yes Historical Provider, MD   Multiple Vitamins-Minerals (MULTIVITAMIN & MINERAL PO) Take by mouth   Yes Historical Provider, MD       Allergies:  Ciprofloxacin    Social History:   TOBACCO:   reports that she has never smoked. She has never used smokeless tobacco.  ETOH:   reports current alcohol use of about 10.0 standard drinks of alcohol per week. Family History:   History reviewed. No pertinent family history. OBJECTIVE:    Vitals:  Temp: 96.3 °F (35.7 °C)  BP: (!) 148/78  Resp: 18  Pulse: 80  SpO2: 93 %  24HR INTAKE/OUTPUT:      Intake/Output Summary (Last 24 hours) at 10/18/2021 1654  Last data filed at 10/18/2021 1600  Gross per 24 hour   Intake 750 ml   Output 250 ml   Net 500 ml     -----------------------------------------------------------------    Review of Systems:  Constitutional:negative  for fevers, and negative for chills. Eyes: negative for visual disturbance   ENT: negative for sore throat, negative nasal congestion, and negative for earache  Respiratory: negative for shortness of breath, negative for cough, and negative for wheezing  Cardiovascular: negative for chest pain, negative for palpitations, and negative for syncope  Gastrointestinal: negative for abdominal pain, negative for nausea,negative for vomiting, negative for diarrhea, negative for constipation, and negative for hematochezia or melena  Genitourinary: negative for dysuria, negative for urinary urgency, negative for urinary frequency, and negative for hematuria  Skin: negative for skin rash, and negative for skin lesions  Neurological: negative for unilateral weakness, numbness or tingling. Exam:  GEN:    Awake, alert and oriented x 3. no acute distress  NECK:  Supple. normal  CVS:    RRR, no murmur, rub or gallop  PULM:  CTA, no wheezes, rales or rhonchi  ABD:    Bowels sounds normal.  Abdomen is soft. No distention. No tenderness. EXT:   no  edema.   Pedal pulses are intact. No calf tenderness  NEURO: Motor and sensory are intact  SKIN:  Incision is clean, dry and intact. No surrounding erythema.  -----------------------------------------------------------------  Diagnostic Data:    Lab Results   Component Value Date    WBC 5.1 10/04/2021    HGB 12.4 10/04/2021    MCV 99.0 10/04/2021     10/04/2021      Lab Results   Component Value Date    GLUCOSE 110 (H) 10/04/2021    BUN 21 10/04/2021    CREATININE 0.82 10/04/2021     (L) 10/04/2021    K 4.5 10/04/2021    CALCIUM 9.2 10/04/2021    CL 97 (L) 10/04/2021    CO2 22 10/04/2021         ASSESSMENT:      · Post-op medical management of:        Principal Problem:    Status post reverse total shoulder replacement  Resolved Problems:    * No resolved hospital problems.  *    · s/p right total shoulder arthroplasty    PLAN:  · Continue current physical and occupational therapy  · Continue DVT prophylaxisLovenox  · Discharge planninghome with outpatient therapy    PRAKASH Gill - CNP, PRAKASH, NP-C  10/18/2021, 4:54 PM

## 2021-10-19 VITALS
BODY MASS INDEX: 26.31 KG/M2 | SYSTOLIC BLOOD PRESSURE: 120 MMHG | WEIGHT: 134 LBS | HEART RATE: 95 BPM | OXYGEN SATURATION: 95 % | HEIGHT: 60 IN | RESPIRATION RATE: 18 BRPM | TEMPERATURE: 97.6 F | DIASTOLIC BLOOD PRESSURE: 65 MMHG

## 2021-10-19 LAB
HCT VFR BLD CALC: 31.4 % (ref 36.3–47.1)
HEMOGLOBIN: 10.2 G/DL (ref 11.9–15.1)
MCH RBC QN AUTO: 30.8 PG (ref 25.2–33.5)
MCHC RBC AUTO-ENTMCNC: 32.5 G/DL (ref 28.4–34.8)
MCV RBC AUTO: 94.9 FL (ref 82.6–102.9)
NRBC AUTOMATED: 0 PER 100 WBC
PDW BLD-RTO: 12 % (ref 11.8–14.4)
PLATELET # BLD: 206 K/UL (ref 138–453)
PMV BLD AUTO: 9.1 FL (ref 8.1–13.5)
RBC # BLD: 3.31 M/UL (ref 3.95–5.11)
WBC # BLD: 5.7 K/UL (ref 3.5–11.3)

## 2021-10-19 PROCEDURE — 6360000002 HC RX W HCPCS: Performed by: ORTHOPAEDIC SURGERY

## 2021-10-19 PROCEDURE — G0378 HOSPITAL OBSERVATION PER HR: HCPCS

## 2021-10-19 PROCEDURE — 6370000000 HC RX 637 (ALT 250 FOR IP): Performed by: ORTHOPAEDIC SURGERY

## 2021-10-19 PROCEDURE — 85027 COMPLETE CBC AUTOMATED: CPT

## 2021-10-19 PROCEDURE — 36415 COLL VENOUS BLD VENIPUNCTURE: CPT

## 2021-10-19 PROCEDURE — 94761 N-INVAS EAR/PLS OXIMETRY MLT: CPT

## 2021-10-19 PROCEDURE — 2580000003 HC RX 258: Performed by: ORTHOPAEDIC SURGERY

## 2021-10-19 PROCEDURE — 97116 GAIT TRAINING THERAPY: CPT

## 2021-10-19 PROCEDURE — 97110 THERAPEUTIC EXERCISES: CPT

## 2021-10-19 PROCEDURE — 97162 PT EVAL MOD COMPLEX 30 MIN: CPT

## 2021-10-19 PROCEDURE — 96372 THER/PROPH/DIAG INJ SC/IM: CPT

## 2021-10-19 PROCEDURE — 97535 SELF CARE MNGMENT TRAINING: CPT

## 2021-10-19 RX ORDER — HYDROCODONE BITARTRATE AND ACETAMINOPHEN 5; 325 MG/1; MG/1
1-2 TABLET ORAL EVERY 6 HOURS PRN
Qty: 30 TABLET | Refills: 0 | Status: SHIPPED | OUTPATIENT
Start: 2021-10-19 | End: 2021-10-26

## 2021-10-19 RX ADMIN — ATORVASTATIN CALCIUM 10 MG: 10 TABLET, FILM COATED ORAL at 09:17

## 2021-10-19 RX ADMIN — SODIUM CHLORIDE, PRESERVATIVE FREE 10 ML: 5 INJECTION INTRAVENOUS at 09:18

## 2021-10-19 RX ADMIN — ENOXAPARIN SODIUM 40 MG: 40 INJECTION SUBCUTANEOUS at 09:16

## 2021-10-19 RX ADMIN — MAGNESIUM HYDROXIDE 30 ML: 400 SUSPENSION ORAL at 11:00

## 2021-10-19 RX ADMIN — DULOXETINE HYDROCHLORIDE 60 MG: 60 CAPSULE, DELAYED RELEASE ORAL at 09:17

## 2021-10-19 RX ADMIN — CEFAZOLIN 2000 MG: 10 INJECTION, POWDER, FOR SOLUTION INTRAVENOUS at 09:11

## 2021-10-19 RX ADMIN — LISINOPRIL 20 MG: 20 TABLET ORAL at 09:17

## 2021-10-19 RX ADMIN — HYDROCODONE BITARTRATE AND ACETAMINOPHEN 1 TABLET: 5; 325 TABLET ORAL at 11:00

## 2021-10-19 RX ADMIN — DOCUSATE SODIUM 50 MG AND SENNOSIDES 8.6 MG 1 TABLET: 8.6; 5 TABLET, FILM COATED ORAL at 09:17

## 2021-10-19 RX ADMIN — ACETAMINOPHEN 650 MG: 325 TABLET ORAL at 05:00

## 2021-10-19 RX ADMIN — ACETAMINOPHEN 650 MG: 325 TABLET ORAL at 11:00

## 2021-10-19 ASSESSMENT — PAIN SCALES - GENERAL
PAINLEVEL_OUTOF10: 0
PAINLEVEL_OUTOF10: 4
PAINLEVEL_OUTOF10: 2

## 2021-10-19 ASSESSMENT — PAIN DESCRIPTION - LOCATION: LOCATION: SHOULDER

## 2021-10-19 ASSESSMENT — PAIN - FUNCTIONAL ASSESSMENT: PAIN_FUNCTIONAL_ASSESSMENT: ACTIVITIES ARE NOT PREVENTED

## 2021-10-19 ASSESSMENT — PAIN DESCRIPTION - DIRECTION: RADIATING_TOWARDS: RIGHT SHOULDER AND NECK

## 2021-10-19 ASSESSMENT — PAIN DESCRIPTION - ONSET: ONSET: SUDDEN

## 2021-10-19 ASSESSMENT — PAIN DESCRIPTION - ORIENTATION: ORIENTATION: RIGHT

## 2021-10-19 ASSESSMENT — PAIN DESCRIPTION - FREQUENCY: FREQUENCY: CONTINUOUS

## 2021-10-19 ASSESSMENT — PAIN DESCRIPTION - PROGRESSION: CLINICAL_PROGRESSION: NOT CHANGED

## 2021-10-19 ASSESSMENT — PAIN DESCRIPTION - PAIN TYPE: TYPE: ACUTE PAIN;SURGICAL PAIN

## 2021-10-19 ASSESSMENT — PAIN DESCRIPTION - DESCRIPTORS: DESCRIPTORS: ACHING;SHOOTING;SHARP;DISCOMFORT

## 2021-10-19 NOTE — PROGRESS NOTES
Physical Therapy    Facility/Department: [facility] AT THE [facility] MED SURG  Initial Assessment    NAME: Veena Harris  : 1945  MRN: [id number]    Date of Service: 10/19/2021    Discharge Recommendations:  Continue to assess pending progress, Home with Home health PT, Outpatient PT   PT Equipment Recommendations  Equipment Needed: No    Assessment   Body structures, Functions, Activity limitations: Decreased functional mobility ; Decreased balance;Decreased ADL status; Decreased ROM; Decreased endurance;Decreased strength;Decreased posture;Decreased high-level IADLs  Assessment: The patient is a 64 y.o. female who was admitted s/p R reverse total shoulder. She was able to ambulate safely in room and demonstrates fair to fair+ balance. She would benefit from skilled PT to address her balance, endurance deficits to improve functional mobility. Patient was carrying drain as she ambulated and when she sat down, the drain fell out of the bandage. RN was called and patient was bandaged without drain. Treatment Diagnosis: R reverse total shoulder, generalized weakness  Prognosis: Good  Decision Making: Medium Complexity  REQUIRES PT FOLLOW UP: Yes  Activity Tolerance  Activity Tolerance: Patient Tolerated treatment well       Patient Diagnosis(es): The encounter diagnosis was Status post reverse total replacement of right shoulder. has a past medical history of Arthritis, Bell's palsy, Hyperlipidemia, and Seasonal allergies. has a past surgical history that includes  section; Colonoscopy (); Appendectomy; other surgical history (Right, 2016); other surgical history (Right, 2016); other surgical history (2017); Nerve Block Lumb Facet Level 1 Bilateral (Right, 2017); Humerus Closed Reduction (Right, 2021);  Humerus Closed Reduction (Right, 2021); back surgery; Pain management procedure (2020); and Total shoulder arthroplasty (Right, 10/18/2021). Restrictions  Restrictions/Precautions  Restrictions/Precautions: Fall Risk, General Precautions  Vision/Hearing  Vision: Impaired  Vision Exceptions: Wears glasses at all times  Hearing: Within functional limits     Subjective  General  Chart Reviewed: Yes  Patient assessed for rehabilitation services?: Yes  Family / Caregiver Present: No  Referring Practitioner: Radha Giron MD  Referral Date : 10/18/21  Diagnosis: s/p reverse total shoulder replacement, Z96.619  Follows Commands: Within Functional Limits  Subjective  Subjective: Patient denies pain at this time. She reports her shoulder is still numb at this time.   Pain Screening  Patient Currently in Pain: Denies          Orientation  Orientation  Overall Orientation Status: Within Functional Limits  Social/Functional History  Social/Functional History  Lives With: Other (comment), Alone  Type of Home: Assisted living  Home Layout: One level  Home Access: Elevator  Bathroom Shower/Tub: Tub/Shower unit, Shower chair with back  Bathroom Toilet: Standard  Bathroom Equipment: Grab bars in shower, Grab bars around toilet, Shower chair  Home Equipment: Dolosys  ADL Assistance: Independent  Homemaking Assistance: Independent  Homemaking Responsibilities: Yes  Ambulation Assistance: Independent  Transfer Assistance: Independent  Active : Yes  Mode of Transportation: Car  Occupation: Retired  Cognition   Cognition  Overall Cognitive Status: WFL    Objective    AROM RLE (degrees)  RLE AROM: WFL  AROM LLE (degrees)  LLE AROM : WFL  Strength RLE  Strength RLE: WFL  Strength LLE  Strength LLE: WFL     Sensation  Overall Sensation Status: WFL  Bed mobility  Rolling to Left: Supervision  Supine to Sit: Supervision  Sit to Supine: Supervision  Scooting: Supervision  Transfers  Sit to Stand: Contact guard assistance  Stand to sit: Contact guard assistance  Ambulation  Ambulation?: Yes  WB Status: FWB  Ambulation 1  Surface: level tile  Device: No Device  Assistance: Contact guard assistance  Quality of Gait: Pt ambulates with forward flexed posture  Distance: 15'  Stairs/Curb  Stairs?: No     Balance  Posture: Fair  Sitting - Static: Good  Sitting - Dynamic: Good  Standing - Static: Fair  Standing - Dynamic: [address]  Times per week: 7  Times per day: Twice a day  Plan Comment: 1x/day on weekends  Safety Devices  Type of devices:  All fall risk precautions in place    AM-PAC Score  AM-PAC Inpatient Mobility without Stair Climbing Raw Score : 15 (10/19/21 1240)  AM-PAC Inpatient without Stair Climbing T-Scale Score : 43.03 (10/19/21 1240)  Mobility Inpatient CMS 0-100% Score: 47.43 (10/19/21 1240)  Mobility Inpatient without Stair CMS G-Code Modifier : CK (10/19/21 1240)    Goals  Short term goals  Time Frame for Short term goals: 10 days  Short term goal 1: Patient will ambulate 48' with supervision without LOB  Short term goal 2: Patient will perform transfers and bed mobility with MI  Short term goal 3: Patient will tolerate 20-30 minutes of therex/act to improve endurance for ADLs  Patient Goals   Patient goals : Return home       Therapy Time   Individual Concurrent Group Co-treatment   Time In 0815         Time Out 0840         Minutes 25                 Carmen Bhakta, PT, DPT

## 2021-10-19 NOTE — DISCHARGE SUMMARY
Physician Discharge Summary     Patient ID:  Tino Gabriel  [id number]  1945    Admit date: 10/18/2021    Discharge date and time:      Admitting Physician: Kalie Calhoun MD     Primary Care Physician: Cooper Cruz MD    Primary Discharge Diagnoses: DJD right Shoulder     Additional Diagnoses:       Diagnosis Date    Arthritis     Bell's palsy     Hyperlipidemia     Seasonal allergies        Hospital Course: The patient was admitted for the above. She was treated with surgery and improved over the course of her hospitalization. Consults: none    Procedures: right Shoulder total replacement    Complications: none    Discharge Condition: good    Post op anemia:  acute blood loss    Lab Results   Component Value Date    HGB 10.2 10/19/2021    HGB 12.4 10/04/2021    HGB 10.6 08/19/2021    HGB 10.5 08/18/2021    HGB 11.5 08/17/2021       Estimate Blood Loss:       BMI: Body mass index is 26.17 kg/m². Over Weight     Disposition: Home    Patient Instructions:    Katharine Frost   Home Medication Instructions SQP:[id number]    Printed on:10/19/21 1133   Medication Information                      Acetaminophen (TYLENOL 8 HOUR ARTHRITIS PAIN PO)  Take by mouth             DULoxetine (CYMBALTA) 60 MG extended release capsule  Take 60 mg by mouth daily             HYDROcodone-acetaminophen (NORCO) 5-325 MG per tablet  Take 1-2 tablets by mouth every 6 hours as needed for Pain for up to 7 days. lisinopril (PRINIVIL;ZESTRIL) 20 MG tablet  Take 20 mg by mouth daily             Multiple Vitamins-Minerals (MULTIVITAMIN & MINERAL PO)  Take by mouth             simvastatin (ZOCOR) 20 MG tablet  Take 20 mg by mouth nightly               Activity: Physical Therapy  Wound Care: keep wound clean and dry, shower after 4 days if no drainage  Other:      Follow-up with Nikolay Pickard MD in 2 weeks.     Signed:  Nikolay Pickard MD, M.D.  10/19/2021  11:33 AM

## 2021-10-19 NOTE — OP NOTE
[address], [place] [address]                                OPERATIVE REPORT    PATIENT NAME: Tee Abdi                    :        1945  MED REC NO:   [id number]                              ROOM:       0335  ACCOUNT NO:   [de-identified]                           ADMIT DATE: 10/18/2021  PROVIDER:     Darline Diana    DATE OF PROCEDURE:  10/18/2021    PREOPERATIVE DIAGNOSIS:  Massive rotator cuff tear, right shoulder. POSTOPERATIVE DIAGNOSIS:  Massive rotator cuff tear, right shoulder. PROCEDURE PERFORMED:  Right reverse total shoulder. SURGEON:  Dr. Darline Diana. ANESTHESIA:  General with scalene block. DESCRIPTION OF PROCEDURE:  The patient was brought into the operating  room and placed in the supine position on the operating table. General  anesthetic was administered. The patient was then placed into a  beach-chair position. Status post scalene block to the right shoulder,  the right shoulder girdle was prepped with ChloraPrep and draped in a  sterile fashion. A deltopectoral groove incision was made. This was  taken down through the skin and subcutaneous tissue. Cephalic vein was  identified and it was taken medially. Deep retractor was placed  underneath the deltoid. There was lot of scarring between the deltoid  and the underlying rotator cuff and this was freed up. The patient  completely had a tuberosity fracture involving the greater tuberosity  with retraction of the rotator cuff, this was debrided. The biceps  tendon was identified and it was tenodesed between the bicipital groove  region. The subscapularis tendon was taken down and preserved. Moderate degenerative changes were noted involving the glenoid surface. The glenoid labrum was excised. The stump of the biceps tendon was  released.   Adhesions between the subscapularis and the glenoid was taken  down to allow for excursion. The supraspinatus was absent and the area  on the infraspinatus was freed up appropriately. This was all performed  after the humeral head had been osteotomized. A central hole was placed  down the canal and this was reamed up to a size 10. The cutting jig was  used and the head was osteotomized. The stem was left in the canal for  protection of the canal.  The articular cartilage remaining on the  glenoid was then excised and positioned for the central peg on the  glenoid plate was chosen and a guide pin was placed. This was  over-reamed and then the plate was placed and fixed with two peripheral  screws and a 20-mm central screw. Excellent purchase was obtained in  all three screws with placement of the hardware. The wound was then  irrigated out with Simpulse. The trial head was placed onto the humeral  stem after 36 mm glenosphere had been assembled. A trial reduction was  carried out with a +0 poly. This appeared to be appropriate length and  gave excellent stability. This was removed. The trial stem was  removed. The shoulder was again irrigated out with Simpulse with  gentamicin in the irrigation solution. A size 10 Biomet reverse  shoulder humeral stem was then seated with the mini-humeral tray and the  36-mm inner diameter standard poly. _____ this was tried this appeared  to be appropriate length. The trial material was removed and a size 10  stem was then inserted and seated appropriately. Final trimming was  performed around the humeral head to remove any loose pieces of  cartilage and bone and soft tissue and then the articular plate was  assembled on the humerus. This was reduced, brought through range of  motion and was found to be stable. The subscapularis tendon was then  reapproximated and repaired with Ethibond sutures. The deep retractors  were removed. The wound was irrigated out. The cephalic vein was noted  to be intact and a drain was placed.   The deep fascial structures and  subcutaneous were closed with 2-0 Vicryl interrupted sutures. Skin  margins were injected with the total joint compound. Skin was closed  with a 4-0 undyed Monocryl subcuticular stitch followed by Steri-Strips  in the skin. Dressed with fluffs and a Tegaderm. Estimated blood loss  150 mL. The patient was placed into a simple sling. General anesthetic  was discontinued. The patient was transferred to Recovery in a stable  condition. DOREEN MAYBERRY    D: 10/18/2021 15:55:54       T: 10/18/2021 16:01:22     [email or web address]  Job#: [id number]     Doc#: [id number]    CC:

## 2021-10-19 NOTE — PROGRESS NOTES
Occupational Therapy  Facility/Department: [facility] AT THE [facility] MED SURG  Daily Treatment Note  NAME: Manohar Holland  : 1945  MRN: [id number]    Date of Service: 10/19/2021    Discharge Recommendations:  Continue to assess pending progress       Assessment      OT Education: ADL Adaptive Strategies  Patient Education: HEP c pendulums, elbow, wrist and hand exercises  Safety Devices  Safety Devices in place: Yes  Type of devices: All fall risk precautions in place; Left in bed;Call light within reach;Nurse notified  Restraints  Initially in place: No         Patient Diagnosis(es): The encounter diagnosis was Status post reverse total replacement of right shoulder. has a past medical history of Arthritis, Bell's palsy, Hyperlipidemia, and Seasonal allergies. has a past surgical history that includes  section; Colonoscopy (); Appendectomy; other surgical history (Right, 2016); other surgical history (Right, 2016); other surgical history (2017); Nerve Block Lumb Facet Level 1 Bilateral (Right, 2017); Humerus Closed Reduction (Right, 2021); Humerus Closed Reduction (Right, 2021); back surgery; Pain management procedure (2020); and Total shoulder arthroplasty (Right, 10/18/2021). Restrictions  Restrictions/Precautions  Restrictions/Precautions: Fall Risk, General Precautions  Required Braces or Orthoses?: Yes  Required Braces or Orthoses  Right Upper Extremity Brace/Splint: Sling  Subjective   General  Chart Reviewed: Yes  Patient assessed for rehabilitation services?: Yes  Family / Caregiver Present: No  Referring Practitioner: Jane Nuñez  Diagnosis: Status post reverse total shoulder replacement. Subjective  Subjective: Patient c no c/o pain. General Comment  Comments: Patient is with basic UE sling. Patient and nurse stated that they are waiting for Dr. Mary Monterroso to release orders for shoulder abduction sling for proper positioning.       Orientation     Objective ADL  Additional Comments: Patient educated on techniques to complete UB bathing/dressing while maintaining R TSR precautions. Patient verbalized understanding and able to state proper technique. Educated on use of shower chair upon return home for decrease risk of falls. Cognition  Overall Cognitive Status: WFL                    Type of ROM/Therapeutic Exercise  Comment: Patient educated HEP with pendulums, elbow, wrist and hand exercises to maintain ROM and prevent swelling. Patient verbalized understanding. Plan   Plan  Times per week: 7x  Times per day: Daily  Current Treatment Recommendations: Safety Education & Training, Patient/Caregiver Education & Training, Self-Care / ADL, Positioning, ROM    Goals  Short term goals  Time Frame for Short term goals: 24  Short term goal 1: Pt will complete ADL routine with SUP and AE PRN to increase independence. Short term goal 2: Pt will be educated on pendulum swings following surgery to maintain ROM. Short term goal 3: Pt will be educated on appropriate AE and techniques to maximize independence and safety with dressing and bathing. Short term goal 4: Pt will report and demo understanding of discharge recommendations and HEP.        Therapy Time   Individual Concurrent Group Co-treatment   Time In 1310         Time Out 1325         Minutes 24 [facility], OTR/L

## 2021-10-19 NOTE — PROGRESS NOTES
Physical Therapy  Facility/Department: [facility] AT THE [facility] MED SURG  Daily Treatment Note  NAME: Antonia Yang  : 1945  MRN: [id number]    Date of Service: 10/19/2021    Discharge Recommendations:  Continue to assess pending progress, Home with Home health PT, Outpatient PT   PT Equipment Recommendations  Equipment Needed: No    Assessment   Body structures, Functions, Activity limitations: Decreased functional mobility ; Decreased balance;Decreased ADL status; Decreased ROM; Decreased endurance;Decreased strength;Decreased posture;Decreased high-level IADLs  Assessment: Seated exercises B LE x20. Ambulation [id number], 50ftx2 no AD. SUP for bed mobility and CGA/SBA with transfers. Treatment Diagnosis: R reverse total shoulder, generalized weakness  Prognosis: Good  Decision Making: Medium Complexity  REQUIRES PT FOLLOW UP: Yes  Activity Tolerance  Activity Tolerance: Patient Tolerated treatment well     Patient Diagnosis(es): The encounter diagnosis was Status post reverse total replacement of right shoulder. has a past medical history of Arthritis, Bell's palsy, Hyperlipidemia, and Seasonal allergies. has a past surgical history that includes  section; Colonoscopy (); Appendectomy; other surgical history (Right, 2016); other surgical history (Right, 2016); other surgical history (2017); Nerve Block Lumb Facet Level 1 Bilateral (Right, 2017); Humerus Closed Reduction (Right, 2021); Humerus Closed Reduction (Right, 2021); back surgery; Pain management procedure (2020); and Total shoulder arthroplasty (Right, 10/18/2021).     Restrictions  Restrictions/Precautions  Restrictions/Precautions: Fall Risk, General Precautions  Required Braces or Orthoses?: Yes  Required Braces or Orthoses  Right Upper Extremity Brace/Splint: Sling  Subjective   General  Chart Reviewed: Yes  Family / Caregiver Present: Yes  Subjective  Subjective: Pt. reports pain in R shoulder 6/10 upon arrival. Pt. family present during treatment time and stated she is waiting for Dr. Janice Moya to order a sling for R UE to d/c          Orientation     Cognition      Objective   Bed mobility  Rolling to Right: Supervision  Supine to Sit: Supervision  Sit to Supine: Supervision  Scooting: Supervision  Transfers  Sit to Stand: Stand by assistance  Stand to sit: Stand by assistance  Ambulation  Ambulation?: Yes  Ambulation 1  Surface: level tile  Device: No Device  Assistance: Contact guard assistance  Quality of Gait: Pt ambulates with forward flexed posture  Distance: [id number], 50ftx2  Stairs/Curb  Stairs?: No     Balance  Posture: Fair  Sitting - Static: Good  Sitting - Dynamic: Good  Standing - Static: Fair  Standing - Dynamic: Fair  Exercises  Comments: Seated exercises B LE x20   AROM RLE (degrees)  RLE AROM: WFL  AROM LLE (degrees)  LLE AROM : WFL  Strength RLE  Strength RLE: WFL  Strength LLE  Strength LLE: WFL    G-Code     OutComes Score    AM-PAC Score    Goals  Short term goals  Time Frame for Short term goals: 10 days  Short term goal 1: Patient will ambulate 48' with supervision without LOB  Short term goal 2: Patient will perform transfers and bed mobility with MI  Short term goal 3: Patient will tolerate 20-30 minutes of therex/act to improve endurance for ADLs  Patient Goals   Patient goals : Return home    Plan    Plan  Times per week: 7  Times per day: Twice a day  Plan Comment: 1x/day on weekends  Safety Devices  Type of devices:  All fall risk precautions in place, Nurse notified, Gait belt, Left in bed     Therapy Time   Individual Concurrent Group Co-treatment   Time In 1337         Time Out 1401         Minutes [address], [place]

## 2021-10-19 NOTE — DISCHARGE INSTR - COC
Continuity of Care Form    Patient Name: Chavo Isaacs   :  1945  MRN:  [id number]    Admit date:  10/18/2021  Discharge date:  ***    Code Status Order: Full Code   Advance Directives:   Advance Care Flowsheet Documentation     Date/Time Healthcare Directive Type of Healthcare Directive Copy in [address] Agent's Name Healthcare Agent's Phone Number    10/18/21 5985  Yes, patient has an advance directive for healthcare treatment  Living will;Durable power of  for health care  No, copy requested from family  [facility] Physician:  Jessica Aguilar MD  PCP: Johnathon Boggs MD    Discharging Nurse: [facility] Unit/Room#: 0910/1813-47  Discharging Unit Phone Number: ***    Emergency Contact:   Extended Emergency Contact Information  Primary Emergency Contact: Rancho Knight, [address] [place] [address]  Home Phone: [phone]  Relation: Child    Past Surgical History:  Past Surgical History:   Procedure Laterality Date    APPENDECTOMY      BACK SURGERY       SECTION      COLONOSCOPY      HUMERUS CLOSED REDUCTION Right 2021    HUMERUS CLOSED REDUCTION Right 2021    HUMERUS CLOSED REDUCTION performed by Jessica Aguilar MD at [facility] [address] [address] [facility] [address] BILATERAL Right 2017    LUMBAR FACET L-4-5,L5-S1 performed by Michael Dee MD at [place] [address]. Right 2016    SI pain injection- Dr. Pamela Reilly Right 2016    S1 RFA    OTHER SURGICAL HISTORY  2017    pain injection    PAIN MANAGEMENT PROCEDURE  2020    Pain/nerve stimulator    SHOULDER ARTHROPLASTY Right 10/18/2021    Dr. Alessia Casanova       Immunization History:   Immunization History   Administered Date(s) Administered    ENOCHID-19, Chan Peter, PF, 30mcg/0.3mL 2021, 2021       Active Problems:  Patient Active Problem List   Diagnosis Code  Lumbar post-laminectomy syndrome M96.1    [facility] not elsewhere 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       Isolation/Infection:   Isolation          No Isolation        Patient Infection Status     None to display          Nurse Assessment:  Last Vital Signs: /65   Pulse 95   Temp 97.6 °F (36.4 °C) (Temporal)   Resp 18   Ht 5' (1.524 m)   Wt 134 lb (60.8 kg)   SpO2 95%   BMI 26.17 kg/m²     Last documented pain score (0-10 scale): Pain Level: 2  Last Weight:   Wt Readings from Last 1 Encounters:   10/19/21 134 lb (60.8 kg)     Mental Status:  {IP PT MENTAL STATUS:}    IV Access:  { BRINDA IV ACCESS:591572536}    Nursing Mobility/ADLs:  Walking   {P DME WIQY:334356855}  Transfer  {P DME DHHK:643592930}  Bathing  {P DME CEU}  Dressing  {P DME HBJO:696256307}  Toileting  {P DME PGMA:174711548}  Feeding  {P DME YJVI:470724497}  Med Admin  {P DME AGGX:039938117}  Med Delivery   {[facility] MED Delivery:755654928}    Wound Care Documentation and Therapy:  Incision 17 Back Right (Active)   Number of days: 5177        Elimination:  Continence:   · Bowel: {YES / JK:88764}  · Bladder: {YES / EA:39398}  Urinary Catheter: {Urinary Catheter:898399421}   Colostomy/Ileostomy/Ileal Conduit: {YES / HL:68112}       Date of Last BM: ***    Intake/Output Summary (Last 24 hours) at 10/19/2021 1339  Last data filed at 10/19/2021 1049  Gross per 24 hour   Intake 2903.6 ml   Output 375 ml   Net 2528.6 ml     I/O last 3 completed shifts:   In: 7874 [I.V.:1600; IV Piggyback:50]  Out: 250 [Blood:250]    Safety Concerns:     [address] Safety Concerns:194616560}    Impairments/Disabilities:      [address] PARRY Impairments/Disabilities:564183529}    Nutrition Therapy:  Current Nutrition Therapy:   [address] BRINDA Diet List:682229865}    Routes of Feeding: {CHP DME Other Feedings:276831891}  Liquids: {Slp liquid thickness:70019}  Daily Fluid Restriction: {CHP DME Yes amt example:665458417}  Last Modified Barium Swallow with Video (Video Swallowing Test): {Done Not Done DAYR:464743820}    Treatments at the Time of Hospital Discharge:   Respiratory Treatments: ***  Oxygen Therapy:  {Therapy; copd oxygen:04338}  Ventilator:    { CC Vent VYVN:717050217}    Rehab Therapies: {THERAPEUTIC INTERVENTION:7244385849}  Weight Bearing Status/Restrictions: [address] RUCK[address] Weight Bearin}  Other Medical Equipment (for information only, NOT a DME order):  {EQUIPMENT:875430296}  Other Treatments: ***    Patient's personal belongings (please select all that are sent with patient):  {[facility] DME Belongings:812891629}    RN SIGNATURE:  {Esignature:548185081}    CASE MANAGEMENT/SOCIAL WORK SECTION    Inpatient Status Date: ***    Readmission Risk Assessment Score:  Readmission Risk              Risk of Unplanned Readmission:  0           Discharging to Facility/ Agency   · Name:   · Address:  · Phone:  · Fax:    Dialysis Facility (if applicable)   · Name:  · Address:  · Dialysis Schedule:  · Phone:  · Fax:    / signature: {Esignature:961397189}    PHYSICIAN SECTION    Prognosis: {Prognosis:2988851125}    Condition at Discharge: [address] Patient Condition:247373655}    Rehab Potential (if transferring to Rehab): {Prognosis:9132555899}    Recommended Labs or Other Treatments After Discharge: ***    Physician Certification: I certify the above information and transfer of Linh Keyes  is necessary for the continuing treatment of the diagnosis listed and that she requires {Admit to Appropriate Level of Care:93249} for {GREATER/LESS:473112192} 30 days.      Update Admission H&P: {CHP DME Changes in IFAKV:718806122}    PHYSICIAN SIGNATURE:  {[facility]:840739653}

## 2021-10-19 NOTE — PROGRESS NOTES
Comprehensive Nutrition Assessment    Type and Reason for Visit:  Initial (missing malnutrition screen )    Nutrition Recommendations/Plan:   1. Continue current diet. 2. Encourage protein foods with meals to aid healing needs. 3. Start strawberry ensure enlive, 4 oz TID with meals. Nutrition Assessment:  increased nutrient needs r/t acute injury/trauma aeb s/p R total shoulder surgery. Pt states of 17# weight loss x 2 months, but not reflected by historical weights. Pt lives at [place], states of difficulty with meal prep due to  R shoulder and inability to peel potatoes, carrots, etc. Pt agrees to try strawberry ensure enlive. Encouraged protein foods for healing needs. Malnutrition Assessment:  Malnutrition Status: At risk for malnutrition (Comment)    Context:  Acute Illness     Findings of the 6 clinical characteristics of malnutrition:  Energy Intake:  Mild decrease in energy intake (Comment)  Weight Loss:  No significant weight loss     Body Fat Loss:  No significant body fat loss     Muscle Mass Loss:  No significant muscle mass loss    Fluid Accumulation:  No significant fluid accumulation     Strength:  Not Performed      Nutrition Related Findings:  appears well nourished      Wounds:  Surgical Incision       Current Nutrition Therapies:    ADULT DIET; Regular    Anthropometric Measures:  · Height: 5' (152.4 cm)  · Current Body Weight: 134 lb (60.8 kg)   · Admission Body Weight: 133 lb 3.2 oz (60.4 kg)    · Usual Body Weight: 141 lb 3.2 oz (64 kg) (8/19/21)     · Ideal Body Weight: 100 lbs; % Ideal Body Weight 134 %   · BMI: 26.2  · BMI Categories: Overweight (BMI 25.0-29. 9)       Nutrition Diagnosis:   · Increased nutrient needs related to acute injury/trauma as evidenced by other (comment) (healing needs from s/p R total shoulder )      Nutrition Interventions:   Food and/or Nutrient Delivery:  Continue Current Diet, Start Oral Nutrition Supplement  Nutrition Education/Counseling: No recommendation at this time   Coordination of Nutrition Care:  Continue to monitor while inpatient    Goals:  PO > 75% of meals and good protein sources to aid healing       Nutrition Monitoring and Evaluation:   Behavioral-Environmental Outcomes:  None Identified   Food/Nutrient Intake Outcomes:  Food and Nutrient Intake  Physical Signs/Symptoms Outcomes:  Biochemical Data, Weight, Skin     Discharge Planning:    Continue current diet, Continue Oral Nutrition Supplement     Electronically signed by Ema Galvan RD, LD on 10/19/21 at 8:11 AM EDT    Contact: [phone]

## 2021-10-19 NOTE — PROGRESS NOTES
Pt and pts daughter are asking about a special sling that pt is suppose to be d/c with besides the basic blue one that is on the pt now, I have  Asked other staff members on the unit, I have called surgery department, supplies, and the ER to ask what this possibly could be because there is nothing mentioned in Dr. Darolyn Gowers note or in the orders in in the AVS. The sling was mentioned yesterday to pt, but was not spoke about today when he was rounding just placed a call to the office and left a message with my call back number, waiting to hear back.

## 2021-10-19 NOTE — PROGRESS NOTES
Discussed discharge plans with the patient. Patient is a 64year old female here with Right reverse total shoulder. . She is alert, oriented , pleasant , and cooperative during our conversation.     Patient is  and lives alone at [facility]. She uses a cane at times. Patient does her own cooking and house keeping. She is independent with her ADL's. Patient manages her own medication and drives. She is unable to drive so family and friends been doing the driving. Patient has no outside services in the home.     Her PCP is Dr. Cooper Cruz MD. She has medical insurance that helps with medication cost.     The discharge plan is home with outpatient PT when doctor is ready for her to start. She has advance directives but not on file.  LSW to monitor and assist with any needs or concerns as they arise.       SANGITA Dumont

## 2021-10-19 NOTE — CONSULTS
NE[facility] -- Hospitalist  Progress Note 10/19/21    SUBJECTIVE:    Patient seen for f/u of post op medical management of potential medical complications. She is POD # 1 s/p right total shoulder. Her pain is well controlled. She denies nausea and vomiting. Last BM was prior to surgery. She denies any chest pain, palpitations or shortness of breath. She is doing well with PT    ROS:   Constitutional: negative  for fevers, and negative for chills. Respiratory: negative for shortness of breath, negative for cough, and negative for wheezing  Cardiovascular: negative for chest pain, and negative for palpitations  Gastrointestinal: negative for abdominal pain, negative for nausea,negative for vomiting, negative for diarrhea, and negative for constipation    OBJECTIVE:    Vitals:   Temp: 97.6 °F (36.4 °C)  BP: 120/65  Resp: 18  Pulse: 95  SpO2: 95 %    24HR INTAKE/OUTPUT:    Intake/Output Summary (Last 24 hours) at 10/19/2021 1039  Last data filed at 10/19/2021 0910  Gross per 24 hour   Intake 2708.79 ml   Output 375 ml   Net 2333.79 ml      -----------------------------------------------------------------    Exam:  GEN:    Awake, alert and oriented x3. EYES:  EOMI, pupils equal   NECK: Supple. No lymphadenopathy. No carotid bruit  CVS:    regular rate and rhythm, no audible murmur  PULM:  CTA, no wheezes, rales or rhonchi, no acute respiratory distress  ABD:    Bowels sounds normal.  Abdomen is soft. No distention. no tenderness to palpation. EXT:   no edema bilaterally . No calf tenderness. NEURO: Moves all extremities. Motor and sensory are grossly intact   SKIN:  Incision is dressed with minimal drainage.      Diagnostic Data:  Lab Results   Component Value Date    HGB 10.2 (L) 10/19/2021      Lab Results   Component Value Date    GLUCOSE 110 (H) 10/04/2021    BUN 21 10/04/2021    CREATININE 0.82 10/04/2021     (L) 10/04/2021    K 4.5 10/04/2021    CALCIUM 9.2 10/04/2021    CL 97 (L) 10/04/2021    CO2 22 10/04/2021       PROBLEM LIST:  Principal Problem:    Status post reverse total shoulder replacement  Resolved Problems:    * No resolved hospital problems.  *      ASSESSMENT / PLAN:    · Post op day # 1 s/p right total shoulder  · Continue current physical and occupational therapy  · Continue DVT prophylaxis-lovenox  · Acute post op blood loss anemia  · Monitor H/H - 10.2  · Discharge plan is home with outpatient PT    PRAKASH Mace CNP , APRN-CNP  10/19/2021  10:39 AM

## 2021-10-19 NOTE — PROGRESS NOTES
Department of Orthopedic Surgery  Progress Note    POD: 1    Subjective:       Systemic or Specific Complaints:No Complaints    Objective:     Patient Vitals for the past 24 hrs:   BP Temp Temp src Pulse Resp SpO2 Height Weight   10/19/21 0901 120/65 97.6 °F (36.4 °C) Temporal 95 18 95 %     10/19/21 0808       5' (1.524 m)    10/19/21 0445 (!) 150/78 97 °F (36.1 °C) Temporal 84 18 94 %  134 lb (60.8 kg)   10/18/21 2315 127/77 97 °F (36.1 °C) Temporal 102 18 95 %     10/18/21 1915 (!) 152/76 97.3 °F (36.3 °C) Temporal 103 18 95 %     10/18/21 1745 132/78 97.2 °F (36.2 °C) Temporal 85 18 94 %     10/18/21 1715 135/75 97.3 °F (36.3 °C) Temporal 88 18 93 %     10/18/21 1630 (!) 148/78 96.3 °F (35.7 °C) Temporal 80 18      10/18/21 1625 (!) 152/75 96.6 °F (35.9 °C) Temporal 79 18 93 %     10/18/21 1618 (!) 152/75   79 16 93 %     10/18/21 1602 (!) 153/70          10/18/21 1600 (!) 160/69   79 13 94 %     10/18/21 1550 (!) 144/68   79 14 94 %     10/18/21 1545 (!) 157/69   76 15 95 %     10/18/21 1540 (!) 156/75   85 21 93 %     10/18/21 1535 (!) 170/94 98 °F (36.7 °C) Temporal 91 16 93 %       In: 2903.6 [I.V.:2658.8]  Out: 375  In: 2903.6   Out: 375 [Drains:125]     General: alert, appears stated age and cooperative   Wound: Wound clean and dry no evidence of infection. Motion: Painless Range of Motion in affected extremity   DVT Exam: No evidence of DVT seen on physical exam.     Additional exam:     Data Review  CBC:   Lab Results   Component Value Date    WBC 5.7 10/19/2021    RBC 3.31 10/19/2021    HGB 10.2 10/19/2021    HCT 31.4 10/19/2021     10/19/2021           Assessment:      right reverse total shoulder.      Plan:      1:  Home today, RTO 2 weeks  2:  Continue Deep venous thrombosis prophylaxis  3:  Continue Pain Control    Chepe Moreno MD, MD

## 2022-01-09 ENCOUNTER — APPOINTMENT (OUTPATIENT)
Dept: CT IMAGING | Age: 77
End: 2022-01-09
Payer: MEDICARE

## 2022-01-09 ENCOUNTER — HOSPITAL ENCOUNTER (EMERGENCY)
Age: 77
Discharge: HOME | End: 2022-01-09
Attending: EMERGENCY MEDICINE
Payer: MEDICARE

## 2022-01-09 VITALS
HEART RATE: 91 BPM | SYSTOLIC BLOOD PRESSURE: 157 MMHG | OXYGEN SATURATION: 96 % | TEMPERATURE: 97.4 F | DIASTOLIC BLOOD PRESSURE: 81 MMHG | RESPIRATION RATE: 14 BRPM

## 2022-01-09 DIAGNOSIS — S09.93XA FACIAL INJURY, INITIAL ENCOUNTER: ICD-10-CM

## 2022-01-09 DIAGNOSIS — S01.21XA LACERATION OF NOSE, INITIAL ENCOUNTER: Primary | ICD-10-CM

## 2022-01-09 PROCEDURE — 90471 IMMUNIZATION ADMIN: CPT | Performed by: EMERGENCY MEDICINE

## 2022-01-09 PROCEDURE — 72125 CT NECK SPINE W/O DYE: CPT

## 2022-01-09 PROCEDURE — 99283 EMERGENCY DEPT VISIT LOW MDM: CPT

## 2022-01-09 PROCEDURE — 90715 TDAP VACCINE 7 YRS/> IM: CPT | Performed by: EMERGENCY MEDICINE

## 2022-01-09 PROCEDURE — 70450 CT HEAD/BRAIN W/O DYE: CPT

## 2022-01-09 PROCEDURE — 70486 CT MAXILLOFACIAL W/O DYE: CPT

## 2022-01-09 PROCEDURE — 6360000002 HC RX W HCPCS: Performed by: EMERGENCY MEDICINE

## 2022-01-09 RX ORDER — ASPIRIN 81 MG/1
81 TABLET ORAL DAILY
COMMUNITY

## 2022-01-09 RX ADMIN — TETANUS TOXOID, REDUCED DIPHTHERIA TOXOID AND ACELLULAR PERTUSSIS VACCINE, ADSORBED 0.5 ML: 5; 2.5; 8; 8; 2.5 SUSPENSION INTRAMUSCULAR at 20:31

## 2022-01-09 ASSESSMENT — PAIN SCALES - GENERAL: PAINLEVEL_OUTOF10: 4

## 2022-01-10 NOTE — ED NOTES
Patient states she got tripped up by a concrete slab. Patient denies LOC. Denies dizziness before or after fall.       Haresh Lua RN  01/09/22 9439

## 2022-01-10 NOTE — ED NOTES
Patient verbalized understanding of home care and care of wound. Discussed s/s to return to ER. Patient and family deny needs/questoins at this time.       Deborah Shelton RN  01/09/22 7230

## 2022-01-10 NOTE — ED NOTES
Patient ambulated without difficulty. Denies dizziness at this time.       Neeraj Mcgee RN  01/09/22 4112

## 2022-01-10 NOTE — ED PROVIDER NOTES
pain injection    PAIN MANAGEMENT PROCEDURE  08/2020    Pain/nerve stimulator    SHOULDER ARTHROPLASTY Right 10/18/2021    Dr. Traore [facility] Right 10/18/2021    SHOULDER TOTAL ARTHROPLASTY-REVERSE performed by Riki Flores MD at [address]       Discharge Medication List as of 1/9/2022  9:21 PM      CONTINUE these medications which have NOT CHANGED    Details   aspirin 81 MG EC tablet Take 81 mg by mouth dailyHistorical Med      Acetaminophen (TYLENOL 8 HOUR ARTHRITIS PAIN PO) Take by mouthHistorical Med      lisinopril (PRINIVIL;ZESTRIL) 20 MG tablet Take 20 mg by mouth dailyHistorical Med      DULoxetine (CYMBALTA) 60 MG extended release capsule Take 60 mg by mouth dailyHistorical Med      simvastatin (ZOCOR) 20 MG tablet Take 20 mg by mouth nightly      Multiple Vitamins-Minerals (MULTIVITAMIN & MINERAL PO) Take by mouth             ALLERGIES     Ciprofloxacin    FAMILY HISTORY     History reviewed. No pertinent family history. SOCIAL HISTORY       Social History     Socioeconomic History    Marital status:      Spouse name: None    Number of children: None    Years of education: None    Highest education level: None   Occupational History    None   Tobacco Use    Smoking status: Never Smoker    Smokeless tobacco: Never Used   Vaping Use    Vaping Use: Never used   Substance and Sexual Activity    Alcohol use:  Yes     Alcohol/week: 10.0 standard drinks     Types: 10 Cans of beer per week    Drug use: No    Sexual activity: None   Other Topics Concern    None   Social History Narrative    None     Social Determinants of Health     Financial Resource Strain:     Difficulty of Paying Living Expenses: Not on file   Food Insecurity:     Worried About Running Out of Food in the Last Year: Not on file    Javier of Food in the Last Year: Not on file   Transportation Needs:     Lack of Transportation (Medical): Not on file    Lack of Transportation (Non-Medical): Not on file   Physical Activity:     Days of Exercise per Week: Not on file    Minutes of Exercise per Session: Not on file   Stress:     Feeling of Stress : Not on file   Social Connections:     Frequency of Communication with Friends and Family: Not on file    Frequency of Social Gatherings with Friends and Family: Not on file    Attends [employer] Services: Not on file    Active Member of [address] [place] [address] [employer] or Organizations: Not on file    Attends Club or Organization Meetings: Not on file    Marital Status: Not on file   Intimate Partner Violence:     Fear of Current or Ex-Partner: Not on file    Emotionally Abused: Not on file    Physically Abused: Not on file    Sexually Abused: Not on file   Housing Stability:     Unable to Pay for Housing in the Last Year: Not on file    Number of [place] in the Last Year: Not on file    Unstable Housing in the Last Year: Not on file       SCREENINGS         Teresa Coma Scale  Eye Opening: Spontaneous  Best Verbal Response: Oriented  Best Motor Response: Obeys commands  Teresa Coma Scale Score: 15                     CIWA Assessment  BP: (!) 157/81  Pulse: 91                 PHYSICAL EXAM    (up to 7 for level 4, 8 or more for level 5)     ED Triage Vitals [01/09/22 1937]   BP Temp Temp Source Pulse Resp SpO2 Height Weight   (!) 182/80 97.4 °F (36.3 °C) Tympanic 91 14 97 % -- --       Physical Exam  Constitutional:       General: She is not in acute distress. Appearance: She is well-developed. She is not diaphoretic. HENT:      Head:      Comments: Nasal bridge, avulsed laceration hemostatic. No clinical signs of basilar skull fracture. No septal hematoma. Eyes:      General:         Right eye: No discharge. Left eye: No discharge. Neck:      Trachea: No tracheal deviation. Cardiovascular:      Rate and Rhythm: Normal rate and regular rhythm. Heart sounds: No murmur heard.   No friction rub. Pulmonary:      Effort: Pulmonary effort is normal. No respiratory distress. Breath sounds: No stridor. No wheezing or rales. Chest:      Chest wall: No tenderness. Abdominal:      General: There is no distension. Palpations: Abdomen is soft. There is no mass. Tenderness: There is no abdominal tenderness. There is no guarding or rebound. Musculoskeletal:         General: No tenderness or deformity. Normal range of motion. Cervical back: Normal range of motion. Skin:     General: Skin is warm. Findings: No erythema or rash. Neurological:      Mental Status: She is alert and oriented to person, place, and time. Psychiatric:         Behavior: Behavior normal.         DIAGNOSTIC RESULTS     EKG: All EKG's are interpreted by the Emergency Department Physician who either signs or Co-signs this chart in the absence of a cardiologist.        RADIOLOGY:   Non-plain film images such as CT, Ultrasound and MRI are read by the radiologist. Plain radiographic images are visualized and preliminarily interpreted by the emergency physician with the below findings:        Interpretation per the Radiologist below, if available at the time of this note:    CT Head WO Contrast   Final Result   1. No acute intracranial abnormality nor acute calvarial fracture. 2. No acute facial fracture. 3. Laceration of the left nose with no underlying foreign body. 4. Dental disease. CT Cervical Spine WO Contrast   Final Result   No acute abnormality of the cervical spine. CT FACIAL BONES WO CONTRAST   Final Result   1. No acute intracranial abnormality nor acute calvarial fracture. 2. No acute facial fracture. 3. Laceration of the left nose with no underlying foreign body. 4. Dental disease.                ED BEDSIDE ULTRASOUND:   Performed by ED Physician - none    LABS:  Labs Reviewed - No data to display    All other labs were within normal range or not returned as of this dictation. EMERGENCY DEPARTMENT COURSE and DIFFERENTIAL DIAGNOSIS/MDM:   Vitals:    Vitals:    01/09/22 1937 01/09/22 1945 01/09/22 2000   BP: (!) 182/80 (!) 171/64 (!) 157/81   Pulse: 91     Resp: 14     Temp: 97.4 °F (36.3 °C)     TempSrc: Tympanic     SpO2: 97% 97% 96%         MDM  Number of Diagnoses or Management Options  Facial injury, initial encounter  Laceration of nose, initial encounter  Diagnosis management comments: 75-year-old female presented concern for nonsyncopal fall with head trauma no loss conscious. Exam was pertinent for a avulsed laceration to patient's nasal bridge region. Due to patient's age and mechanism head CT cervical neck CT and facial CT was ordered. Patient's tetanus status was also updated. Patient's imaging was negative for any acute intracranial hemorrhage, any cervical or facial fractures. Patient was reassessed and comfortably resting in bed and she is provided extensive return precautions. At time of discharge patient was tolerant p.o. not hypotensive or tachycardic ambulate baseline without acute focal neuro deficits and otherwise clinically stable. REASSESSMENT          CRITICAL CARE TIME   Total Critical Care time was  minutes, excluding separately reportable procedures. There was a high probability of clinically significant/life threatening deterioration in the patient's condition which required my urgent intervention. CONSULTS:  None    PROCEDURES:  Unless otherwise noted below, none     Procedures        FINAL IMPRESSION      1. Laceration of nose, initial encounter    2. Facial injury, initial encounter          DISPOSITION/PLAN   DISPOSITION Decision To Discharge 01/09/2022 09:19:58 PM      PATIENT REFERRED TO:  No follow-up provider specified.     DISCHARGE MEDICATIONS:  Discharge Medication List as of 1/9/2022  9:21 PM        Controlled Substances Monitoring:     RX Monitoring 8/19/2021   Acute Pain Prescriptions Severe pain not adequately treated with lower dose. Periodic Controlled Substance Monitoring No signs of potential drug abuse or diversion identified.        (Please note that portions of this note were completed with a voice recognition program.  Efforts were made to edit the dictations but occasionally words are mis-transcribed.)    Blank Berkowitz MD (electronically signed)  Attending Emergency Physician            Blank Berkowitz MD  01/09/22 9404       Blank Berkowitz MD  01/11/22 4505

## 2023-11-24 ENCOUNTER — TELEPHONE (OUTPATIENT)
Dept: INTERVENTIONAL RADIOLOGY/VASCULAR | Age: 78
End: 2023-11-24

## 2023-11-29 ENCOUNTER — HOSPITAL ENCOUNTER (OUTPATIENT)
Age: 78
Discharge: HOME | End: 2023-11-29
Payer: MEDICARE

## 2023-11-29 LAB
BACTERIA URNS QL MICRO: ABNORMAL
BILIRUB UR QL STRIP: NEGATIVE
CLARITY UR: ABNORMAL
COLOR UR: YELLOW
EPI CELLS #/AREA URNS HPF: ABNORMAL /HPF (ref 0–25)
GLUCOSE UR STRIP-MCNC: NEGATIVE MG/DL
HGB UR QL STRIP.AUTO: ABNORMAL
KETONES UR STRIP-MCNC: NEGATIVE MG/DL
LEUKOCYTE ESTERASE UR QL STRIP: ABNORMAL
NITRITE UR QL STRIP: POSITIVE
PH UR STRIP: 6 [PH] (ref 5–9)
PROT UR STRIP-MCNC: ABNORMAL MG/DL
RBC #/AREA URNS HPF: ABNORMAL /HPF (ref 0–2)
SP GR UR STRIP: 1.02 (ref 1.01–1.02)
UROBILINOGEN UR STRIP-ACNC: NORMAL EU/DL (ref 0–1)
WBC #/AREA URNS HPF: ABNORMAL /HPF (ref 0–5)

## 2023-11-29 PROCEDURE — 81001 URINALYSIS AUTO W/SCOPE: CPT

## 2023-11-29 PROCEDURE — 87086 URINE CULTURE/COLONY COUNT: CPT

## 2023-11-29 PROCEDURE — 87077 CULTURE AEROBIC IDENTIFY: CPT

## 2023-11-29 PROCEDURE — 87186 SC STD MICRODIL/AGAR DIL: CPT

## 2023-12-01 LAB
MICROORGANISM SPEC CULT: ABNORMAL
SPECIMEN DESCRIPTION: ABNORMAL

## 2023-12-07 ENCOUNTER — TRANSCRIBE ORDERS (OUTPATIENT)
Dept: INTERVENTIONAL RADIOLOGY/VASCULAR | Age: 78
End: 2023-12-07

## 2023-12-07 DIAGNOSIS — M43.16 SPONDYLOLISTHESIS, LUMBAR REGION: Primary | ICD-10-CM

## 2023-12-14 ENCOUNTER — HOSPITAL ENCOUNTER (OUTPATIENT)
Dept: CT IMAGING | Age: 78
Discharge: HOME | End: 2023-12-16
Payer: MEDICARE

## 2023-12-14 ENCOUNTER — HOSPITAL ENCOUNTER (OUTPATIENT)
Dept: INTERVENTIONAL RADIOLOGY/VASCULAR | Age: 78
Discharge: HOME | End: 2023-12-16
Payer: MEDICARE

## 2023-12-14 VITALS
HEIGHT: 60 IN | SYSTOLIC BLOOD PRESSURE: 132 MMHG | OXYGEN SATURATION: 98 % | HEART RATE: 74 BPM | BODY MASS INDEX: 26.31 KG/M2 | TEMPERATURE: 97.9 F | WEIGHT: 134 LBS | DIASTOLIC BLOOD PRESSURE: 56 MMHG | RESPIRATION RATE: 14 BRPM

## 2023-12-14 DIAGNOSIS — M43.16 SPONDYLOLISTHESIS, LUMBAR REGION: ICD-10-CM

## 2023-12-14 LAB
INR PPP: 0.9
PLATELET # BLD AUTO: 226 K/UL (ref 138–453)
PROTHROMBIN TIME: 12.4 SEC (ref 11.7–14.9)

## 2023-12-14 PROCEDURE — 72265 MYELOGRAPHY L-S SPINE: CPT

## 2023-12-14 PROCEDURE — 72132 CT LUMBAR SPINE W/DYE: CPT

## 2023-12-14 PROCEDURE — 85049 AUTOMATED PLATELET COUNT: CPT

## 2023-12-14 PROCEDURE — 6370000000 HC RX 637 (ALT 250 FOR IP): Performed by: RADIOLOGY

## 2023-12-14 PROCEDURE — 85610 PROTHROMBIN TIME: CPT

## 2023-12-14 PROCEDURE — 6360000004 HC RX CONTRAST MEDICATION: Performed by: RADIOLOGY

## 2023-12-14 RX ORDER — ACETAMINOPHEN AND CODEINE PHOSPHATE 300; 30 MG/1; MG/1
1 TABLET ORAL ONCE
Status: DISCONTINUED | OUTPATIENT
Start: 2023-12-14 | End: 2023-12-14

## 2023-12-14 RX ORDER — SODIUM CHLORIDE 0.9 % (FLUSH) 0.9 %
5-40 SYRINGE (ML) INJECTION PRN
Status: DISCONTINUED | OUTPATIENT
Start: 2023-12-14 | End: 2023-12-17 | Stop reason: HOSPADM

## 2023-12-14 RX ORDER — SODIUM CHLORIDE 9 MG/ML
INJECTION, SOLUTION INTRAVENOUS PRN
Status: DISCONTINUED | OUTPATIENT
Start: 2023-12-14 | End: 2023-12-17 | Stop reason: HOSPADM

## 2023-12-14 RX ORDER — HYDROCODONE BITARTRATE AND ACETAMINOPHEN 5; 325 MG/1; MG/1
1 TABLET ORAL ONCE
Status: COMPLETED | OUTPATIENT
Start: 2023-12-14 | End: 2023-12-14

## 2023-12-14 RX ORDER — IOPAMIDOL 408 MG/ML
10 INJECTION, SOLUTION INTRATHECAL
Status: COMPLETED | OUTPATIENT
Start: 2023-12-14 | End: 2023-12-14

## 2023-12-14 RX ORDER — SODIUM CHLORIDE 0.9 % (FLUSH) 0.9 %
5-40 SYRINGE (ML) INJECTION EVERY 12 HOURS SCHEDULED
Status: DISCONTINUED | OUTPATIENT
Start: 2023-12-14 | End: 2023-12-17 | Stop reason: HOSPADM

## 2023-12-14 RX ORDER — SODIUM CHLORIDE 9 MG/ML
INJECTION, SOLUTION INTRAVENOUS CONTINUOUS
Status: DISCONTINUED | OUTPATIENT
Start: 2023-12-14 | End: 2023-12-17 | Stop reason: HOSPADM

## 2023-12-14 RX ADMIN — HYDROCODONE BITARTRATE AND ACETAMINOPHEN 1 TABLET: 5; 325 TABLET ORAL at 12:43

## 2023-12-14 RX ADMIN — IOPAMIDOL 12 ML: 408 INJECTION, SOLUTION INTRATHECAL at 13:31

## 2023-12-14 ASSESSMENT — PAIN SCALES - GENERAL
PAINLEVEL_OUTOF10: 9
PAINLEVEL_OUTOF10: 9
PAINLEVEL_OUTOF10: 0
PAINLEVEL_OUTOF10: 0

## 2023-12-14 ASSESSMENT — PAIN - FUNCTIONAL ASSESSMENT
PAIN_FUNCTIONAL_ASSESSMENT: 0-10
PAIN_FUNCTIONAL_ASSESSMENT: 0-10

## 2023-12-14 ASSESSMENT — PAIN DESCRIPTION - LOCATION
LOCATION: BACK

## 2023-12-14 ASSESSMENT — PAIN DESCRIPTION - ORIENTATION
ORIENTATION: MID;LOWER

## 2023-12-14 NOTE — H&P
in her father. Social History   reports that she has never smoked. She has never used smokeless tobacco.   reports current alcohol use of about 10.0 standard drinks of alcohol per week. reports no history of drug use. Marital Status   Occupation none    Review of Systems:  CONSTITUTIONAL:   negative for fevers, chills, fatigue and malaise    EYES:   negative for double vision, blurred vision and photophobia    HEENT:   negative for tinnitus, epistaxis and sore throat     RESPIRATORY:   negative for cough, shortness of breath, wheezing     CARDIOVASCULAR:   negative for chest pain, palpitations, syncope, edema     GASTROINTESTINAL:   negative for nausea, vomiting     GENITOURINARY/RENAL:   negative for incontinence     MUSCULOSKELETAL:   See HPI   NEUROLOGICAL:   See HPI     PSYCHIATRIC:   negative for anxiety        OBJECTIVE:   VITALS:  vitals were not taken for this visit. Per database in [employer]. CONSTITUTIONAL:alert & cooperative, no acute distress. Very pleasant, appears younger than stated age, present with friend today. SKIN:  Brief inspection of skin, warm and dry, no rashes on exposed areas of skin   HEAD:  Normocephalic, atraumatic   EYES: wearing glasses. EOMs intact. EARS:  Hearing grossly WNL. NOSE:  Nares patent. No rhinorrhea   MOUTH/THROAT:  benign  NECK:good ROM   LUNGS: Clear to auscultation bilaterally, no wheezes. CARDIOVASCULAR: Heart sounds are normal.  Regular rate and rhythm without murmur. ABDOMEN: non distended. EXTREMITIES: no edema bilateral lower extremities. IMPRESSIONS:   Chronic pain   has a past medical history of Arthritis, Bell's palsy, Hyperlipidemia, Hypertension, Lumbar disc disease, Seasonal allergies, and Wears glasses.    PLANS:   Myelogram, lumbosacral    KENDALL Becky Edwards PA-C  Electronically signed 12/14/2023 at 11:54 AM

## 2023-12-14 NOTE — PLAN OF CARE
Patient denies pain, up drinking coffee. Discharge instructions given to patient and friend.  No questions or concerns

## 2023-12-14 NOTE — DISCHARGE INSTRUCTIONS
Myelogram Discharge Instructions      The procedure that you have undergone is called a myelogram, which is an x-ray of the spinal canal, taken after the injection of a colorless x-ray contrast media (dye). There are a few important guidelines you should follow after having a myelogram, which include:    Keep you head elevated 30-45 degrees for at least 8 hours following your myelogram    Drink large amounts of non-alcoholic beverages. Alcohol can further dehydrate you, so we recommend liquids such as :  Juices, water, soda, etc.    DO NOT strain or over-exert yourself for several hours after your myelogram.  Walking and normal activities are acceptable, as long as they are not overdone. Flu-like symptoms may occur, or if you have questions, you may wish to call the Radiology Departments at [phone], Monday - Friday, 7:30 a.m. - 4:30 p.m. After 4:30 p.m. And on weekends call [phone]. Please do not hesitate to ask if there are any further questions we can answer for you or anything else we can do to make you more comfortable.

## 2024-03-05 RX ORDER — SODIUM CHLORIDE, SODIUM LACTATE, POTASSIUM CHLORIDE, CALCIUM CHLORIDE 600; 310; 30; 20 MG/100ML; MG/100ML; MG/100ML; MG/100ML
INJECTION, SOLUTION INTRAVENOUS CONTINUOUS
OUTPATIENT
Start: 2024-03-05

## 2024-03-05 NOTE — DISCHARGE INSTRUCTIONS
with the patient.  If other arrangements are not available then we suggest that you have a second adult to stay in the waiting room.      If you have any other questions regarding your procedure or the day of surgery, please call [phone], or [phone]

## 2024-03-12 ENCOUNTER — HOSPITAL ENCOUNTER (OUTPATIENT)
Dept: PREADMISSION TESTING | Age: 79
Discharge: HOME | End: 2024-03-16
Payer: MEDICARE

## 2024-03-12 VITALS
DIASTOLIC BLOOD PRESSURE: 81 MMHG | RESPIRATION RATE: 19 BRPM | WEIGHT: 145 LBS | HEART RATE: 84 BPM | SYSTOLIC BLOOD PRESSURE: 136 MMHG | HEIGHT: 59 IN | TEMPERATURE: 98.4 F | OXYGEN SATURATION: 96 % | BODY MASS INDEX: 29.23 KG/M2

## 2024-03-12 LAB
ANION GAP SERPL CALCULATED.3IONS-SCNC: 12 MMOL/L (ref 9–16)
BUN SERPL-MCNC: 20 MG/DL (ref 8–23)
CHLORIDE SERPL-SCNC: 101 MMOL/L (ref 98–107)
CO2 SERPL-SCNC: 26 MMOL/L (ref 20–31)
CREAT SERPL-MCNC: 0.9 MG/DL (ref 0.5–0.9)
ERYTHROCYTE [DISTWIDTH] IN BLOOD BY AUTOMATED COUNT: 12.4 % (ref 11.8–14.4)
GFR SERPL CREATININE-BSD FRML MDRD: >60 ML/MIN/1.73M2
GLUCOSE SERPL-MCNC: 93 MG/DL (ref 74–99)
HCT VFR BLD AUTO: 37.6 % (ref 36.3–47.1)
HGB BLD-MCNC: 12.5 G/DL (ref 11.9–15.1)
MCH RBC QN AUTO: 31.8 PG (ref 25.2–33.5)
MCHC RBC AUTO-ENTMCNC: 33.2 G/DL (ref 28.4–34.8)
MCV RBC AUTO: 95.7 FL (ref 82.6–102.9)
NRBC BLD-RTO: 0 PER 100 WBC
PLATELET # BLD AUTO: 171 K/UL (ref 138–453)
PMV BLD AUTO: 9.5 FL (ref 8.1–13.5)
POTASSIUM SERPL-SCNC: 4.6 MMOL/L (ref 3.7–5.3)
RBC # BLD AUTO: 3.93 M/UL (ref 3.95–5.11)
SODIUM SERPL-SCNC: 139 MMOL/L (ref 136–145)
WBC OTHER # BLD: 3.9 K/UL (ref 3.5–11.3)

## 2024-03-12 PROCEDURE — 84520 ASSAY OF UREA NITROGEN: CPT

## 2024-03-12 PROCEDURE — 93005 ELECTROCARDIOGRAM TRACING: CPT | Performed by: STUDENT IN AN ORGANIZED HEALTH CARE EDUCATION/TRAINING PROGRAM

## 2024-03-12 PROCEDURE — 82947 ASSAY GLUCOSE BLOOD QUANT: CPT

## 2024-03-12 PROCEDURE — 80051 ELECTROLYTE PANEL: CPT

## 2024-03-12 PROCEDURE — 36415 COLL VENOUS BLD VENIPUNCTURE: CPT

## 2024-03-12 PROCEDURE — 82565 ASSAY OF CREATININE: CPT

## 2024-03-12 PROCEDURE — 85027 COMPLETE CBC AUTOMATED: CPT

## 2024-03-12 RX ORDER — HYDROCODONE BITARTRATE AND ACETAMINOPHEN 5; 325 MG/1; MG/1
1 TABLET ORAL NIGHTLY
COMMUNITY

## 2024-03-12 RX ORDER — ACETAMINOPHEN, DIPHENHYDRAMINE HYDROCHLORIDE 500; 25 MG/1; MG/1
2 TABLET, FILM COATED ORAL NIGHTLY PRN
COMMUNITY

## 2024-03-12 RX ORDER — LISINOPRIL AND HYDROCHLOROTHIAZIDE 25; 20 MG/1; MG/1
1 TABLET ORAL DAILY
COMMUNITY

## 2024-03-12 RX ORDER — LORATADINE 10 MG/1
10 CAPSULE, LIQUID FILLED ORAL DAILY
COMMUNITY

## 2024-03-12 ASSESSMENT — PAIN DESCRIPTION - ORIENTATION: ORIENTATION: LOWER

## 2024-03-12 ASSESSMENT — PAIN SCALES - GENERAL: PAINLEVEL_OUTOF10: 4

## 2024-03-12 ASSESSMENT — PAIN DESCRIPTION - LOCATION: LOCATION: BACK

## 2024-03-12 NOTE — PROGRESS NOTES
Anesthesia Focused Assessment      STOP-BANG Sleep Apnea Questionnaire    SNORE loudly (heard through closed doors)?   No  TIRED, fatigued, sleepy during daytime?    No  OBSERVED stopping breathing during sleep?   No  High blood PRESSURE being treated?    Yes    BMI over 35?        No  AGE over 50?        Yes  NECK circumference over 16\"?     No  GENDER (male)?       No             Total 2  High risk 5-8  Intermediate risk 3-4  Low risk 0-2    Obstructive Sleep Apnea: denies  If YES, machine used: no     Type 1 DM:   no  T2DM:  no    Coronary Artery Disease:  no  Hypertension:  yes    Active smoker:  no  Drinks alcohol:  weekly  Recreational drugs: no    Dentition: benign    Defib / AICD / Pacemaker: no      Renal Failure/dialysis:  no    Patient was evaluated in PAT & anesthesia guidelines were applied.   NPO guidelines, medication instructions and scheduled arrival time were reviewed with patient.  I advised patient to please contact the surgeon's office, ahead of time if possible, if any new signs or symptoms of illness, infection, rash, etc    Hx of anesthesia complications:  prolonged emergence from general anesthesia.  Family hx of anesthesia complications:  daughter has prolonged emergence from general anesthesia.                                                                                                                     Patient is active, denies cardiac/pulmonary complaint, follows regularly with PCP,  appears younger than stated age.  Case and history discussed with Dr. Guerrero, no further orders.      Will have all results faxed to PCP and inform of upcoming procedure.    KENDALL OLVERA PA-C  3/12/24  2:13 PM

## 2024-03-12 NOTE — H&P
Clear to auscultation bilaterally, no wheezes.  CARDIOVASCULAR: Heart sounds are normal.  Regular rate and rhythm without murmur.   ABDOMEN: non distended.  EXTREMITIES: no edema bilateral lower extremities.    Testing:   EK24  Labs pending: drawn 3/12/2024     IMPRESSIONS:   Lumbar spondylolisthesis   has a past medical history of Arthritis, Bell's palsy, Hearing loss, Hyperlipidemia, Hypertension, Lumbar disc disease, Seasonal allergies, Under care of service provider, and Wears glasses.   PLANS:   ANTERIOR LUMBAR INTERBODY FUSION L4-S1     KENDALL OLVERA PA-C  Electronically signed 3/12/2024 at 2:12 PM

## 2024-03-13 LAB
EKG ATRIAL RATE: 70 BPM
EKG P AXIS: 4 DEGREES
EKG P-R INTERVAL: 172 MS
EKG Q-T INTERVAL: 386 MS
EKG QRS DURATION: 90 MS
EKG QTC CALCULATION (BAZETT): 416 MS
EKG R AXIS: 8 DEGREES
EKG T AXIS: 37 DEGREES
EKG VENTRICULAR RATE: 70 BPM

## 2024-03-29 ENCOUNTER — APPOINTMENT (OUTPATIENT)
Dept: GENERAL RADIOLOGY | Age: 79
DRG: 459 | End: 2024-03-29
Attending: STUDENT IN AN ORGANIZED HEALTH CARE EDUCATION/TRAINING PROGRAM
Payer: MEDICARE

## 2024-03-29 ENCOUNTER — ANESTHESIA (OUTPATIENT)
Dept: OPERATING ROOM | Age: 79
End: 2024-03-29
Payer: MEDICARE

## 2024-03-29 ENCOUNTER — HOSPITAL ENCOUNTER (INPATIENT)
Age: 79
LOS: 11 days | Discharge: NURSING, REHAB OR LONG-TERM CARE | DRG: 459 | End: 2024-04-09
Attending: STUDENT IN AN ORGANIZED HEALTH CARE EDUCATION/TRAINING PROGRAM
Payer: MEDICARE

## 2024-03-29 ENCOUNTER — ANESTHESIA EVENT (OUTPATIENT)
Dept: OPERATING ROOM | Age: 79
End: 2024-03-29
Payer: MEDICARE

## 2024-03-29 ENCOUNTER — HOSPITAL ENCOUNTER (OUTPATIENT)
Dept: CT IMAGING | Age: 79
Discharge: HOME | End: 2024-03-31
Attending: ORTHOPAEDIC SURGERY
Payer: MEDICARE

## 2024-03-29 DIAGNOSIS — I26.99 OTHER ACUTE PULMONARY EMBOLISM WITHOUT ACUTE COR PULMONALE (HCC): ICD-10-CM

## 2024-03-29 DIAGNOSIS — M43.16 SPONDYLOLISTHESIS OF LUMBAR REGION: ICD-10-CM

## 2024-03-29 DIAGNOSIS — R29.90 STROKE-LIKE SYMPTOM: ICD-10-CM

## 2024-03-29 DIAGNOSIS — I63.89 CEREBRAL INFARCTION, WATERSHED DISTRIBUTION, UNILATERAL, ACUTE (HCC): ICD-10-CM

## 2024-03-29 DIAGNOSIS — G89.18 POST-OP PAIN: Primary | ICD-10-CM

## 2024-03-29 DIAGNOSIS — M99.73 CONNECTIVE TISSUE AND DISC STENOSIS OF INTERVERTEBRAL FORAMINA OF LUMBAR REGION: ICD-10-CM

## 2024-03-29 PROBLEM — Z98.890 STATUS POST LUMBAR SPINE OPERATION: Status: ACTIVE | Noted: 2024-03-29

## 2024-03-29 PROBLEM — Z98.1 STATUS POST LUMBAR SPINAL FUSION: Status: ACTIVE | Noted: 2024-03-29

## 2024-03-29 PROCEDURE — 6370000000 HC RX 637 (ALT 250 FOR IP): Performed by: STUDENT IN AN ORGANIZED HEALTH CARE EDUCATION/TRAINING PROGRAM

## 2024-03-29 PROCEDURE — 2709999900 HC NON-CHARGEABLE SUPPLY: Performed by: STUDENT IN AN ORGANIZED HEALTH CARE EDUCATION/TRAINING PROGRAM

## 2024-03-29 PROCEDURE — 2500000003 HC RX 250 WO HCPCS: Performed by: STUDENT IN AN ORGANIZED HEALTH CARE EDUCATION/TRAINING PROGRAM

## 2024-03-29 PROCEDURE — 7100000000 HC PACU RECOVERY - FIRST 15 MIN: Performed by: STUDENT IN AN ORGANIZED HEALTH CARE EDUCATION/TRAINING PROGRAM

## 2024-03-29 PROCEDURE — 0JPT0MZ REMOVAL OF STIMULATOR GENERATOR FROM TRUNK SUBCUTANEOUS TISSUE AND FASCIA, OPEN APPROACH: ICD-10-PCS | Performed by: STUDENT IN AN ORGANIZED HEALTH CARE EDUCATION/TRAINING PROGRAM

## 2024-03-29 PROCEDURE — 0SG30A0 FUSION OF LUMBOSACRAL JOINT WITH INTERBODY FUSION DEVICE, ANTERIOR APPROACH, ANTERIOR COLUMN, OPEN APPROACH: ICD-10-PCS | Performed by: STUDENT IN AN ORGANIZED HEALTH CARE EDUCATION/TRAINING PROGRAM

## 2024-03-29 PROCEDURE — 0ST40ZZ RESECTION OF LUMBOSACRAL DISC, OPEN APPROACH: ICD-10-PCS | Performed by: STUDENT IN AN ORGANIZED HEALTH CARE EDUCATION/TRAINING PROGRAM

## 2024-03-29 PROCEDURE — 3600000014 HC SURGERY LEVEL 4 ADDTL 15MIN: Performed by: STUDENT IN AN ORGANIZED HEALTH CARE EDUCATION/TRAINING PROGRAM

## 2024-03-29 PROCEDURE — 6360000002 HC RX W HCPCS

## 2024-03-29 PROCEDURE — 6360000002 HC RX W HCPCS: Performed by: ANESTHESIOLOGY

## 2024-03-29 PROCEDURE — 8E0W3CZ ROBOTIC ASSISTED PROCEDURE OF TRUNK REGION, PERCUTANEOUS APPROACH: ICD-10-PCS | Performed by: STUDENT IN AN ORGANIZED HEALTH CARE EDUCATION/TRAINING PROGRAM

## 2024-03-29 PROCEDURE — 2580000003 HC RX 258

## 2024-03-29 PROCEDURE — C1713 ANCHOR/SCREW BN/BN,TIS/BN: HCPCS | Performed by: STUDENT IN AN ORGANIZED HEALTH CARE EDUCATION/TRAINING PROGRAM

## 2024-03-29 PROCEDURE — 72131 CT LUMBAR SPINE W/O DYE: CPT

## 2024-03-29 PROCEDURE — 0QP004Z REMOVAL OF INTERNAL FIXATION DEVICE FROM LUMBAR VERTEBRA, OPEN APPROACH: ICD-10-PCS | Performed by: STUDENT IN AN ORGANIZED HEALTH CARE EDUCATION/TRAINING PROGRAM

## 2024-03-29 PROCEDURE — 2500000003 HC RX 250 WO HCPCS

## 2024-03-29 PROCEDURE — 7100000001 HC PACU RECOVERY - ADDTL 15 MIN: Performed by: STUDENT IN AN ORGANIZED HEALTH CARE EDUCATION/TRAINING PROGRAM

## 2024-03-29 PROCEDURE — 0SG00A0 FUSION OF LUMBAR VERTEBRAL JOINT WITH INTERBODY FUSION DEVICE, ANTERIOR APPROACH, ANTERIOR COLUMN, OPEN APPROACH: ICD-10-PCS | Performed by: STUDENT IN AN ORGANIZED HEALTH CARE EDUCATION/TRAINING PROGRAM

## 2024-03-29 PROCEDURE — 3E0U0GB INTRODUCTION OF RECOMBINANT BONE MORPHOGENETIC PROTEIN INTO JOINTS, OPEN APPROACH: ICD-10-PCS | Performed by: STUDENT IN AN ORGANIZED HEALTH CARE EDUCATION/TRAINING PROGRAM

## 2024-03-29 PROCEDURE — C1889 IMPLANT/INSERT DEVICE, NOC: HCPCS | Performed by: STUDENT IN AN ORGANIZED HEALTH CARE EDUCATION/TRAINING PROGRAM

## 2024-03-29 PROCEDURE — 6360000002 HC RX W HCPCS: Performed by: STUDENT IN AN ORGANIZED HEALTH CARE EDUCATION/TRAINING PROGRAM

## 2024-03-29 PROCEDURE — 6370000000 HC RX 637 (ALT 250 FOR IP): Performed by: ANESTHESIOLOGY

## 2024-03-29 PROCEDURE — P9045 ALBUMIN (HUMAN), 5%, 250 ML: HCPCS

## 2024-03-29 PROCEDURE — 2720000010 HC SURG SUPPLY STERILE: Performed by: STUDENT IN AN ORGANIZED HEALTH CARE EDUCATION/TRAINING PROGRAM

## 2024-03-29 PROCEDURE — 3700000000 HC ANESTHESIA ATTENDED CARE: Performed by: STUDENT IN AN ORGANIZED HEALTH CARE EDUCATION/TRAINING PROGRAM

## 2024-03-29 PROCEDURE — 2500000003 HC RX 250 WO HCPCS: Performed by: NURSE ANESTHETIST, CERTIFIED REGISTERED

## 2024-03-29 PROCEDURE — 6370000000 HC RX 637 (ALT 250 FOR IP)

## 2024-03-29 PROCEDURE — 0ST20ZZ RESECTION OF LUMBAR VERTEBRAL DISC, OPEN APPROACH: ICD-10-PCS | Performed by: STUDENT IN AN ORGANIZED HEALTH CARE EDUCATION/TRAINING PROGRAM

## 2024-03-29 PROCEDURE — 1200000000 HC SEMI PRIVATE

## 2024-03-29 PROCEDURE — 00PU0MZ REMOVAL OF NEUROSTIMULATOR LEAD FROM SPINAL CANAL, OPEN APPROACH: ICD-10-PCS | Performed by: STUDENT IN AN ORGANIZED HEALTH CARE EDUCATION/TRAINING PROGRAM

## 2024-03-29 PROCEDURE — 2580000003 HC RX 258: Performed by: STUDENT IN AN ORGANIZED HEALTH CARE EDUCATION/TRAINING PROGRAM

## 2024-03-29 PROCEDURE — A4217 STERILE WATER/SALINE, 500 ML: HCPCS | Performed by: STUDENT IN AN ORGANIZED HEALTH CARE EDUCATION/TRAINING PROGRAM

## 2024-03-29 PROCEDURE — 3700000001 HC ADD 15 MINUTES (ANESTHESIA): Performed by: STUDENT IN AN ORGANIZED HEALTH CARE EDUCATION/TRAINING PROGRAM

## 2024-03-29 PROCEDURE — 3600000004 HC SURGERY LEVEL 4 BASE: Performed by: STUDENT IN AN ORGANIZED HEALTH CARE EDUCATION/TRAINING PROGRAM

## 2024-03-29 DEVICE — CLIP INT L ORNG TI TRNSVRS GRV CHEVRON SHP W/ PRECIS TIP TO: Type: IMPLANTABLE DEVICE | Site: ABDOMEN | Status: FUNCTIONAL

## 2024-03-29 DEVICE — ROD 641003070 70MM CAPPED ROD 4.75MM CCM
Type: IMPLANTABLE DEVICE | Site: SPINE LUMBAR | Status: FUNCTIONAL
Brand: CD HORIZON® SOLERA® SPINAL SYSTEM

## 2024-03-29 DEVICE — SET SCR SPNL DIA4.75MM POST THORLUM SACR TI PERC APPRCH CDH: Type: IMPLANTABLE DEVICE | Site: SPINE LUMBAR | Status: FUNCTIONAL

## 2024-03-29 DEVICE — SEALANT HEMOSTATIC FLOSEAL W/RECOTHROM 5 ML 75X13.25 IN 10CC: Type: IMPLANTABLE DEVICE | Site: SPINE LUMBAR | Status: FUNCTIONAL

## 2024-03-29 DEVICE — DBM T43105 5CC GRAFTON PUTTY
Type: IMPLANTABLE DEVICE | Site: SPINE LUMBAR | Status: FUNCTIONAL
Brand: GRAFTON®AND GRAFTON PLUS®DEMINERALIZED BONE MATRIX (DBM)

## 2024-03-29 DEVICE — BONE SCREW 4675030 LS 5.0X30MM
Type: IMPLANTABLE DEVICE | Site: SPINE LUMBAR | Status: FUNCTIONAL
Brand: ANTERALIGN SPINAL SYSTEM WITH TITAN NANOLOCK SURFACE TECHNOLOGY

## 2024-03-29 DEVICE — BONE GRAFT KIT 7510100 INFUSE X SMALL
Type: IMPLANTABLE DEVICE | Site: SPINE LUMBAR | Status: FUNCTIONAL
Brand: INFUSE® BONE GRAFT

## 2024-03-29 DEVICE — IMPLANTABLE DEVICE: Type: IMPLANTABLE DEVICE | Site: SPINE LUMBAR | Status: FUNCTIONAL

## 2024-03-29 RX ORDER — FENTANYL CITRATE 50 UG/ML
25 INJECTION, SOLUTION INTRAMUSCULAR; INTRAVENOUS EVERY 5 MIN PRN
Status: COMPLETED | OUTPATIENT
Start: 2024-03-29 | End: 2024-03-29

## 2024-03-29 RX ORDER — OXYCODONE HYDROCHLORIDE AND ACETAMINOPHEN 5; 325 MG/1; MG/1
1 TABLET ORAL EVERY 6 HOURS PRN
Qty: 28 TABLET | Refills: 0 | Status: SHIPPED | OUTPATIENT
Start: 2024-03-29 | End: 2024-04-05

## 2024-03-29 RX ORDER — DEXAMETHASONE SODIUM PHOSPHATE 10 MG/ML
INJECTION INTRAMUSCULAR; INTRAVENOUS PRN
Status: DISCONTINUED | OUTPATIENT
Start: 2024-03-29 | End: 2024-03-29 | Stop reason: SDUPTHER

## 2024-03-29 RX ORDER — OXYCODONE HYDROCHLORIDE AND ACETAMINOPHEN 5; 325 MG/1; MG/1
1 TABLET ORAL EVERY 6 HOURS PRN
Qty: 28 TABLET | Refills: 0 | Status: SHIPPED | OUTPATIENT
Start: 2024-03-29 | End: 2024-03-29

## 2024-03-29 RX ORDER — SODIUM CHLORIDE 0.9 % (FLUSH) 0.9 %
5-40 SYRINGE (ML) INJECTION EVERY 12 HOURS SCHEDULED
Status: DISCONTINUED | OUTPATIENT
Start: 2024-03-29 | End: 2024-03-31

## 2024-03-29 RX ORDER — OXYCODONE HYDROCHLORIDE 5 MG/1
5 TABLET ORAL PRN
Status: COMPLETED | OUTPATIENT
Start: 2024-03-29 | End: 2024-03-29

## 2024-03-29 RX ORDER — HYDRALAZINE HYDROCHLORIDE 20 MG/ML
10 INJECTION INTRAMUSCULAR; INTRAVENOUS
Status: DISCONTINUED | OUTPATIENT
Start: 2024-03-29 | End: 2024-03-29 | Stop reason: HOSPADM

## 2024-03-29 RX ORDER — LIDOCAINE HYDROCHLORIDE 10 MG/ML
INJECTION, SOLUTION EPIDURAL; INFILTRATION; INTRACAUDAL; PERINEURAL PRN
Status: DISCONTINUED | OUTPATIENT
Start: 2024-03-29 | End: 2024-03-29 | Stop reason: SDUPTHER

## 2024-03-29 RX ORDER — SODIUM CHLORIDE 0.9 % (FLUSH) 0.9 %
5-40 SYRINGE (ML) INJECTION EVERY 12 HOURS SCHEDULED
Status: DISCONTINUED | OUTPATIENT
Start: 2024-03-29 | End: 2024-03-29 | Stop reason: HOSPADM

## 2024-03-29 RX ORDER — MAGNESIUM HYDROXIDE 1200 MG/15ML
LIQUID ORAL CONTINUOUS PRN
Status: COMPLETED | OUTPATIENT
Start: 2024-03-29 | End: 2024-03-29

## 2024-03-29 RX ORDER — ALBUMIN, HUMAN INJ 5% 5 %
SOLUTION INTRAVENOUS PRN
Status: DISCONTINUED | OUTPATIENT
Start: 2024-03-29 | End: 2024-03-29 | Stop reason: SDUPTHER

## 2024-03-29 RX ORDER — PROPOFOL 10 MG/ML
INJECTION, EMULSION INTRAVENOUS PRN
Status: DISCONTINUED | OUTPATIENT
Start: 2024-03-29 | End: 2024-03-29 | Stop reason: SDUPTHER

## 2024-03-29 RX ORDER — LABETALOL HYDROCHLORIDE 5 MG/ML
INJECTION, SOLUTION INTRAVENOUS PRN
Status: DISCONTINUED | OUTPATIENT
Start: 2024-03-29 | End: 2024-03-29 | Stop reason: SDUPTHER

## 2024-03-29 RX ORDER — SENNOSIDES 8.8 MG/5ML
5 LIQUID ORAL 2 TIMES DAILY PRN
Status: DISCONTINUED | OUTPATIENT
Start: 2024-03-29 | End: 2024-03-30

## 2024-03-29 RX ORDER — ONDANSETRON 4 MG/1
4 TABLET, ORALLY DISINTEGRATING ORAL EVERY 8 HOURS PRN
Status: DISCONTINUED | OUTPATIENT
Start: 2024-03-29 | End: 2024-03-31 | Stop reason: SDUPTHER

## 2024-03-29 RX ORDER — ACETAMINOPHEN 325 MG/1
650 TABLET ORAL EVERY 6 HOURS
Status: DISCONTINUED | OUTPATIENT
Start: 2024-03-29 | End: 2024-04-09 | Stop reason: HOSPADM

## 2024-03-29 RX ORDER — DIPHENHYDRAMINE HYDROCHLORIDE 50 MG/ML
12.5 INJECTION INTRAMUSCULAR; INTRAVENOUS
Status: DISCONTINUED | OUTPATIENT
Start: 2024-03-29 | End: 2024-03-29 | Stop reason: HOSPADM

## 2024-03-29 RX ORDER — TIZANIDINE 2 MG/1
2 TABLET ORAL EVERY 6 HOURS PRN
Status: DISCONTINUED | OUTPATIENT
Start: 2024-03-29 | End: 2024-04-09 | Stop reason: HOSPADM

## 2024-03-29 RX ORDER — PROCHLORPERAZINE EDISYLATE 5 MG/ML
5 INJECTION INTRAMUSCULAR; INTRAVENOUS
Status: DISCONTINUED | OUTPATIENT
Start: 2024-03-29 | End: 2024-03-29 | Stop reason: HOSPADM

## 2024-03-29 RX ORDER — SODIUM CHLORIDE 0.9 % (FLUSH) 0.9 %
5-40 SYRINGE (ML) INJECTION PRN
Status: DISCONTINUED | OUTPATIENT
Start: 2024-03-29 | End: 2024-03-29 | Stop reason: HOSPADM

## 2024-03-29 RX ORDER — SODIUM CHLORIDE, SODIUM LACTATE, POTASSIUM CHLORIDE, CALCIUM CHLORIDE 600; 310; 30; 20 MG/100ML; MG/100ML; MG/100ML; MG/100ML
INJECTION, SOLUTION INTRAVENOUS CONTINUOUS PRN
Status: DISCONTINUED | OUTPATIENT
Start: 2024-03-29 | End: 2024-03-29 | Stop reason: SDUPTHER

## 2024-03-29 RX ORDER — SODIUM CHLORIDE 0.9 % (FLUSH) 0.9 %
5-40 SYRINGE (ML) INJECTION PRN
Status: DISCONTINUED | OUTPATIENT
Start: 2024-03-29 | End: 2024-03-31

## 2024-03-29 RX ORDER — ONDANSETRON 2 MG/ML
4 INJECTION INTRAMUSCULAR; INTRAVENOUS EVERY 6 HOURS PRN
Status: DISCONTINUED | OUTPATIENT
Start: 2024-03-29 | End: 2024-03-31 | Stop reason: SDUPTHER

## 2024-03-29 RX ORDER — SODIUM CHLORIDE 9 MG/ML
INJECTION, SOLUTION INTRAVENOUS CONTINUOUS
Status: DISCONTINUED | OUTPATIENT
Start: 2024-03-29 | End: 2024-03-31

## 2024-03-29 RX ORDER — FENTANYL CITRATE 50 UG/ML
INJECTION, SOLUTION INTRAMUSCULAR; INTRAVENOUS PRN
Status: DISCONTINUED | OUTPATIENT
Start: 2024-03-29 | End: 2024-03-29 | Stop reason: SDUPTHER

## 2024-03-29 RX ORDER — ROCURONIUM BROMIDE 10 MG/ML
INJECTION, SOLUTION INTRAVENOUS PRN
Status: DISCONTINUED | OUTPATIENT
Start: 2024-03-29 | End: 2024-03-29 | Stop reason: SDUPTHER

## 2024-03-29 RX ORDER — LABETALOL HYDROCHLORIDE 5 MG/ML
10 INJECTION, SOLUTION INTRAVENOUS
Status: DISCONTINUED | OUTPATIENT
Start: 2024-03-29 | End: 2024-03-29 | Stop reason: HOSPADM

## 2024-03-29 RX ORDER — LORAZEPAM 2 MG/ML
0.5 INJECTION INTRAMUSCULAR
Status: DISCONTINUED | OUTPATIENT
Start: 2024-03-29 | End: 2024-03-29 | Stop reason: HOSPADM

## 2024-03-29 RX ORDER — NALOXONE HYDROCHLORIDE 0.4 MG/ML
INJECTION, SOLUTION INTRAMUSCULAR; INTRAVENOUS; SUBCUTANEOUS PRN
Status: DISCONTINUED | OUTPATIENT
Start: 2024-03-29 | End: 2024-03-29 | Stop reason: HOSPADM

## 2024-03-29 RX ORDER — SODIUM CHLORIDE 9 MG/ML
INJECTION, SOLUTION INTRAVENOUS PRN
Status: DISCONTINUED | OUTPATIENT
Start: 2024-03-29 | End: 2024-03-29 | Stop reason: HOSPADM

## 2024-03-29 RX ORDER — SODIUM CHLORIDE 9 MG/ML
INJECTION, SOLUTION INTRAVENOUS PRN
Status: DISCONTINUED | OUTPATIENT
Start: 2024-03-29 | End: 2024-03-31

## 2024-03-29 RX ORDER — OXYCODONE HYDROCHLORIDE 5 MG/1
10 TABLET ORAL EVERY 4 HOURS PRN
Status: DISCONTINUED | OUTPATIENT
Start: 2024-03-29 | End: 2024-04-09 | Stop reason: HOSPADM

## 2024-03-29 RX ORDER — BUPIVACAINE HYDROCHLORIDE AND EPINEPHRINE 5; 5 MG/ML; UG/ML
INJECTION, SOLUTION EPIDURAL; INTRACAUDAL; PERINEURAL PRN
Status: DISCONTINUED | OUTPATIENT
Start: 2024-03-29 | End: 2024-03-29 | Stop reason: ALTCHOICE

## 2024-03-29 RX ORDER — OXYCODONE HYDROCHLORIDE 5 MG/1
10 TABLET ORAL PRN
Status: COMPLETED | OUTPATIENT
Start: 2024-03-29 | End: 2024-03-29

## 2024-03-29 RX ORDER — EPHEDRINE SULFATE/0.9% NACL/PF 25 MG/5 ML
SYRINGE (ML) INTRAVENOUS PRN
Status: DISCONTINUED | OUTPATIENT
Start: 2024-03-29 | End: 2024-03-29 | Stop reason: SDUPTHER

## 2024-03-29 RX ORDER — ONDANSETRON 2 MG/ML
INJECTION INTRAMUSCULAR; INTRAVENOUS PRN
Status: DISCONTINUED | OUTPATIENT
Start: 2024-03-29 | End: 2024-03-29 | Stop reason: SDUPTHER

## 2024-03-29 RX ORDER — OXYCODONE HYDROCHLORIDE 5 MG/1
5 TABLET ORAL EVERY 4 HOURS PRN
Status: DISCONTINUED | OUTPATIENT
Start: 2024-03-29 | End: 2024-04-09 | Stop reason: HOSPADM

## 2024-03-29 RX ORDER — VANCOMYCIN HYDROCHLORIDE 1 G/20ML
INJECTION, POWDER, LYOPHILIZED, FOR SOLUTION INTRAVENOUS PRN
Status: DISCONTINUED | OUTPATIENT
Start: 2024-03-29 | End: 2024-03-29 | Stop reason: ALTCHOICE

## 2024-03-29 RX ORDER — DROPERIDOL 2.5 MG/ML
0.62 INJECTION, SOLUTION INTRAMUSCULAR; INTRAVENOUS
Status: DISCONTINUED | OUTPATIENT
Start: 2024-03-29 | End: 2024-03-29 | Stop reason: HOSPADM

## 2024-03-29 RX ADMIN — ROCURONIUM BROMIDE 50 MG: 10 INJECTION, SOLUTION INTRAVENOUS at 12:27

## 2024-03-29 RX ADMIN — PHENYLEPHRINE HYDROCHLORIDE 100 MCG: 10 INJECTION INTRAVENOUS at 18:12

## 2024-03-29 RX ADMIN — PHENYLEPHRINE HYDROCHLORIDE 100 MCG: 10 INJECTION INTRAVENOUS at 15:29

## 2024-03-29 RX ADMIN — ROCURONIUM BROMIDE 30 MG: 10 INJECTION, SOLUTION INTRAVENOUS at 12:50

## 2024-03-29 RX ADMIN — TIZANIDINE 2 MG: 2 TABLET ORAL at 20:51

## 2024-03-29 RX ADMIN — ROCURONIUM BROMIDE 20 MG: 10 INJECTION, SOLUTION INTRAVENOUS at 14:25

## 2024-03-29 RX ADMIN — EPHEDRINE SULFATE 5 MG: 5 INJECTION INTRAVENOUS at 16:30

## 2024-03-29 RX ADMIN — EPHEDRINE SULFATE 10 MG: 5 INJECTION INTRAVENOUS at 17:57

## 2024-03-29 RX ADMIN — PHENYLEPHRINE HYDROCHLORIDE 100 MCG: 10 INJECTION INTRAVENOUS at 16:45

## 2024-03-29 RX ADMIN — EPHEDRINE SULFATE 5 MG: 5 INJECTION INTRAVENOUS at 18:02

## 2024-03-29 RX ADMIN — FENTANYL CITRATE 25 MCG: 50 INJECTION INTRAMUSCULAR; INTRAVENOUS at 19:54

## 2024-03-29 RX ADMIN — SODIUM CHLORIDE, POTASSIUM CHLORIDE, SODIUM LACTATE AND CALCIUM CHLORIDE: 600; 310; 30; 20 INJECTION, SOLUTION INTRAVENOUS at 12:14

## 2024-03-29 RX ADMIN — SODIUM CHLORIDE, SODIUM LACTATE, POTASSIUM CHLORIDE, CALCIUM CHLORIDE: 600; 310; 30; 20 INJECTION, SOLUTION INTRAVENOUS at 12:41

## 2024-03-29 RX ADMIN — SODIUM CHLORIDE, SODIUM LACTATE, POTASSIUM CHLORIDE, CALCIUM CHLORIDE: 600; 310; 30; 20 INJECTION, SOLUTION INTRAVENOUS at 15:20

## 2024-03-29 RX ADMIN — LIDOCAINE HYDROCHLORIDE 50 MG: 10 INJECTION, SOLUTION EPIDURAL; INFILTRATION; INTRACAUDAL; PERINEURAL at 12:27

## 2024-03-29 RX ADMIN — Medication 2 G: at 16:19

## 2024-03-29 RX ADMIN — OXYCODONE 10 MG: 5 TABLET ORAL at 20:10

## 2024-03-29 RX ADMIN — ONDANSETRON 4 MG: 2 INJECTION INTRAMUSCULAR; INTRAVENOUS at 18:41

## 2024-03-29 RX ADMIN — FENTANYL CITRATE 50 MCG: 0.05 INJECTION, SOLUTION INTRAMUSCULAR; INTRAVENOUS at 12:57

## 2024-03-29 RX ADMIN — Medication 2 G: at 12:30

## 2024-03-29 RX ADMIN — ACETAMINOPHEN 325MG 650 MG: 325 TABLET ORAL at 20:51

## 2024-03-29 RX ADMIN — EPHEDRINE SULFATE 5 MG: 5 INJECTION INTRAVENOUS at 14:21

## 2024-03-29 RX ADMIN — PHENYLEPHRINE HYDROCHLORIDE 100 MCG: 10 INJECTION INTRAVENOUS at 16:25

## 2024-03-29 RX ADMIN — SODIUM CHLORIDE: 9 INJECTION, SOLUTION INTRAVENOUS at 21:01

## 2024-03-29 RX ADMIN — PHENYLEPHRINE HYDROCHLORIDE 100 MCG: 10 INJECTION INTRAVENOUS at 14:39

## 2024-03-29 RX ADMIN — PHENYLEPHRINE HYDROCHLORIDE 100 MCG: 10 INJECTION INTRAVENOUS at 17:48

## 2024-03-29 RX ADMIN — PHENYLEPHRINE HYDROCHLORIDE 100 MCG: 10 INJECTION INTRAVENOUS at 15:54

## 2024-03-29 RX ADMIN — ALBUMIN (HUMAN) 12.5 G: 12.5 INJECTION, SOLUTION INTRAVENOUS at 18:19

## 2024-03-29 RX ADMIN — PHENYLEPHRINE HYDROCHLORIDE 100 MCG: 10 INJECTION INTRAVENOUS at 16:37

## 2024-03-29 RX ADMIN — HYDROMORPHONE HYDROCHLORIDE 0.5 MG: 1 INJECTION, SOLUTION INTRAMUSCULAR; INTRAVENOUS; SUBCUTANEOUS at 20:51

## 2024-03-29 RX ADMIN — FENTANYL CITRATE 25 MCG: 50 INJECTION INTRAMUSCULAR; INTRAVENOUS at 19:49

## 2024-03-29 RX ADMIN — Medication 5 MG: at 13:27

## 2024-03-29 RX ADMIN — SUGAMMADEX 400 MG: 100 INJECTION, SOLUTION INTRAVENOUS at 19:03

## 2024-03-29 RX ADMIN — ROCURONIUM BROMIDE 20 MG: 10 INJECTION, SOLUTION INTRAVENOUS at 15:19

## 2024-03-29 RX ADMIN — EPHEDRINE SULFATE 10 MG: 5 INJECTION INTRAVENOUS at 17:00

## 2024-03-29 RX ADMIN — ROCURONIUM BROMIDE 10 MG: 10 INJECTION, SOLUTION INTRAVENOUS at 17:58

## 2024-03-29 RX ADMIN — DEXAMETHASONE SODIUM PHOSPHATE 10 MG: 10 INJECTION INTRAMUSCULAR; INTRAVENOUS at 12:50

## 2024-03-29 RX ADMIN — SODIUM CHLORIDE, SODIUM LACTATE, POTASSIUM CHLORIDE, CALCIUM CHLORIDE: 600; 310; 30; 20 INJECTION, SOLUTION INTRAVENOUS at 17:17

## 2024-03-29 RX ADMIN — EPHEDRINE SULFATE 10 MG: 5 INJECTION INTRAVENOUS at 13:52

## 2024-03-29 RX ADMIN — PROPOFOL 100 MG: 10 INJECTION, EMULSION INTRAVENOUS at 12:27

## 2024-03-29 RX ADMIN — FENTANYL CITRATE 50 MCG: 0.05 INJECTION, SOLUTION INTRAMUSCULAR; INTRAVENOUS at 16:19

## 2024-03-29 RX ADMIN — PHENYLEPHRINE HYDROCHLORIDE 100 MCG: 10 INJECTION INTRAVENOUS at 16:51

## 2024-03-29 RX ADMIN — PHENYLEPHRINE HYDROCHLORIDE 100 MCG: 10 INJECTION INTRAVENOUS at 15:21

## 2024-03-29 RX ADMIN — FENTANYL CITRATE 100 MCG: 0.05 INJECTION, SOLUTION INTRAMUSCULAR; INTRAVENOUS at 12:27

## 2024-03-29 RX ADMIN — EPHEDRINE SULFATE 5 MG: 5 INJECTION INTRAVENOUS at 16:10

## 2024-03-29 RX ADMIN — ROCURONIUM BROMIDE 20 MG: 10 INJECTION, SOLUTION INTRAVENOUS at 16:36

## 2024-03-29 RX ADMIN — PHENYLEPHRINE HYDROCHLORIDE 100 MCG: 10 INJECTION INTRAVENOUS at 18:03

## 2024-03-29 RX ADMIN — PHENYLEPHRINE HYDROCHLORIDE 100 MCG: 10 INJECTION INTRAVENOUS at 14:27

## 2024-03-29 RX ADMIN — PHENYLEPHRINE HYDROCHLORIDE 100 MCG: 10 INJECTION INTRAVENOUS at 16:06

## 2024-03-29 RX ADMIN — PHENYLEPHRINE HYDROCHLORIDE 100 MCG: 10 INJECTION INTRAVENOUS at 17:22

## 2024-03-29 RX ADMIN — PHENYLEPHRINE HYDROCHLORIDE 100 MCG: 10 INJECTION INTRAVENOUS at 17:08

## 2024-03-29 RX ADMIN — OXYCODONE 10 MG: 5 TABLET ORAL at 23:57

## 2024-03-29 RX ADMIN — Medication 2000 MG: at 23:57

## 2024-03-29 RX ADMIN — ALBUMIN (HUMAN) 12.5 G: 12.5 INJECTION, SOLUTION INTRAVENOUS at 18:04

## 2024-03-29 RX ADMIN — FENTANYL CITRATE 50 MCG: 0.05 INJECTION, SOLUTION INTRAMUSCULAR; INTRAVENOUS at 18:57

## 2024-03-29 ASSESSMENT — PAIN DESCRIPTION - DESCRIPTORS
DESCRIPTORS: DISCOMFORT
DESCRIPTORS: TENDER;SORE;DULL
DESCRIPTORS: ACHING;TENDER;SORE
DESCRIPTORS: ACHING;TENDER;SORE

## 2024-03-29 ASSESSMENT — PAIN DESCRIPTION - PAIN TYPE
TYPE: SURGICAL PAIN

## 2024-03-29 ASSESSMENT — PAIN DESCRIPTION - ORIENTATION
ORIENTATION: LOWER

## 2024-03-29 ASSESSMENT — PAIN - FUNCTIONAL ASSESSMENT
PAIN_FUNCTIONAL_ASSESSMENT: ACTIVITIES ARE NOT PREVENTED
PAIN_FUNCTIONAL_ASSESSMENT: PREVENTS OR INTERFERES WITH MANY ACTIVE NOT PASSIVE ACTIVITIES
PAIN_FUNCTIONAL_ASSESSMENT: ACTIVITIES ARE NOT PREVENTED
PAIN_FUNCTIONAL_ASSESSMENT: ACTIVITIES ARE NOT PREVENTED

## 2024-03-29 ASSESSMENT — PAIN DESCRIPTION - LOCATION
LOCATION: BACK

## 2024-03-29 ASSESSMENT — PAIN SCALES - GENERAL
PAINLEVEL_OUTOF10: 10
PAINLEVEL_OUTOF10: 8
PAINLEVEL_OUTOF10: 10
PAINLEVEL_OUTOF10: 7
PAINLEVEL_OUTOF10: 8
PAINLEVEL_OUTOF10: 5
PAINLEVEL_OUTOF10: 0
PAINLEVEL_OUTOF10: 9

## 2024-03-29 ASSESSMENT — PAIN DESCRIPTION - FREQUENCY
FREQUENCY: INTERMITTENT
FREQUENCY: CONTINUOUS
FREQUENCY: INTERMITTENT
FREQUENCY: INTERMITTENT

## 2024-03-29 ASSESSMENT — PAIN DESCRIPTION - ONSET
ONSET: ON-GOING
ONSET: ON-GOING

## 2024-03-29 NOTE — PROGRESS NOTES
PRE-OP ASSESS PERFORMED:    PATIENT STATES SHE ALWAYS HAS RINGING IN THE EARS, AND HAS RIGHT SIDED FACIAL DEFICIT. TONGUE MIDLINE,  STRENGTH EQUAL BILATERAL, PLANTAR FLEXION STRONG AND EQUAL.   Prescription for zofran given.

## 2024-03-29 NOTE — H&P
Pt Name: Annie Beltran  MRN: [id number]  YOB: 1945  Date of evaluation: 3/29/2024    I have reviewed the patient's history and physical examination completed in pre-admission testing.    Changes to history or on examination, if any, are as follows:  none    KENDALL OLVERA PA-C  3/29/24  11:11 AM      History and Physical     Pt Name: Annie Beltran  MRN: [id number]  YOB: 1945  Date of evaluation: 3/12/2024     SUBJECTIVE:   History of Chief Complaint:    Patient presents for PAT appointment.  She has a history of lower back pain, s/p lumbar surgery in the 1970s and then a revision following this.  She has underwent pain management procedures and spinal cord stimulator placement in the past.  She continues with pain, has undergone myelogram.  She has been scheduled for ANTERIOR LUMBAR INTERBODY FUSION L4-S1.   Past Medical History    has a past medical history of Arthritis, Bell's palsy, Hearing loss, Hyperlipidemia, Hypertension, Lumbar disc disease, Seasonal allergies, Under care of service provider, and Wears glasses.  Past Surgical History   has a past surgical history that includes  section; Colonoscopy (); Appendectomy; Pain management procedure (Right, 2016); Pain management procedure (Right, 2016); Pain management procedure (2017); Nerve Block Lumb Facet Level 1 Bilateral (Right, 2017); Humerus Closed Reduction (Right, 2021); back surgery; Pain management procedure (2020); Shoulder Arthroplasty (Right, 10/18/2021); and Spinal Cord Stimulator Surgery ().  Medications  Home Medications           Prior to Admission medications    Medication Sig Start Date End Date Taking? Authorizing Provider   loratadine (CLARITIN) 10 MG capsule Take 1 capsule by mouth daily     Yes Geronimo Brwoer MD   lisinopril-hydroCHLOROthiazide (PRINZIDE;ZESTORETIC) 20-25 MG per tablet Take 1 tablet by mouth daily     Yes Geronimo Brower MD

## 2024-03-29 NOTE — ANESTHESIA PRE PROCEDURE
Department of Anesthesiology  Preprocedure Note       Name:  Annie Beltran   Age:  79 y.o.  :  1945                                          MRN:  [id number]         Date:  3/29/2024      Surgeon: Surgeon(s):  Earl Verma, Earl Lazo, Chandu Castillo DO    Procedure: Procedure(s):  ANTERIOR LUMBAR INTERBODY FUSION L4-S1  MIKE ROBOTIC POSTERIOR PERCUTANEOUS INSTRUMENTATION L4-S1  ([employer]ON[employer], MIKE ROBOT, C-ARM, DAIANA TABLE, *CELL-SAVER*)  ANTERIOR APPROACH    Medications prior to admission:   Prior to Admission medications    Medication Sig Start Date End Date Taking? Authorizing Provider   oxyCODONE-acetaminophen (PERCOCET) 5-325 MG per tablet Take 1 tablet by mouth every 6 hours as needed for Pain for up to 7 days. Intended supply: 7 days. Take lowest dose possible to manage pain Max Daily Amount: 4 tablets 3/29/24 4/5/24 Yes Alberto Plasencia DO   loratadine (CLARITIN) 10 MG capsule Take 1 capsule by mouth daily    Geroniom Brower MD   lisinopril-hydroCHLOROthiazide (PRINZIDE;ZESTORETIC) 20-25 MG per tablet Take 1 tablet by mouth daily    Geronimo Brower MD   HYDROcodone-acetaminophen (NORCO) 5-325 MG per tablet Take 1 tablet by mouth nightly. Max Daily Amount: 1 tablet    Geronimo Brower MD   diphenhydrAMINE-APAP, sleep, (ACETAMINOPHEN PM)  MG tablet Take 2 tablets by mouth nightly as needed for Sleep    Geronimo Brower MD   Ketotifen Fumarate (ALLERGY EYE DROPS OP) Apply 1 drop to eye nightly    Geronimo Brower MD   Azelastine HCl (ASTEPRO NA) 2 drops by Nasal route daily    Geronimo Brower MD   Acetaminophen (TYLENOL 8 HOUR ARTHRITIS PAIN PO) Take by mouth    Geronimo Brower MD   simvastatin (ZOCOR) 20 MG tablet Take 1 tablet by mouth nightly    Geronimo Brower MD       Current medications:    No current facility-administered medications for this encounter.       Allergies:    Allergies   Allergen Reactions

## 2024-03-29 NOTE — OP NOTE
Operative Note      Patient: Annie Beltran  YOB: 1945  MRN: [id number]    Date of Procedure: 3/29/2024    Pre-Op Diagnosis Codes:     * Spondylolisthesis of lumbar region [M43.16]    Post-Op Diagnosis: Same       Procedure(s):  ANTERIOR LUMBAR INTERBODY FUSION L4-S1  MIKE ROBOTIC POSTERIOR PERCUTANEOUS INSTRUMENTATION L4-S1, SPINAL CORD STIMULATOR BATTERY REMOVAL  ANTERIOR APPROACH    Surgeon(s):  Earl Verma, Earl Lazo, Chandu Castillo,     Assistant:   * No surgical staff found *    Anesthesia: General    Estimated Blood Loss (mL): 675 ml  with 375 returned with cell saver    Complications: None    Specimens:   * No specimens in log *    Implants:  Implant Name Type Inv. Item Serial No.  Lot No. LRB No. Used Action   KIT BNE GRFT XSM 1.4CC RHBMP-2 ABSRB CLLGN SPNG INFUSE - [id number]  KIT BNE GRFT XSM 1.4CC RHBMP-2 ABSRB CLLGN SPNG INFUSE  [employer] SPINALGRAFT TECH-WD [id number] N/A 1 Implanted   GRAFT BNE SUB 5CC DEMIN BNE MTRX PTTY GRFTON - [id number]  GRAFT BNE SUB 5CC DEMIN BNE MTRX PTTY GRFTON [id number] [employer] SPINALGRAFT TECH-WD  N/A 1 Implanted   SCREW SPNL LCK 5X20 MM TITAN NANOLOCK STRL ANTERALIGN LTX - [id number]  SCREW SPNL LCK 5X20 MM TITAN NANOLOCK STRL ANTERALIGN LTX  [employer] SOFAMOR DANEK-WD [id number] N/A 2 Implanted   SCREW SPNL LCK 5X30 MM TITAN NANOLOCK STRL ANTERALIGN LTX - [id number]  SCREW SPNL LCK 5X30 MM TITAN NANOLOCK STRL ANTERALIGN LTX  [employer] SOFAMOR DANEK-WD [id number] N/A 1 Implanted   SCREW SPNL LCK 5X20 MM TITAN NANOLOCK STRL ANTERALIGN LTX - [id number]  SCREW SPNL LCK 5X20 MM TITAN NANOLOCK STRL ANTERALIGN LTX  [employer] SOFAMOR DANEK-WD [id number] N/A 1 Implanted   KIT BNE GRFT XSM 1.4CC RHBMP-2 ABSRB CLLGN SPNG INFUSE - [id number]  KIT BNE GRFT XSM 1.4CC RHBMP-2 ABSRB CLLGN SPNG INFUSE  [employer] SPINALGRAFT TECH- [id number] N/A 1 Implanted   ANTERALIGN SPINAL SYSTEM TITAN NANLCOK LS SPACER    [employer] [facility]-WD

## 2024-03-29 NOTE — ANESTHESIA PRE PROCEDURE
Department of Anesthesiology  Preprocedure Note       Name:  Annie Beltran   Age:  79 y.o.  :  1945                                          MRN:  [id number]         Date:  3/29/2024      Surgeon: Surgeon(s):  Earl Verma, Earl Lazo, Chandu Castillo DO    Procedure: Procedure(s):  ANTERIOR LUMBAR INTERBODY FUSION L4-S1  MIKE ROBOTIC POSTERIOR PERCUTANEOUS INSTRUMENTATION L4-S1  (MEDTRON[employer], MIKE ROBOT, C-ARM, DAIANA TABLE, *CELL-SAVER*)  ANTERIOR APPROACH    Medications prior to admission:   Prior to Admission medications    Medication Sig Start Date End Date Taking? Authorizing Provider   oxyCODONE-acetaminophen (PERCOCET) 5-325 MG per tablet Take 1 tablet by mouth every 6 hours as needed for Pain for up to 7 days. Intended supply: 7 days. Take lowest dose possible to manage pain Max Daily Amount: 4 tablets 3/29/24 4/5/24 Yes Alberto Plasencia DO   loratadine (CLARITIN) 10 MG capsule Take 1 capsule by mouth daily    Geronimo Brower MD   lisinopril-hydroCHLOROthiazide (PRINZIDE;ZESTORETIC) 20-25 MG per tablet Take 1 tablet by mouth daily    Geronimo Brower MD   HYDROcodone-acetaminophen (NORCO) 5-325 MG per tablet Take 1 tablet by mouth nightly. Max Daily Amount: 1 tablet    Geronimo Brower MD   diphenhydrAMINE-APAP, sleep, (ACETAMINOPHEN PM)  MG tablet Take 2 tablets by mouth nightly as needed for Sleep    Geronimo Brower MD   Ketotifen Fumarate (ALLERGY EYE DROPS OP) Apply 1 drop to eye nightly    Geronimo Brower MD   Azelastine HCl (ASTEPRO NA) 2 drops by Nasal route daily    Geronimo Brower MD   Acetaminophen (TYLENOL 8 HOUR ARTHRITIS PAIN PO) Take by mouth    Geronimo Brower MD   simvastatin (ZOCOR) 20 MG tablet Take 1 tablet by mouth nightly    ProviderGeronimo MD       Current medications:    No current facility-administered medications for this encounter.       Allergies:    Allergies   Allergen Reactions   •

## 2024-03-29 NOTE — BRIEF OP NOTE
Brief Postoperative Note      Patient: Annie Beltran  YOB: 1945  MRN: [id number]    Date of Procedure: 3/29/2024    Pre-Op Diagnosis Codes:   Lumbar spine spondylolisthesis    Post-Op Diagnosis: Same       Procedure(s):  1.)Anterior lumbar interbody fusion, L4-S1.   2.)Posterior instrumentation, robot assisted, L4-S1.   3.)Removal of spinal stimulator.       Surgeon(s):  Earl Verma DO Walker, Gregory W, DO Blankenship, Andrew Z, DO    Assistant:  Resident: Ten Quick DO    Anesthesia: General    Estimated Blood Loss (mL): 1075 mL    Fluids (mL): 3500 crystalloid, 500 albumin, 375 cell saver.    Complications: None    Specimens:   * No specimens in log *    Implants:  Implant Name Type Inv. Item Serial No.  Lot No. LRB No. Used Action   KIT BNE GRFT XSM 1.4CC RHBMP-2 ABSRB CLLGN SPNG INFUSE - [id number]  KIT BNE GRFT XSM 1.4CC RHBMP-2 ABSRB CLLGN SPNG INFUSE  [employer] SPINALGRAFT TECH-WD [id number] N/A 1 Implanted   GRAFT BNE SUB 5CC DEMIN BNE MTRX PTTY GRFTON - [id number]  GRAFT BNE SUB 5CC DEMIN BNE MTRX PTTY GRFTON [id number] [employer] SPINALGRAFT TECH-WD  N/A 1 Implanted   SCREW SPNL LCK 5X20 MM TITAN NANOLOCK STRL ANTERALIGN LTX - [id number]  SCREW SPNL LCK 5X20 MM TITAN NANOLOCK STRL ANTERALIGN LTX  [employer] SOFAMOR DANEK-WD [id number] N/A 2 Implanted   SCREW SPNL LCK 5X30 MM TITAN NANOLOCK STRL ANTERALIGN LTX - [id number]  SCREW SPNL LCK 5X30 MM TITAN NANOLOCK STRL ANTERALIGN LTX  [employer] SOFAMOR DANEK-WD [id number] N/A 1 Implanted   SCREW SPNL LCK 5X20 MM TITAN NANOLOCK STRL ANTERALIGN LTX - [id number]  SCREW SPNL LCK 5X20 MM TITAN NANOLOCK STRL ANTERALIGN LTX  [employer] SOFAMOR DANEK-WD [id number] N/A 1 Implanted   KIT BNE GRFT XSM 1.4CC RHBMP-2 ABSRB CLLGN SPNG INFUSE - [id number]  KIT BNE GRFT XSM 1.4CC RHBMP-2 ABSRB CLLGN SPNG INFUSE  [employer] SPINALGRAFT TECH- [id number] N/A 1 Implanted   ANTERALIGN SPINAL SYSTEM CECILIO BLISSLCOK LS SPACER    [employer] [facility]-

## 2024-03-29 NOTE — DISCHARGE INSTRUCTIONS
Orthopedic Spine Discharge Instructions:  -Mobilize as tolerated. Avoid excessive Bending, Lifting, and Twisting (BLT).  -Maintain surgical dressing in place until follow-up if possible. If dressing comes off with activity and hygiene, may leave uncovered if no drainage. Otherwise may replace with simple gauze and tape dressing.  -Ice (20 minutes on and off 1 hour) as needed for swelling/pain.  -Drink plenty of fluids.  -Call the office or come to Emergency Room if signs of infection appear (hot, swollen, red, draining pus, fever).  -Take medications as prescribed.  -Wean off narcotics (Percocet/Norco) as soon as possible. Do not take Tylenol if still taking narcotics.  -No alcoholic beverages or driving/operating machinery while on narcotics  -Follow up with Dr. Verma in his office in 10-14 days after surgery/discharge. Call [phone] to schedule.

## 2024-03-30 LAB
ANION GAP SERPL CALCULATED.3IONS-SCNC: 16 MMOL/L (ref 9–16)
BUN SERPL-MCNC: 25 MG/DL (ref 8–23)
CALCIUM SERPL-MCNC: 7.9 MG/DL (ref 8.6–10.4)
CHLORIDE SERPL-SCNC: 101 MMOL/L (ref 98–107)
CO2 SERPL-SCNC: 18 MMOL/L (ref 20–31)
CREAT SERPL-MCNC: 1 MG/DL (ref 0.5–0.9)
ERYTHROCYTE [DISTWIDTH] IN BLOOD BY AUTOMATED COUNT: 12.7 % (ref 11.8–14.4)
GFR SERPL CREATININE-BSD FRML MDRD: 56 ML/MIN/1.73M2
GLUCOSE SERPL-MCNC: 176 MG/DL (ref 74–99)
HCT VFR BLD AUTO: 25.8 % (ref 36.3–47.1)
HGB BLD-MCNC: 8 G/DL (ref 11.9–15.1)
MCH RBC QN AUTO: 31.7 PG (ref 25.2–33.5)
MCHC RBC AUTO-ENTMCNC: 31 G/DL (ref 28.4–34.8)
MCV RBC AUTO: 102.4 FL (ref 82.6–102.9)
NRBC BLD-RTO: 0 PER 100 WBC
PLATELET # BLD AUTO: 147 K/UL (ref 138–453)
PMV BLD AUTO: 9.3 FL (ref 8.1–13.5)
POTASSIUM SERPL-SCNC: 4.5 MMOL/L (ref 3.7–5.3)
RBC # BLD AUTO: 2.52 M/UL (ref 3.95–5.11)
SODIUM SERPL-SCNC: 135 MMOL/L (ref 136–145)
WBC OTHER # BLD: 7 K/UL (ref 3.5–11.3)

## 2024-03-30 PROCEDURE — 6370000000 HC RX 637 (ALT 250 FOR IP)

## 2024-03-30 PROCEDURE — 1200000000 HC SEMI PRIVATE

## 2024-03-30 PROCEDURE — 97530 THERAPEUTIC ACTIVITIES: CPT

## 2024-03-30 PROCEDURE — 85027 COMPLETE CBC AUTOMATED: CPT

## 2024-03-30 PROCEDURE — 80048 BASIC METABOLIC PNL TOTAL CA: CPT

## 2024-03-30 PROCEDURE — 2580000003 HC RX 258

## 2024-03-30 PROCEDURE — 51798 US URINE CAPACITY MEASURE: CPT

## 2024-03-30 PROCEDURE — 83036 HEMOGLOBIN GLYCOSYLATED A1C: CPT

## 2024-03-30 PROCEDURE — 6370000000 HC RX 637 (ALT 250 FOR IP): Performed by: STUDENT IN AN ORGANIZED HEALTH CARE EDUCATION/TRAINING PROGRAM

## 2024-03-30 PROCEDURE — 97166 OT EVAL MOD COMPLEX 45 MIN: CPT

## 2024-03-30 PROCEDURE — 97162 PT EVAL MOD COMPLEX 30 MIN: CPT

## 2024-03-30 PROCEDURE — 6360000002 HC RX W HCPCS

## 2024-03-30 PROCEDURE — 97535 SELF CARE MNGMENT TRAINING: CPT

## 2024-03-30 PROCEDURE — 99222 1ST HOSP IP/OBS MODERATE 55: CPT | Performed by: STUDENT IN AN ORGANIZED HEALTH CARE EDUCATION/TRAINING PROGRAM

## 2024-03-30 PROCEDURE — 51701 INSERT BLADDER CATHETER: CPT

## 2024-03-30 PROCEDURE — 36415 COLL VENOUS BLD VENIPUNCTURE: CPT

## 2024-03-30 RX ORDER — PANTOPRAZOLE SODIUM 40 MG/1
40 TABLET, DELAYED RELEASE ORAL
Status: DISCONTINUED | OUTPATIENT
Start: 2024-03-30 | End: 2024-04-09 | Stop reason: HOSPADM

## 2024-03-30 RX ORDER — SENNOSIDES 8.8 MG/5ML
5 LIQUID ORAL 2 TIMES DAILY
Status: DISCONTINUED | OUTPATIENT
Start: 2024-03-30 | End: 2024-04-09 | Stop reason: HOSPADM

## 2024-03-30 RX ORDER — CALCIUM CARBONATE 500 MG/1
500 TABLET, CHEWABLE ORAL 3 TIMES DAILY PRN
Status: DISCONTINUED | OUTPATIENT
Start: 2024-03-30 | End: 2024-04-09 | Stop reason: HOSPADM

## 2024-03-30 RX ORDER — PANTOPRAZOLE SODIUM 40 MG/1
40 TABLET, DELAYED RELEASE ORAL
Status: DISCONTINUED | OUTPATIENT
Start: 2024-03-31 | End: 2024-03-30

## 2024-03-30 RX ADMIN — ONDANSETRON 4 MG: 2 INJECTION INTRAMUSCULAR; INTRAVENOUS at 13:00

## 2024-03-30 RX ADMIN — OXYCODONE 10 MG: 5 TABLET ORAL at 15:54

## 2024-03-30 RX ADMIN — ACETAMINOPHEN 325MG 650 MG: 325 TABLET ORAL at 20:25

## 2024-03-30 RX ADMIN — SODIUM CHLORIDE: 9 INJECTION, SOLUTION INTRAVENOUS at 16:24

## 2024-03-30 RX ADMIN — ANTACID TABLETS 500 MG: 500 TABLET, CHEWABLE ORAL at 22:47

## 2024-03-30 RX ADMIN — OXYCODONE 10 MG: 5 TABLET ORAL at 20:25

## 2024-03-30 RX ADMIN — PANTOPRAZOLE SODIUM 40 MG: 40 TABLET, DELAYED RELEASE ORAL at 14:08

## 2024-03-30 RX ADMIN — OXYCODONE 10 MG: 5 TABLET ORAL at 12:07

## 2024-03-30 RX ADMIN — ACETAMINOPHEN 325MG 650 MG: 325 TABLET ORAL at 08:40

## 2024-03-30 RX ADMIN — TIZANIDINE 2 MG: 2 TABLET ORAL at 23:36

## 2024-03-30 RX ADMIN — TIZANIDINE 2 MG: 2 TABLET ORAL at 01:46

## 2024-03-30 RX ADMIN — Medication 2000 MG: at 07:54

## 2024-03-30 RX ADMIN — ACETAMINOPHEN 325MG 650 MG: 325 TABLET ORAL at 14:08

## 2024-03-30 RX ADMIN — SENNOSIDES 8.8 MG: 8.8 LIQUID ORAL at 20:26

## 2024-03-30 RX ADMIN — TIZANIDINE 2 MG: 2 TABLET ORAL at 13:00

## 2024-03-30 RX ADMIN — HYDROMORPHONE HYDROCHLORIDE 0.5 MG: 1 INJECTION, SOLUTION INTRAMUSCULAR; INTRAVENOUS; SUBCUTANEOUS at 01:38

## 2024-03-30 RX ADMIN — ANTACID TABLETS 500 MG: 500 TABLET, CHEWABLE ORAL at 16:23

## 2024-03-30 RX ADMIN — ACETAMINOPHEN 325MG 650 MG: 325 TABLET ORAL at 01:38

## 2024-03-30 RX ADMIN — SODIUM CHLORIDE, PRESERVATIVE FREE 10 ML: 5 INJECTION INTRAVENOUS at 07:54

## 2024-03-30 ASSESSMENT — PAIN DESCRIPTION - LOCATION
LOCATION: ABDOMEN

## 2024-03-30 ASSESSMENT — PAIN SCALES - GENERAL
PAINLEVEL_OUTOF10: 3
PAINLEVEL_OUTOF10: 3
PAINLEVEL_OUTOF10: 7
PAINLEVEL_OUTOF10: 4
PAINLEVEL_OUTOF10: 6
PAINLEVEL_OUTOF10: 10
PAINLEVEL_OUTOF10: 5
PAINLEVEL_OUTOF10: 7
PAINLEVEL_OUTOF10: 7
PAINLEVEL_OUTOF10: 5
PAINLEVEL_OUTOF10: 6

## 2024-03-30 ASSESSMENT — PAIN DESCRIPTION - ORIENTATION
ORIENTATION: ANTERIOR

## 2024-03-30 ASSESSMENT — PAIN DESCRIPTION - DESCRIPTORS
DESCRIPTORS: ACHING
DESCRIPTORS: ACHING;DISCOMFORT
DESCRIPTORS: ACHING

## 2024-03-30 ASSESSMENT — PAIN - FUNCTIONAL ASSESSMENT: PAIN_FUNCTIONAL_ASSESSMENT: ACTIVITIES ARE NOT PREVENTED

## 2024-03-30 NOTE — PROGRESS NOTES
Progress Note    Patient:  Annie Beltran  YOB: 1945     79 y.o. female    Subjective:  Patient seen and examined at bedside this morning. Other than a mild nausea patient is doing well with no other complaints or concerns per patient this morning. No acute issues overnight per nursing. Pain well-controlled on current regimen. Denies: fever/chills, HA, CP, SOB, dysuria, or numbness/tingling in extremities. No BM, but passing flatus.    Objective:   Vitals:    03/29/24 2130   BP: (!) 133/56   Pulse:    Resp:    Temp:    SpO2:      Gen: NAD, cooperative   Abdomen: Dressings are C/D/I without saturation or strike through. Appropriately tender to palpation.  Back: Dressings C/D/I without strikethrough or saturation. Appropriately tender to palpation.    BUE: 2+ rad pulse w/ BCR. Median/Radial/Ulnar/AIN/PIN motor intact. C4-T1 SILT.   BLE: 2+ DP/PT pulses w/ BCR. TA/EHL/FHL/GSC gross motor intact. L3-S1 SILT.    No results for input(s): \"WBC\", \"HGB\", \"HCT\", \"PLT\", \"INR\", \"PTT\", \"NA\", \"K\", \"BUN\", \"CREATININE\", \"GLUCOSE\", \"ESR\", \"CRP\" in the last 72 hours.    Invalid input(s): \"PT\"    Meds:   Abx: Ancef   See rec for complete list    Impression:      Plan: 79 y.o. female who is being seen for:    - Lumbar spinal stenosis:    s/p ALIF with PI L4-S1 and removal of spinal stimulator, DOS: 3/29/24, POD#1    -No further orthopedic intervention planned at this time  -AAT  -Complete post op Ancef  -Pain control: multimodal; avoid NSAIDs  -Tolerating PO intake well  -Ice (20 min, 1 hour off) for edema/pain control  -Encourage IS and deep breathing  -DVT ppx: encourage being up and OOB & EPC, no chemical AC until POD#5  -PT/OT  -Please page DO ortho with any questions    Duglas Choi MD, MD  PGY-1 Orthopedic Surgery  6:56 AM 3/30/2024

## 2024-03-30 NOTE — PROGRESS NOTES
Pt a/o x4 ortho resident at bedside this am pt n/v zofran admin to treat. continent B&B up to the restroom unable to urinate. [facility] notified n/o received.

## 2024-03-30 NOTE — PROGRESS NOTES
Occupational Therapy  Facility/Department: [address] ORTHO/MED SURG  Occupational Therapy Initial Assessment    Name: Annie Beltran  : 1945  MRN: [id number]  Date of Service: 3/30/2024    Discharge Recommendations:  Patient would benefit from continued therapy after discharge  OT Equipment Recommendations  Equipment Needed: Yes  Mobility Devices: Walker  Walker: Rolling     Copied from General Surgery: Patient presents for PAT appointment.  She has a history of lower back pain, s/p lumbar surgery in the 1970s and then a revision following this.  She has underwent pain management procedures and spinal cord stimulator placement in the past.  She continues with pain, has undergone myelogram.  She has been scheduled for ANTERIOR LUMBAR INTERBODY FUSION L4-S1.      Patient Diagnosis(es): The encounter diagnosis was Post-op pain.    Past Medical History:  has a past medical history of Arthritis, Bell's palsy, Hearing loss, Hyperlipidemia, Hypertension, Lumbar disc disease, Seasonal allergies, Under care of service provider, and Wears glasses.    Past Surgical History:  has a past surgical history that includes  section; Colonoscopy (); Appendectomy; Pain management procedure (Right, 2016); Pain management procedure (Right, 2016); Pain management procedure (2017); Nerve Block Lumb Facet Level 1 Bilateral (Right, 2017); Humerus Closed Reduction (Right, 2021); back surgery; Pain management procedure (2020); Shoulder Arthroplasty (Right, 10/18/2021); Spinal Cord Stimulator Surgery (); and Spinal cord stimulator removal (2024).    Treatment Diagnosis: S/P Lumbar Spinal Fusion L4-S1 3/29/2024    Assessment   Pt lying supine in bed upon entrance to room. Pt completed bed mobility with contact guard assistance. Pt sat at eob 30 minutes with good unsupported sitting balance. Sit to stand with contact guard assistance. Pt completed dynamic mob in room with contact guard  assistance. Please refer to below assist levels for adl completion. Pt ed on OT POC, safety awareness tech, proper hand placement for transfers, and energy conservation tech with proper breathing tech with good return. Pt retired supine in bed with call light and phone in reach, bed alarm activated, RN informed. All needs met upon exit.   Performance deficits / Impairments: Decreased functional mobility ;Decreased high-level IADLs;Decreased ADL status;Decreased endurance;Decreased balance;Decreased safe awareness  Treatment Diagnosis: S/P Lumbar Spinal Fusion L4-S1 3/29/2024  Prognosis: Good  Decision Making: Medium Complexity  REQUIRES OT FOLLOW-UP: Yes  Activity Tolerance  Activity Tolerance: Patient limited by fatigue;Patient limited by pain        Plan   Occupational Therapy Plan  Times Per Week: 3-4 x week     Restrictions  Restrictions/Precautions  Restrictions/Precautions: Fall Risk, Weight Bearing, Bed Alarm  Required Braces or Orthoses?: No  Lower Extremity Weight Bearing Restrictions  Partial Weight Bearing Percentage Or Pounds: Full weight bearing as tolerated  Partial Weight Bearing Percentage Or Pounds: Full weight bearing as tolerated  Position Activity Restriction  Other position/activity restrictions: Beginning POD #1 1)  Up to bedside chair at least three times a day as tolerated. 2)  Ambulate in room progressing to hallway with assistive device four times daily (including PT) when PT advises.   3)  Bathroom privileges with assistance.    Subjective   General  Patient assessed for rehabilitation services?: Yes  Family / Caregiver Present: No  pt c/o 4/10 surgical back pain. Pain meds on board, ice applied, repositioned for comfort    Social/Functional History  Social/Functional History  Lives With: Alone  Type of Home: Apartment (8th floor apt)  Home Layout: One level  Home Access: Elevator, Level entry  Bathroom Shower/Tub: Tub/Shower unit, Curtain, Shower chair with back  Bathroom Toilet:

## 2024-03-30 NOTE — CONSULTS
[facility]  Office: [phone]  Enoch Hou DO, Irineo Pereira DO, eTja Pedroza DO, Aleksandr Goff DO, Arsenio Gary MD, Crys Schroeder MD, Micheal Johnson MD, Ally Cordova MD,  Sathish Poon MD, Julia Smith MD, Fatimah Infante MD,  Venancio Silverman DO, Dony Kaur MD, Zach Liao MD, Zbigniew Hou DO, Lucrecia Lima MD,  Herminio Simon DO, Uzma Reyes MD, Mary Lane MD, Peace Benoit MD, Philip Perez MD,  Ismael Landa MD, Helen Ybarra MD, Mario Campuzano MD, Kaity Green MD, Ender Leslie MD, Prudencio Lewis MD, Abraham Levine DO, Saad Borjas DO, Celena Mancilla MD,  Facundo Vaughn MD, Shirley Waterhouse, CNP,  Janet Hills CNP, Bala Street, CNP,  Mariah Grissom, DNP, Delia Collins, CNP, Erinn Estrada, CNP, Renae White, CNP, Chayo Desai, CNP, Shannon Marks, PA-C, Janis Mercer PA-C, Anya Lassiter, CNP, Alka Gregorio, CNP, Héctor Judd, CNP, Day Philip, CNP, Luda Melgar, CNP, Kizzy Mcleod, CNS, Tess Bullard, CNP, Amy Gomez CNP, Tracy Schwab, CNP         [facility]   IN-PATIENT SERVICE   [facility]    Progress Note    3/30/2024    12:15 PM    Name:   Annie Beltran  MRN:     [id number]     Acct:      [id number]   Room:   0250/0250-01   Day:  1  Admit Date:  3/29/2024  9:25 AM    PCP:   Shankar Brown MD  Code Status:  Full Code    Subjective:     C/C: Lumbar spinal stenosis  Interval History Status: improved.     Patient seen and examined at bedside this morning.  No acute events overnight.  Patient having urinary retention this morning.  Will straight cath.  Continues to have abdominal pain but is improving.  Pain is currently well-controlled.  Denies any chest pain, shortness of breath, lightheadedness, dizziness, fever or chills.  Patient is passing flatus but has not had a BM    Brief History:     Patient is a 79-year-old female s/p ALIF with PI L4-S1 and removal of spinal stimulator, DOS:  operation 3/29/2024 Yes     Plan:        Lumbar spinal stenosis S/P anterior lumbar fusion and removal of spinal stimulator POD #1  Ortho primary  Pain control per primary  Tolerating p.o.  Passing flatus, has not had BM yet  No chemical AC until POD #5  Continue senna for constipation    Urinary retention possibly secondary to constipation  Bladder scans every 6 hours  Straight cath as needed for >300 cc on bladder scan  If persists, will recommend urology consult      Prudencio Lewis MD  3/30/2024  12:15 PM

## 2024-03-30 NOTE — ANESTHESIA POSTPROCEDURE EVALUATION
POST- ANESTHESIA EVALUATION       Pt Name: Annie Beltran  MRN: [id number]  YOB: 1945  Date of evaluation: 3/29/2024  Time:  8:38 PM      /63   Pulse 76   Temp 96.8 °F (36 °C)   Resp (!) 9   SpO2 95%      Consciousness Level  Awake  Cardiopulmonary Status  Stable  Pain Adequately Treated YES  Nausea / Vomiting  NO  Adequate Hydration  YES  Anesthesia Related Complications NONE      Electronically signed by Oniel Peoples MD on 3/29/2024 at 8:38 PM     Department of Anesthesiology  Postprocedure Note    Patient: Annie Beltran  MRN: [id number]  YOB: 1945  Date of evaluation: 3/29/2024    Procedure Summary       Date: 03/29/24 Room / Location: [place] [address] / [facility]    Anesthesia Start: 1214 Anesthesia Stop: 1936    Procedures:       ANTERIOR LUMBAR INTERBODY FUSION L4-S1 (Spine Lumbar)      MIKE ROBOTIC POSTERIOR PERCUTANEOUS INSTRUMENTATION L4-S1, SPINAL CORD STIMULATOR BATTERY REMOVAL (Spine Lumbar)      ANTERIOR APPROACH (Spine Lumbar) Diagnosis:       Spondylolisthesis of lumbar region      (Spondylolisthesis of lumbar region [M43.16])    Surgeons: Earl Verma DO; Chandu Dior DO Responsible Provider: Oniel Peoples MD    Anesthesia Type: general ASA Status: 3            Anesthesia Type: No value filed.    David Phase I: David Score: 9    David Phase II:      Anesthesia Post Evaluation    No notable events documented.

## 2024-03-30 NOTE — PROGRESS NOTES
Physical Therapy  Facility/Department: [address] ORTHO/MED SURG  Physical Therapy Initial Assessment    Name: Annie Beltran  : 1945  MRN: [id number]  Date of Service: 3/30/2024    Discharge Recommendations:  Patient would benefit from continued therapy after discharge          Patient Diagnosis(es): The encounter diagnosis was Post-op pain.  Past Medical History:  has a past medical history of Arthritis, Bell's palsy, Hearing loss, Hyperlipidemia, Hypertension, Lumbar disc disease, Seasonal allergies, Under care of service provider, and Wears glasses.  Past Surgical History:  has a past surgical history that includes  section; Colonoscopy (); Appendectomy; Pain management procedure (Right, 2016); Pain management procedure (Right, 2016); Pain management procedure (2017); Nerve Block Lumb Facet Level 1 Bilateral (Right, 2017); Humerus Closed Reduction (Right, 2021); back surgery; Pain management procedure (2020); Shoulder Arthroplasty (Right, 10/18/2021); Spinal Cord Stimulator Surgery (); and Spinal cord stimulator removal (2024).    Assessment   Assessment: The pt ambulated 100 ft with a RW x CGA. She moved slowly and had mild fatigue following ambulation. She could benefit from a continuation of PT for gait , strengthening and functional mobility following her  her DC  Therapy Prognosis: Good  Decision Making: Medium Complexity  Requires PT Follow-Up: Yes  Activity Tolerance  Activity Tolerance: Patient limited by fatigue;Patient limited by endurance     Plan   Physical Therapy Plan  General Plan: 6-7 times per week  Current Treatment Recommendations: Strengthening, Functional mobility training, Transfer training, Endurance training, Stair training, Gait training, Safety education & training, Therapeutic activities  Safety Devices  Type of Devices: Nurse notified, Left in bed, Patient at risk for falls, Bed alarm in place  Restraints  Restraints Initially  Verbalized understanding      Therapy Time   Individual Concurrent Group Co-treatment   Time In 0750         Time Out 0813         Minutes 23             1 of 10    Kendrick Pope, PT

## 2024-03-30 NOTE — PROGRESS NOTES
Writer contacted pt's daughter Brandy Payton   at [phone] to update on pt and Pt's room number.  Daughter stated that she has pt's belongings including glasses and cell phone and will bring to hospital in the a.m.

## 2024-03-30 NOTE — PROGRESS NOTES
[facility]  Office: [phone]  Enoch Hou DO, Irineo Pereira DO, Teja Pedroza DO, Aleksandr Goff DO, Arsenio Gary MD, Crys Schroeder MD, Micheal Johnson MD, Ally Cordova MD,  Sathish Poon MD, Julia Smith MD, Fatimah Infante MD,  Venancio Silverman DO, Dony Kaur MD, Zach Liao MD, Zbigniew Hou DO, Lucrecia Lima MD,  Herminio Simon DO, Uzma Reyes MD, Mary Lane MD, Peace Benoit MD, Philip Perez MD,  Ismael Landa MD, Helen Ybarra MD, Mario Campuzano MD, Kaity Green MD, Ender Leslie MD, Prudencio Lewis MD, Abraham Levine DO, Saad Borjas DO, Celena Mancilla MD,  Facundo Vaughn MD, Shirley Waterhouse, CNP,  Janet Hills CNP, Bala Street, CNP,  Mariah Grissom, DNP, Delia Collins, CNP, Erinn Estrada, CNP, Renae White, CNP, Chayo Desai, CNP, Shannon Marks, PA-C, Janis Mercer PA-C, Anya Lassiter, CNP, Alka Gregorio, CNP, Héctor Judd, CNP, Day Philip, CNP, Luda Melgar, CNP, Kizzy Mcleod, CNS, Tess Bullard, CNP, Amy Gomez CNP, Tracy Schwab, CNP         [facility]   IN-PATIENT SERVICE   [facility]    Progress Note    3/30/2024    12:06 PM    Name:   Annie Beltran  MRN:     [id number]     Acct:      [id number]   Room:   0250/0250-01   Day:  1  Admit Date:  3/29/2024  9:25 AM    PCP:   Shankar Brown MD  Code Status:  Full Code    Subjective:     C/C: Lumbar spinal stenosis  Interval History Status: improved.     Patient seen and examined at bedside this morning.  No acute events overnight.  Patient having urinary retention this morning.  Will straight cath.  Continues to have abdominal pain but is improving.  Pain is currently well-controlled.  Denies any chest pain, shortness of breath, lightheadedness, dizziness, fever or chills.  Patient is passing flatus but has not had a BM    Brief History:     Patient is a 79-year-old female s/p ALIF with PI L4-S1 and removal of spinal stimulator, DOS:

## 2024-03-30 NOTE — PROGRESS NOTES
Patient has not voided, bladder scan of 346ml, notified Dr Lewis, straight cath for the second time, urine output of 400ml. Due to void after 6 hours. If still has not voided, may need urology consult as per IM. Primary service, resident Dr Choi notified.

## 2024-03-30 NOTE — CARE COORDINATION
Case Management Assessment  Initial Evaluation    Date/Time of Evaluation: 3/30/2024 10:00 AM  Assessment Completed by: Carisa Mcqueen RN    If patient is discharged prior to next notation, then this note serves as note for discharge by case management.    Patient Name: Annie Beltran                   YOB: 1945  Diagnosis: Spondylolisthesis of lumbar region [M43.16]  Status post lumbar spinal fusion [Z98.1]  Status post lumbar spine operation [Z98.890]                   Date / Time: 3/29/2024  9:25 AM    Patient Admission Status: Inpatient   Readmission Risk (Low < 19, Mod (19-27), High > 27): Readmission Risk Score: 12.9    Current PCP: Shankar Brown MD  PCP verified by CM? (P) Yes (Shankar Brown MD)    Chart Reviewed: Yes      History Provided by: (P) Patient  Patient Orientation: (P) Alert and Oriented, Person, Place, Situation, Self    Patient Cognition: (P) Alert    Hospitalization in the last 30 days (Readmission):  No    If yes, Readmission Assessment in CM Navigator will be completed.    Advance Directives:      Code Status: Full Code   Patient's Primary Decision Maker is: (P) Legal Next of Kin    Primary Decision Maker: Brandy Payton - Child - [phone]    Discharge Planning:    Patient lives with: (P) Alone Type of Home: (P) Apartment  Primary Care Giver: (P) Self  Patient Support Systems include: (P) Children, Family Members, Friends/Neighbors   Current Financial resources: (P) Medicare  Current community resources: (P) None  Current services prior to admission: (P) Durable Medical Equipment, Other (Comment) (grab bars in shower and at toilet)            Current DME: (P) Shower Chair            Type of Home Care services:  (P) None    ADLS  Prior functional level: (P) Independent in ADLs/IADLs  Current functional level: (P) Independent in ADLs/IADLs    PT AM-PAC:   /24  OT AM-PAC:   /24    Family can provide assistance at DC: (P) Yes  Would you like Case Management to discuss

## 2024-03-31 ENCOUNTER — APPOINTMENT (OUTPATIENT)
Dept: CT IMAGING | Age: 79
DRG: 459 | End: 2024-03-31
Attending: STUDENT IN AN ORGANIZED HEALTH CARE EDUCATION/TRAINING PROGRAM
Payer: MEDICARE

## 2024-03-31 ENCOUNTER — APPOINTMENT (OUTPATIENT)
Dept: GENERAL RADIOLOGY | Age: 79
DRG: 459 | End: 2024-03-31
Attending: STUDENT IN AN ORGANIZED HEALTH CARE EDUCATION/TRAINING PROGRAM
Payer: MEDICARE

## 2024-03-31 ENCOUNTER — APPOINTMENT (OUTPATIENT)
Dept: ULTRASOUND IMAGING | Age: 79
DRG: 459 | End: 2024-03-31
Attending: STUDENT IN AN ORGANIZED HEALTH CARE EDUCATION/TRAINING PROGRAM
Payer: MEDICARE

## 2024-03-31 ENCOUNTER — APPOINTMENT (OUTPATIENT)
Dept: MRI IMAGING | Age: 79
DRG: 459 | End: 2024-03-31
Attending: STUDENT IN AN ORGANIZED HEALTH CARE EDUCATION/TRAINING PROGRAM
Payer: MEDICARE

## 2024-03-31 PROBLEM — I63.89 CEREBRAL INFARCTION, WATERSHED DISTRIBUTION, UNILATERAL, ACUTE (HCC): Status: ACTIVE | Noted: 2024-03-31

## 2024-03-31 LAB
ALBUMIN SERPL-MCNC: 3.3 G/DL (ref 3.5–5.2)
ALBUMIN/GLOB SERPL: 2 {RATIO} (ref 1–2.5)
ALP SERPL-CCNC: 94 U/L (ref 35–104)
ALT SERPL-CCNC: 12 U/L (ref 10–35)
ANION GAP SERPL CALCULATED.3IONS-SCNC: 9 MMOL/L (ref 9–16)
AST SERPL-CCNC: 42 U/L (ref 10–35)
BACTERIA URNS QL MICRO: NORMAL
BILIRUB SERPL-MCNC: 0.4 MG/DL (ref 0–1.2)
BILIRUB UR QL STRIP: NEGATIVE
BUN SERPL-MCNC: 24 MG/DL (ref 8–23)
CALCIUM SERPL-MCNC: 7.7 MG/DL (ref 8.6–10.4)
CASTS #/AREA URNS LPF: NORMAL /LPF (ref 0–8)
CHLORIDE SERPL-SCNC: 99 MMOL/L (ref 98–107)
CLARITY UR: CLEAR
CO2 SERPL-SCNC: 20 MMOL/L (ref 20–31)
COLOR UR: YELLOW
CREAT SERPL-MCNC: 1 MG/DL (ref 0.5–0.9)
EPI CELLS #/AREA URNS HPF: NORMAL /HPF (ref 0–5)
GFR SERPL CREATININE-BSD FRML MDRD: 59 ML/MIN/1.73M2
GLUCOSE BLD-MCNC: 115 MG/DL (ref 65–105)
GLUCOSE SERPL-MCNC: 104 MG/DL (ref 74–99)
GLUCOSE UR STRIP-MCNC: NEGATIVE MG/DL
HGB UR QL STRIP.AUTO: ABNORMAL
INR PPP: 1
KETONES UR STRIP-MCNC: NEGATIVE MG/DL
LACTIC ACID, WHOLE BLOOD: 1.2 MMOL/L (ref 0.7–2.1)
LEUKOCYTE ESTERASE UR QL STRIP: NEGATIVE
NITRITE UR QL STRIP: NEGATIVE
PH UR STRIP: 5.5 [PH] (ref 5–8)
POTASSIUM SERPL-SCNC: 4.3 MMOL/L (ref 3.7–5.3)
PROT SERPL-MCNC: 5.4 G/DL (ref 6.6–8.7)
PROT UR STRIP-MCNC: NEGATIVE MG/DL
PROTHROMBIN TIME: 12.8 SEC (ref 11.7–14.9)
RBC #/AREA URNS HPF: NORMAL /HPF (ref 0–4)
SODIUM SERPL-SCNC: 128 MMOL/L (ref 136–145)
SP GR UR STRIP: 1.04 (ref 1–1.03)
TROPONIN I SERPL HS-MCNC: 16 NG/L (ref 0–14)
TSH SERPL DL<=0.05 MIU/L-ACNC: 3.12 UIU/ML (ref 0.27–4.2)
UROBILINOGEN UR STRIP-ACNC: NORMAL EU/DL (ref 0–1)
WBC #/AREA URNS HPF: NORMAL /HPF (ref 0–5)

## 2024-03-31 PROCEDURE — 99232 SBSQ HOSP IP/OBS MODERATE 35: CPT | Performed by: STUDENT IN AN ORGANIZED HEALTH CARE EDUCATION/TRAINING PROGRAM

## 2024-03-31 PROCEDURE — 83605 ASSAY OF LACTIC ACID: CPT

## 2024-03-31 PROCEDURE — 84484 ASSAY OF TROPONIN QUANT: CPT

## 2024-03-31 PROCEDURE — 76705 ECHO EXAM OF ABDOMEN: CPT

## 2024-03-31 PROCEDURE — 6360000002 HC RX W HCPCS

## 2024-03-31 PROCEDURE — 80053 COMPREHEN METABOLIC PANEL: CPT

## 2024-03-31 PROCEDURE — 6370000000 HC RX 637 (ALT 250 FOR IP): Performed by: PSYCHIATRY & NEUROLOGY

## 2024-03-31 PROCEDURE — 6370000000 HC RX 637 (ALT 250 FOR IP)

## 2024-03-31 PROCEDURE — 2000000000 HC ICU R&B

## 2024-03-31 PROCEDURE — 2580000003 HC RX 258: Performed by: PSYCHIATRY & NEUROLOGY

## 2024-03-31 PROCEDURE — 6370000000 HC RX 637 (ALT 250 FOR IP): Performed by: STUDENT IN AN ORGANIZED HEALTH CARE EDUCATION/TRAINING PROGRAM

## 2024-03-31 PROCEDURE — 74018 RADEX ABDOMEN 1 VIEW: CPT

## 2024-03-31 PROCEDURE — 70551 MRI BRAIN STEM W/O DYE: CPT

## 2024-03-31 PROCEDURE — 6360000002 HC RX W HCPCS: Performed by: PSYCHIATRY & NEUROLOGY

## 2024-03-31 PROCEDURE — 6360000002 HC RX W HCPCS: Performed by: INTERNAL MEDICINE

## 2024-03-31 PROCEDURE — 85610 PROTHROMBIN TIME: CPT

## 2024-03-31 PROCEDURE — 81001 URINALYSIS AUTO W/SCOPE: CPT

## 2024-03-31 PROCEDURE — 36415 COLL VENOUS BLD VENIPUNCTURE: CPT

## 2024-03-31 PROCEDURE — 82947 ASSAY GLUCOSE BLOOD QUANT: CPT

## 2024-03-31 PROCEDURE — 2580000003 HC RX 258

## 2024-03-31 PROCEDURE — 84443 ASSAY THYROID STIM HORMONE: CPT

## 2024-03-31 PROCEDURE — 6360000004 HC RX CONTRAST MEDICATION: Performed by: STUDENT IN AN ORGANIZED HEALTH CARE EDUCATION/TRAINING PROGRAM

## 2024-03-31 PROCEDURE — 70450 CT HEAD/BRAIN W/O DYE: CPT

## 2024-03-31 PROCEDURE — 70498 CT ANGIOGRAPHY NECK: CPT

## 2024-03-31 PROCEDURE — 72070 X-RAY EXAM THORAC SPINE 2VWS: CPT

## 2024-03-31 PROCEDURE — 71260 CT THORAX DX C+: CPT

## 2024-03-31 RX ORDER — ONDANSETRON 4 MG/1
4 TABLET, ORALLY DISINTEGRATING ORAL EVERY 8 HOURS PRN
Status: DISCONTINUED | OUTPATIENT
Start: 2024-03-31 | End: 2024-04-09 | Stop reason: HOSPADM

## 2024-03-31 RX ORDER — POLYETHYLENE GLYCOL 3350 17 G/17G
17 POWDER, FOR SOLUTION ORAL DAILY PRN
Status: DISCONTINUED | OUTPATIENT
Start: 2024-03-31 | End: 2024-04-09 | Stop reason: HOSPADM

## 2024-03-31 RX ORDER — 0.9 % SODIUM CHLORIDE 0.9 %
1000 INTRAVENOUS SOLUTION INTRAVENOUS ONCE
Status: COMPLETED | OUTPATIENT
Start: 2024-03-31 | End: 2024-03-31

## 2024-03-31 RX ORDER — ACETAMINOPHEN 325 MG/1
650 TABLET ORAL EVERY 6 HOURS PRN
Status: DISCONTINUED | OUTPATIENT
Start: 2024-03-31 | End: 2024-04-09 | Stop reason: HOSPADM

## 2024-03-31 RX ORDER — BACLOFEN 5 MG/1
5 TABLET ORAL ONCE
Status: COMPLETED | OUTPATIENT
Start: 2024-03-31 | End: 2024-03-31

## 2024-03-31 RX ORDER — SODIUM CHLORIDE 9 MG/ML
INJECTION, SOLUTION INTRAVENOUS PRN
Status: DISCONTINUED | OUTPATIENT
Start: 2024-03-31 | End: 2024-04-09 | Stop reason: HOSPADM

## 2024-03-31 RX ORDER — SODIUM CHLORIDE 9 MG/ML
INJECTION, SOLUTION INTRAVENOUS CONTINUOUS
Status: DISCONTINUED | OUTPATIENT
Start: 2024-03-31 | End: 2024-04-04

## 2024-03-31 RX ORDER — ONDANSETRON 2 MG/ML
4 INJECTION INTRAMUSCULAR; INTRAVENOUS EVERY 6 HOURS PRN
Status: DISCONTINUED | OUTPATIENT
Start: 2024-03-31 | End: 2024-03-31

## 2024-03-31 RX ORDER — SODIUM CHLORIDE 0.9 % (FLUSH) 0.9 %
5-40 SYRINGE (ML) INJECTION EVERY 12 HOURS SCHEDULED
Status: DISCONTINUED | OUTPATIENT
Start: 2024-03-31 | End: 2024-04-09 | Stop reason: HOSPADM

## 2024-03-31 RX ORDER — ASPIRIN 81 MG/1
81 TABLET ORAL DAILY
Status: DISCONTINUED | OUTPATIENT
Start: 2024-03-31 | End: 2024-04-09 | Stop reason: HOSPADM

## 2024-03-31 RX ORDER — ROSUVASTATIN CALCIUM 20 MG/1
20 TABLET, COATED ORAL NIGHTLY
Status: DISCONTINUED | OUTPATIENT
Start: 2024-03-31 | End: 2024-04-03

## 2024-03-31 RX ORDER — SODIUM CHLORIDE 0.9 % (FLUSH) 0.9 %
5-40 SYRINGE (ML) INJECTION EVERY 12 HOURS SCHEDULED
Status: DISCONTINUED | OUTPATIENT
Start: 2024-03-31 | End: 2024-03-31

## 2024-03-31 RX ORDER — SODIUM CHLORIDE 9 MG/ML
INJECTION, SOLUTION INTRAVENOUS PRN
Status: DISCONTINUED | OUTPATIENT
Start: 2024-03-31 | End: 2024-03-31

## 2024-03-31 RX ORDER — POTASSIUM CHLORIDE 29.8 MG/ML
20 INJECTION INTRAVENOUS PRN
Status: DISCONTINUED | OUTPATIENT
Start: 2024-03-31 | End: 2024-04-09 | Stop reason: HOSPADM

## 2024-03-31 RX ORDER — CLOPIDOGREL BISULFATE 75 MG/1
300 TABLET ORAL ONCE
Status: COMPLETED | OUTPATIENT
Start: 2024-03-31 | End: 2024-03-31

## 2024-03-31 RX ORDER — ASPIRIN 81 MG/1
81 TABLET ORAL DAILY
Status: DISCONTINUED | OUTPATIENT
Start: 2024-04-01 | End: 2024-03-31

## 2024-03-31 RX ORDER — LABETALOL HYDROCHLORIDE 5 MG/ML
10 INJECTION, SOLUTION INTRAVENOUS EVERY 4 HOURS PRN
Status: DISCONTINUED | OUTPATIENT
Start: 2024-03-31 | End: 2024-04-01

## 2024-03-31 RX ORDER — ACETAMINOPHEN 650 MG/1
650 SUPPOSITORY RECTAL EVERY 6 HOURS PRN
Status: DISCONTINUED | OUTPATIENT
Start: 2024-03-31 | End: 2024-04-09 | Stop reason: HOSPADM

## 2024-03-31 RX ORDER — ONDANSETRON 2 MG/ML
4 INJECTION INTRAMUSCULAR; INTRAVENOUS EVERY 6 HOURS PRN
Status: DISCONTINUED | OUTPATIENT
Start: 2024-03-31 | End: 2024-04-09 | Stop reason: HOSPADM

## 2024-03-31 RX ORDER — ONDANSETRON 4 MG/1
4 TABLET, ORALLY DISINTEGRATING ORAL EVERY 8 HOURS PRN
Status: DISCONTINUED | OUTPATIENT
Start: 2024-03-31 | End: 2024-03-31

## 2024-03-31 RX ORDER — CLOPIDOGREL BISULFATE 75 MG/1
75 TABLET ORAL DAILY
Status: DISCONTINUED | OUTPATIENT
Start: 2024-04-01 | End: 2024-04-02

## 2024-03-31 RX ORDER — SODIUM CHLORIDE 0.9 % (FLUSH) 0.9 %
5-40 SYRINGE (ML) INJECTION PRN
Status: DISCONTINUED | OUTPATIENT
Start: 2024-03-31 | End: 2024-04-09 | Stop reason: HOSPADM

## 2024-03-31 RX ORDER — ASPIRIN 325 MG
325 TABLET ORAL ONCE
Status: COMPLETED | OUTPATIENT
Start: 2024-03-31 | End: 2024-03-31

## 2024-03-31 RX ORDER — POLYETHYLENE GLYCOL 3350 17 G/17G
17 POWDER, FOR SOLUTION ORAL DAILY PRN
Status: DISCONTINUED | OUTPATIENT
Start: 2024-03-31 | End: 2024-03-31

## 2024-03-31 RX ORDER — ENOXAPARIN SODIUM 100 MG/ML
40 INJECTION SUBCUTANEOUS DAILY
Status: DISCONTINUED | OUTPATIENT
Start: 2024-03-31 | End: 2024-03-31

## 2024-03-31 RX ORDER — ENOXAPARIN SODIUM 100 MG/ML
1 INJECTION SUBCUTANEOUS 2 TIMES DAILY
Status: DISCONTINUED | OUTPATIENT
Start: 2024-04-01 | End: 2024-04-02

## 2024-03-31 RX ORDER — SODIUM CHLORIDE 0.9 % (FLUSH) 0.9 %
5-40 SYRINGE (ML) INJECTION PRN
Status: DISCONTINUED | OUTPATIENT
Start: 2024-03-31 | End: 2024-03-31

## 2024-03-31 RX ORDER — MAGNESIUM SULFATE IN WATER 40 MG/ML
2000 INJECTION, SOLUTION INTRAVENOUS PRN
Status: DISCONTINUED | OUTPATIENT
Start: 2024-03-31 | End: 2024-04-09 | Stop reason: HOSPADM

## 2024-03-31 RX ORDER — POTASSIUM CHLORIDE 7.45 MG/ML
10 INJECTION INTRAVENOUS PRN
Status: DISCONTINUED | OUTPATIENT
Start: 2024-03-31 | End: 2024-04-09 | Stop reason: HOSPADM

## 2024-03-31 RX ADMIN — ENOXAPARIN SODIUM 40 MG: 100 INJECTION SUBCUTANEOUS at 17:05

## 2024-03-31 RX ADMIN — ROSUVASTATIN CALCIUM 20 MG: 20 TABLET, FILM COATED ORAL at 19:47

## 2024-03-31 RX ADMIN — HYDROMORPHONE HYDROCHLORIDE 0.5 MG: 1 INJECTION, SOLUTION INTRAMUSCULAR; INTRAVENOUS; SUBCUTANEOUS at 21:38

## 2024-03-31 RX ADMIN — SODIUM CHLORIDE, PRESERVATIVE FREE 10 ML: 5 INJECTION INTRAVENOUS at 19:54

## 2024-03-31 RX ADMIN — SENNOSIDES 8.8 MG: 8.8 LIQUID ORAL at 19:47

## 2024-03-31 RX ADMIN — SODIUM CHLORIDE, PRESERVATIVE FREE 10 ML: 5 INJECTION INTRAVENOUS at 09:52

## 2024-03-31 RX ADMIN — SODIUM CHLORIDE: 9 INJECTION, SOLUTION INTRAVENOUS at 20:20

## 2024-03-31 RX ADMIN — SENNOSIDES 8.8 MG: 8.8 LIQUID ORAL at 08:11

## 2024-03-31 RX ADMIN — ONDANSETRON 4 MG: 2 INJECTION INTRAMUSCULAR; INTRAVENOUS at 13:11

## 2024-03-31 RX ADMIN — PANTOPRAZOLE SODIUM 40 MG: 40 TABLET, DELAYED RELEASE ORAL at 08:10

## 2024-03-31 RX ADMIN — ACETAMINOPHEN 325MG 650 MG: 325 TABLET ORAL at 19:47

## 2024-03-31 RX ADMIN — ASPIRIN 325 MG: 325 TABLET ORAL at 15:31

## 2024-03-31 RX ADMIN — IOPAMIDOL 75 ML: 755 INJECTION, SOLUTION INTRAVENOUS at 18:45

## 2024-03-31 RX ADMIN — ASPIRIN 81 MG: 81 TABLET, COATED ORAL at 17:05

## 2024-03-31 RX ADMIN — OXYCODONE 10 MG: 5 TABLET ORAL at 05:17

## 2024-03-31 RX ADMIN — ACETAMINOPHEN 325MG 650 MG: 325 TABLET ORAL at 15:31

## 2024-03-31 RX ADMIN — OXYCODONE 10 MG: 5 TABLET ORAL at 20:38

## 2024-03-31 RX ADMIN — OXYCODONE 10 MG: 5 TABLET ORAL at 00:15

## 2024-03-31 RX ADMIN — CLOPIDOGREL BISULFATE 300 MG: 75 TABLET ORAL at 15:30

## 2024-03-31 RX ADMIN — IOPAMIDOL 90 ML: 755 INJECTION, SOLUTION INTRAVENOUS at 13:33

## 2024-03-31 RX ADMIN — SODIUM CHLORIDE 1000 ML: 9 INJECTION, SOLUTION INTRAVENOUS at 15:24

## 2024-03-31 RX ADMIN — TIZANIDINE 2 MG: 2 TABLET ORAL at 08:10

## 2024-03-31 RX ADMIN — BACLOFEN 5 MG: 5 TABLET ORAL at 09:52

## 2024-03-31 RX ADMIN — MAGNESIUM HYDROXIDE 30 ML: 400 SUSPENSION ORAL at 09:52

## 2024-03-31 RX ADMIN — ACETAMINOPHEN 325MG 650 MG: 325 TABLET ORAL at 08:10

## 2024-03-31 RX ADMIN — SODIUM CHLORIDE 1000 ML: 9 INJECTION, SOLUTION INTRAVENOUS at 17:44

## 2024-03-31 ASSESSMENT — PAIN DESCRIPTION - LOCATION
LOCATION: BACK
LOCATION: BACK
LOCATION: ABDOMEN;BACK

## 2024-03-31 ASSESSMENT — PAIN DESCRIPTION - PAIN TYPE: TYPE: SURGICAL PAIN

## 2024-03-31 ASSESSMENT — PAIN SCALES - GENERAL
PAINLEVEL_OUTOF10: 8
PAINLEVEL_OUTOF10: 8
PAINLEVEL_OUTOF10: 7
PAINLEVEL_OUTOF10: 6
PAINLEVEL_OUTOF10: 7

## 2024-03-31 NOTE — PROGRESS NOTES
attempted to see pt per RRT and stroke alert. Pt was in CT. Nurse stated that pt will be moved to 1C.  Chaplains will attempt to visit pt at later time.     Electronically signed by Chaplain Dennys, on 3/31/2024 at 1:45 PM.  [facility]  [phone]

## 2024-03-31 NOTE — PLAN OF CARE
Problem: Discharge Planning  Goal: Discharge to home or other facility with appropriate resources  3/31/2024 0654 by Kierra Maurer RN  Outcome: Progressing  3/30/2024 1749 by Sera Pierce RN  Outcome: Progressing     Problem: Safety - Adult  Goal: Free from fall injury  3/31/2024 0654 by Kierra Maurer RN  Outcome: Progressing  3/30/2024 1749 by Sera Pierce RN  Outcome: Progressing     Problem: Pain  Goal: Verbalizes/displays adequate comfort level or baseline comfort level  Outcome: Progressing     Problem: ABCDS Injury Assessment  Goal: Absence of physical injury  3/31/2024 0654 by Kierra Maurer RN  Outcome: Progressing  3/30/2024 1749 by Sera Pierce RN  Outcome: Progressing

## 2024-03-31 NOTE — CONSULTS
Endovascular Neurosurgery Consult      Reason for evaluation: R CCA stenosis    SUBJECTIVE:   History of Chief Complaint:      79 y.o. female with right-sided Bell's palsy for 40 years, presbycusis, hyperlipidemia, hypertension, and chronic history of low back pain with remote lumbar surgery in the 1970s status post revision, status post DCM stimulator was admitted on 3/29/2024 for an anterior lumbar interbody fusion at L4 S1 as well as removal of her spinal cord stimulator.      On postop day 2.  At approximately 12 PM on 3/31/2024, patient's nurse noticed a new onset left facial droop.  Stroke alert was called at approximately 12:50 PM.  Upon arrival, patient's  and glucose 176.  On examination, patient had horizontal nystagmus as well as left upper extremity dysmetria with finger-nose-finger testing.  No obvious weakness was appreciated on examination.  No other focal deficits besides a chronic right facial weakness secondary to a remote right Bell's palsy.     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  MRI showed an acute right parietal stroke    Allergies  is allergic to ciprofloxacin.  Medications  Prior to Admission medications    Medication Sig Start Date End Date Taking? Authorizing Provider   oxyCODONE-acetaminophen (PERCOCET) 5-325 MG per tablet Take 1 tablet by mouth every 6 hours as needed for Pain for up to 7 days. Intended supply: 7 days. Take lowest dose possible to manage pain Max Daily Amount: 4 tablets 3/29/24 4/5/24 Yes Alberto Plasencia DO   loratadine (CLARITIN) 10 MG capsule Take 1 capsule by mouth daily    ProviderGeronimo MD   lisinopril-hydroCHLOROthiazide (PRINZIDE;ZESTORETIC) 20-25 MG per tablet Take 1 tablet by mouth daily    Geronimo Brower MD   HYDROcodone-acetaminophen (NORCO) 5-325 MG per tablet Take 1 tablet by mouth nightly. Max Daily Amount: 1 tablet    Geronimo Brower MD   diphenhydrAMINE-APAP,  use.  Family History  family history includes Heart Disease in her mother and sister; Kidney Disease in her father.    Review of Systems:  CONSTITUTIONAL:  negative for fevers, chills, fatigue and malaise    EYES:  negative for double vision, blurred vision and photophobia     HEENT:  negative for tinnitus, epistaxis and sore throat    RESPIRATORY:  negative for cough, shortness of breath, wheezing    CARDIOVASCULAR:  negative for chest pain, palpitations, syncope, edema    GASTROINTESTINAL:  negative for nausea, vomiting    GENITOURINARY:  negative for incontinence    MUSCULOSKELETAL:  negative for neck or back pain    NEUROLOGICAL:  Negative for weakness and tingling  negative for headaches and dizziness    PSYCHIATRIC:  negative for anxiety      Review of systems otherwise negative.      OBJECTIVE:     Vitals:    03/31/24 1300   BP: (!) 113/56   Pulse:    Resp:    Temp:    SpO2: 98%        General:  Gen: normal habitus, NAD  HEENT: NCAT, mucosa moist  Cvs: RRR, S1 S2 normal  Resp: symmetric unlabored breathing  Abd: s/nd/nt  Ext: no edema  Skin: no lesions seen, warm and dry    Neuro:  Gen: awake and alert, oriented x3.   Lang/speech: no aphasia or dysarthria. Follows commands.  CN: PERRL, EOMI, VFF, V1-3 intact, face symmetric, hearing intact, shoulder shrug symmetric, tongue midline  Motor: grossly 5/5 UE and LE b/l  Sense: LT intact in all 4 ext.  Coord: FTN and HTS intact b/l  DTR: deferred  Gait: deferred    NIH Stroke Scale:   1a  Level of consciousness: 0 - alert; keenly responsive   1b. LOC questions:  0 - answers both questions correctly   1c. LOC commands: 0 - performs both tasks correctly   2.  Best Gaze: 0 - normal   3. Visual: 0 - no visual loss   4. Facial Palsy: 2 - partial paralysis (total or near total paralysis of the lower face)   5a. Motor left arm: 0 - no drift, limb holds 90 (or 45) degrees for full 10 seconds   5b.  Motor right arm: 0 - no drift, limb holds 90 (or 45) degrees for full 10

## 2024-03-31 NOTE — SIGNIFICANT EVENT
RN into room at 11:45 to complete noon assessment. At that time, patient's assessment was unchanged from 8 am. RN then assessed and passed meds on remainder of assignment and upon checking back in on pt at 13:00 noted acute difficulty speaking. Patient was able to state name but nothing else, and speech was notably garbled as compared to prior assessment. Patient was moved back from chair to bed by writer and 2 other nurses, stroke alert was called, neuro team at bedside to complete NIH.

## 2024-03-31 NOTE — H&P
were discussed with Dr. Oneill At 1:59 pm on 3/31/2024.     CT HEAD WO CONTRAST    Result Date: 3/31/2024  No acute intracranial abnormality. Findings were sent to Dr. Oneill At 1:36 pm on 3/31/2024.     Assessment & Plan     Annie Beltran is a 79 y.o. female with a history of right-sided Bell's palsy, presbycusis, hyperlipidemia, hypertension, and chronic history of low back pain (remote lumbar surgery in the 1970s status post revision, status post DCM stimulator) was admitted on 3/29/2024 for an anterior lumbar interbody fusion at L4 S1 as well as removal of her spinal cord stimulator.  Patient is currently postop day 2.  At approximately 12 PM on 3/31/2024, patient's nurse noticed a new onset left facial droop.  On examination, patient had horizontal nystagmus as well as left upper extremity dysmetria with finger-nose-finger testing.  CT angiogram of the head and neck with probable occlusion of the right common carotid artery.  MRI showed a watershed infarct along the right hemisphere most notably the right parietal occipital junction as well as the right centrum semiovale.  Patient was loaded with aspirin and Plavix, loaded with 1 L normal saline, and started on maintenance fluids at 125 cc/h.  Patient admitted to the neuro ICU for close monitoring.  Plan for outpatient DSA.    Neurological  Right hemispheric punctate watershed infarct secondary to symptomatic right ICA  Extracranial atherosclerotic disease    Plan:  Neurological checks q1h  Seizure, fall, aspiration precautions  SBP goal 130-160  Status post 1 L bolus normal saline  Normal saline 125 cc/h  TSH, hemoglobin A1c, lipid panel  TTE with bubble study    Cardiovascular  Continuous cardiac telemetry    Plan:  SBP goal 100-140  Labetalol 10mg IV q4h prn     Orthopedic  Lumbar spinal stenosis s/p ALIF with PI L4-S1 and removal of spinal stimulator, DOS: 3/29/24, POD#1  Scoliosis    Plan:  Complete postop Ancef  Multimodal pain control  No chemical DVT  prophylaxis until postop day 5  Orthopedic surgery is okay with dual antiplatelet therapy        Pulmonary  Saturating well on room air    Plan:  Aspiration precautions, head of bed above 30 degrees  Incentive spirometry and deep breathing    Renal, fluid status, and electrolytes   Renal function normal  Urinary retention status post indwelling catheter placement  Mild hyponatremia  Mildly elevated AST    Plan:  Monitor daily BMP, Mg, Phos  Avoid hypotonic fluids as this can worsen cerebral edema  IVFs with NS at 125 cc/h  Urology recommending outpatient follow-up    Gastroenterology and nutrition  Diet NPO  Last BM: unknown     Plan:  Will advance diet as tolerated    Infectious disease  Current access: PIVs (placed)  Keep normothermic    Plan:  Tylenol 500mg q6h prn fever > 38.3   Daily CBC with differential  Monitor for fevers    Hematological  CBC within normal limits     Plan:  Monitor CBC daily    Endocrinological  Normoglycemia    Plan:  Check HgbA1c, TSH     Prophylaxis:  Stress ulcer: Not indicated  DVT: Bilateral SCD's.  Orthopedic surgery recommending chemoprophylaxis on postop day 5    DISPOSITION:  [x] To remain ICU  [] OK for out of ICU from Neuro Critical Care standpoint    Patient was seen and discussed with attending on record.     Abraham Campbell DO  Neuro Critical Care  Pager [phone]  3/31/2024     4:10 PM

## 2024-03-31 NOTE — PROGRESS NOTES
.  [facility]  Office: [phone]  Enoch Hou DO, Irineo Pereira DO, Teja Pedroza DO, Aleksandr Goff DO, Arsenio Gary MD, Crys Schroeder MD, Micheal Johnson MD, Ally Cordova MD,  Sathish Poon MD, Julia Smith MD, Fatimah Infante MD,  Venancio Silverman DO, Dony Kaur MD, Zach Liao MD, Zbigniew Hou DO, Lucrecia Lima MD,  Herminio Simon DO, Uzma Reyes MD, Mary Lane MD, Peace Benoit MD, Philip Perez MD,  Ismael Landa MD, Helen Ybarra MD, Mario Campuzano MD, Kaity Green MD, Ender Leslie MD, Prudencio Lewis MD, Abraham Levine DO, Saad Borjas DO, Celena Mancilla MD,  Facundo Vaughn MD, Shirley Waterhouse, CNP,  Janet Hills CNP, Bala Street, CNP,  Mariah Grissom, DNP, Delia Collins, CNP, Erinn Estrada, CNP, Renae White, CNP, Chayo Desai, CNP, Shannon Marks, PA-C, Janis Mercer, PA-C, Anya Lassiter, CNP, Alka Gregorio, CNP, Héctor Judd, CNP, Day Philip, CNP, Luda Melgar, CNP, Kizzy Mcleod, CNS, Tess Bullard, CNP, Amy Gomez CNP, Tracy Schwab, CNP         [facility]   IN-PATIENT SERVICE   [facility]    Progress Note    3/31/2024    8:12 AM    Name:   Annie Beltran  MRN:     [id number]     Acct:      [id number]   Room:   0250/0250-01   Day:  2  Admit Date:  3/29/2024  9:25 AM    PCP:   Shankar Brown MD  Code Status:  Full Code    Subjective:     C/C: lumbar spinal stenosis   Interval History Status: not changed.     Patient seen and examined at bedside this morning. Jo catheter was placed overnight due to urinary retention. Patient resting in bedside chair about to eat breakfast. Patient is passing flatus but has not had BM yet. Patient pain is well controlled    Brief History:     Patient is a 79-year-old female s/p ALIF with PI L4-S1 and removal of spinal stimulator, DOS: 3/29/24 who was admitted under Ortho surgery.  Currently POD #2    Review of Systems:     Constitutional:  negative        Labs:  Hematology:  Recent Labs     03/30/24  0637   WBC 7.0   RBC 2.52*   HGB 8.0*   HCT 25.8*   .4   MCH 31.7   MCHC 31.0   RDW 12.7      MPV 9.3     Chemistry:  Recent Labs     03/30/24  0637   *   K 4.5      CO2 18*   GLUCOSE 176*   BUN 25*   CREATININE 1.0*   ANIONGAP 16   LABGLOM 56*   CALCIUM 7.9*   No results for input(s): \"PROT\", \"LABALBU\", \"LABA1C\", \"Y5VOJSG\", \"B9TPAIO\", \"FT4\", \"TSH\", \"AST\", \"ALT\", \"LDH\", \"GGT\", \"ALKPHOS\", \"LABGGT\", \"BILITOT\", \"BILIDIR\", \"AMMONIA\", \"AMYLASE\", \"LIPASE\", \"LACTATE\", \"CHOL\", \"HDL\", \"LDLCHOLESTEROL\", \"CHOLHDLRATIO\", \"TRIG\", \"VLDL\", \"BLU90FH\", \"PHENYTOIN\", \"PHENYF\", \"URICACID\", \"POCGLU\" in the last 72 hours.  ABG:No results found for: \"POCPH\", \"PHART\", \"PH\", \"POCPCO2\", \"GJV3XLZ\", \"PCO2\", \"POCPO2\", \"PO2ART\", \"PO2\", \"POCHCO3\", \"APS2PYC\", \"HCO3\", \"NBEA\", \"PBEA\", \"BEART\", \"BE\", \"THGBART\", \"THB\", \"MSP0QGI\", \"DBVN8DXT\", \"W7ZYXFDW\", \"O2SAT\", \"FIO2\"  Lab Results   Component Value Date/Time    SPECIAL NOT REPORTED 04/20/2012 09:33 AM     Lab Results   Component Value Date/Time    CULTURE ESCHERICHIA COLI >100,000 CFU/ML (A) 11/29/2023 12:01 PM       Radiology:  No results found.    Physical Examination:        General appearance:  alert, cooperative and no distress  Mental Status:  oriented to person, place and time and normal affect  Lungs:  clear to auscultation bilaterally, normal effort  Heart:  regular rate and rhythm, no murmur  Abdomen:  soft, tenderness at surgical site, dressing is C/D/I, nondistended, normal bowel sounds, no masses, hepatomegaly, splenomegaly  Extremities:  no edema, redness, tenderness in the calves  Skin:  no gross lesions, rashes, induration  Back: Dressing C/D/I, tender on palpation     Assessment:        Hospital Problems             Last Modified POA    * (Principal) Status post lumbar spinal fusion 3/29/2024 Yes    Status post lumbar spine operation 3/29/2024 Yes       Plan:        Lumbar spinal stenosis S/P anterior

## 2024-03-31 NOTE — PROGRESS NOTES
[facility] [place]    Shift date: 3/31/2024  Shift day: Sunday   Shift # 2    Room # 0143/0143-01   Name: Annie Beltran                Mosque: Yarsanism   Place of Gnosticist: N/A    Referral:  Spiritual Consult    Admit Date & Time: 3/29/2024  9:25 AM    Assessment:  Annie Beltran is a 79 y.o. female in the hospital because of a stroke. Upon entering the room writer observes I see the patient calm and coping. Nurse was giving her medical care.      Intervention:  Writer introduced self and title as spiritual care provider Writer offered space for patient  to express feelings, needs, and concerns and provided a ministry presence.  provided active listening and sustaining presence/ministry of presence;discussed meaning/purpose of illness with patient and family.    Outcome:   engaged in conversation with patient and family. Both are coping. Family would like to see their mother as soon as allowed.    Plan:  Chaplains will remain available to offer spiritual and emotional support as needed.      Electronically signed by ALTHEA MCNEIL, Intern, on 3/31/2024 at 5:00 PM.  [facility]  [phone]      03/31/24 1642   Encounter Summary   Encounter Overview/Reason  Spiritual/Emotional Needs   Service Provided For: Patient;Family   Referral/Consult From: Nurse   Begin Time 1610   End Time  1630   Total Time Calculated 20 min   Spiritual/Emotional needs   Type Spiritual Support   Assessment/Intervention/Outcome   Assessment Calm;Coping   Intervention Active listening;Sustaining Presence/Ministry of presence;Discussed meaning/purpose   Outcome Engaged in conversation;Coping

## 2024-03-31 NOTE — SIGNIFICANT EVENT
Rapid response called 13:00 pm  Patient exhibiting stroke like symptoms with left sided weakness and dysarthria. Patient has a history of [place] Palsy   Patient complains of burred vision and lightheadedness   Stoke alert called and Neuro arrived at bedside at 13:04 pm.  Patient is POD# 2 anterior lumbar fusion and removal of spinal stimulator, no chemical AC recommended until POD#5 per Ortho   STAT CT head and CTA head and neck were ordered as well as CT PE and ECHO   Patients vitals were stable and patient alert     Prudencio Lewis MD  3/31/2024 1:19 PM

## 2024-03-31 NOTE — CONSULTS
flush 0.9 % injection 5-40 mL, 5-40 mL, IntraVENous, 2 times per day  sodium chloride flush 0.9 % injection 5-40 mL, 5-40 mL, IntraVENous, PRN  0.9 % sodium chloride infusion, , IntraVENous, PRN  acetaminophen (TYLENOL) tablet 650 mg, 650 mg, Oral, Q6H  oxyCODONE (ROXICODONE) immediate release tablet 5 mg, 5 mg, Oral, Q4H PRN **OR** oxyCODONE (ROXICODONE) immediate release tablet 10 mg, 10 mg, Oral, Q4H PRN  tiZANidine (ZANAFLEX) tablet 2 mg, 2 mg, Oral, Q6H PRN  HYDROmorphone (DILAUDID) injection 0.5 mg, 0.5 mg, IntraVENous, Q3H PRN    PHYSICAL EXAM:       BP (!) 113/56   Pulse 67   Temp 98 °F (36.7 °C) (Oral)   Resp 16   Ht 1.499 m (4' 11\")   Wt 66.2 kg (146 lb)   SpO2 98%   BMI 29.49 kg/m²     GEN: NAD, visibly anxious  CVS: Palpable pulses throughout, no JVD, no lower extremity edema  CHEST: No signs of  distress, saturating well on room air  ABD: Soft, NTTP  NEURO:     MENTAL STATUS: AAOx3    LANG/SPEECH: Fluent, intact naming, repetition & comprehension.  No evidence of scanning speech    CRANIAL NERVES:    II: Pupils equal and reactive, normal visual field testing    III, IV, VI: EOM intact, there is no gaze preference, but there is spontaneous nystagmus able to cross midline to both directions    V: normal    VII: Chronic right facial asymmetry    VIII: normal hearing to speech    MOTOR: 5/5 in both upper and lower extremities    REFLEXES: 2/4 throughout, bilateral flexor plantars    SENSORY: Normal to soft touch throughout    COORD: Dysmetria with right finger nose testing    Initial NIHSS  Time Performed: 12:55 PM    1a.  Level of consciousness:  0 - alert; keenly responsive  1b.  Level of consciousness questions:  0 - answers both questions correctly  1c.  Level of consciousness questions:  0 - performs both tasks correctly  2.    Best Gaze:  0 - normal  3.    Visual:  0 - no visual loss  4.    Facial Palsy:  0 - normal symmetric movement  5a.  Motor left arm:  0 - no drift, limb holds 90 (or 45)  MRI and recommended thoracic CT spine.      Suspected left cerebellar infarct, etiology may be iCAD versus A-fib      Last Known Well (date and time)   12 PM 3/31/2024   Candidate for IV Tenecteplase therapy    No   [x] due to the following exclusion criteria: recent surgery    Candidate for Thrombectomy   No  [x] due to the following exclusion criteria: No LVO on Imaging      Plan:       Recommend full dose aspirin if cleared by primary team  Obtain TTE with bubble study  Recommend implantable loop recorder to rule out underlying dysrhythmia  High intensity statin therapy  Lipid panel, hemoglobin A1c, TSH  Telemetry  PT/OT  Monitor under neuro ICU for 48 hours  Outpatient DSA  Load with aspirin 325 and plavix 300 mg  Start ASA 81 mg qD tomorrow as well as Plavix 75 mg qD  SBP goal between 130 to 160    Discussed with MD Abraham Johnson DO   3/31/2024  2:08 PM

## 2024-03-31 NOTE — CONSULTS
Sundeep Denney, Donnell, Luis, Aj, Abdifatah, Mariam, & Harley  Urology Consultation      Patient:  Annie Beltran  MRN: [id number]  YOB: 1945    CHIEF COMPLAINT: Urinary retention    HISTORY OF PRESENT ILLNESS:   The patient is a 79 y.o. female who presents with status post lumbar fusion removal of spinal stimulator postoperative day 2.  Patient is not very ambulatory at this time.  Only ambulated about 100 feet with a rolling walker yesterday.  Last bowel movement was Thursday but passing flatus.  Creatinine 1.  No recent abdominal imaging.    Denies any urologic history including hematuria dysuria or trouble urinating before this.  Denies any previous issues with retention.    Patient's old records, notes and chart reviewed and summarized above.    Past Medical History:    Past Medical History:   Diagnosis Date    Arthritis     Bell's palsy     Hearing loss     Right, does not wear hearing aides    Hyperlipidemia     Hypertension     Lumbar disc disease     Seasonal allergies     Under care of service provider     PCP - Dr. HARSHAD Brown     Wears glasses        Past Surgical History:    Past Surgical History:   Procedure Laterality Date    APPENDECTOMY      BACK SURGERY      ,     SECTION      COLONOSCOPY      HUMERUS CLOSED REDUCTION Right 2021    HUMERUS CLOSED REDUCTION performed by Fabien Diana MD at [facility] OR    NERVE BLOCK LUMB FACET LEVEL 1 BILATERAL Right 2017    LUMBAR FACET L-4-5,L5-S1 performed by Kofi Hemphill MD at [facility] OR    PAIN MANAGEMENT PROCEDURE Right 2016    SI pain injection- Dr. Hemphill     PAIN MANAGEMENT PROCEDURE Right 2016    S1 RFA    PAIN MANAGEMENT PROCEDURE  2017    pain injection    PAIN MANAGEMENT PROCEDURE  2020    Pain/nerve stimulator    SHOULDER ARTHROPLASTY Right 10/18/2021    SHOULDER TOTAL ARTHROPLASTY-REVERSE performed by Fabien Diana MD at [facility] OR    SPINAL CORD STIMULATOR REMOVAL  2024    ANTERIOR  rashes  Hematologic/Lymphatic: No easy bruising  Musculoskeletal: No muscle pains  Neurologic: No weakness in the extremities.   Psychiatric: No depression or suicidal thoughts.  Endocrine: No heat or cold intolerances.  Allergic/Immunologic: No current seasonal allergies; no skin hives.      Physical Exam:    This a 79 y.o. male   Vitals:    03/31/24 0547   BP:    Pulse:    Resp: 15   Temp:    SpO2:      Constitutional: Patient in no acute distress.  Psych: Mood and affect normal.  Skin: No rashes or bruising present.  Lungs: Respiratory effort normal.  Cardiovascular:  Regular rhythm.  Abdomen: Soft, non-tender, non-distended.   Jo catheter in place    Labs:  Recent Labs     03/30/24  0637   WBC 7.0   HGB 8.0*   HCT 25.8*   .4        Recent Labs     03/30/24  0637   *   K 4.5      CO2 18*   BUN 25*   CREATININE 1.0*       No results for input(s): \"COLORU\", \"PHUR\", \"LABCAST\", \"WBCUA\", \"RBCUA\", \"MUCUS\", \"TRICHOMONAS\", \"YEAST\", \"BACTERIA\", \"CLARITYU\", \"SPECGRAV\", \"LEUKOCYTESUR\", \"UROBILINOGEN\", \"BILIRUBINUR\", \"BLOODU\" in the last 72 hours.    Invalid input(s): \"NITRATE\", \"GLUCOSEUKETONESUAMORPHOUS\"    Culture Results:  @[facility]@      -----------------------------------------------------------------  Imaging Results:  FLUORO FOR SURGICAL PROCEDURES    Result Date: 3/29/2024  Radiology exam is complete. No Radiologist dictation. Please follow up with ordering provider.       Assessment and Plan   Impression:    79-year-old female  Urinary retention      Plan:   -Retention likely multifactorial including lack of ambulation, irregular bowel movements, possible narcotic use and decreased mobility being in the hospital.  -Given that retention for was for a higher volume at 750 cc and plan for discharge tomorrow morning Jo catheter would only have been in for 1 day prior to discharge.  Would recommend outpatient follow-up for Jo catheter removal in 4 to 5 days.  If she is going to a

## 2024-03-31 NOTE — PROGRESS NOTES
Progress Note    Patient:  Annie Beltran  YOB: 1945     79 y.o. female    Subjective:  Patient seen and examined at bedside this morning.  Patient seems mildly disoriented/confused this morning.  Otherwise she has no new complaints or concerns per patient this morning. No acute issues overnight per nursing. Pain well-controlled, though she is sore. Denies: fever/chills, HA, CP, SOB, N/V, dysuria, or numbness/tingling in extremities.  No bowel movement but passing flatus.  Had urinary retention throughout the day yesterday and Jo was placed.  PT: Ambulated 100 feet with rolling walker yesterday.    Objective:   Vitals:    03/31/24 0015   BP: (!) 120/57   Pulse: 91   Resp: 15   Temp: 98 °F (36.7 °C)   SpO2: 99%     Gen: NAD, cooperative   Abdomen: Dressings are C/D/I without saturation or strike through. Appropriately tender to palpation.  Back: Dressings C/D/I without strikethrough or saturation. Appropriately tender to palpation.     BUE: 2+ rad pulse w/ BCR. Median/Radial/Ulnar/AIN/PIN motor intact. C4-T1 SILT.   BLE: 2+ DP/PT pulses w/ BCR. TA/EHL/FHL/GSC gross motor intact. L3-S1 SILT    Recent Labs     03/30/24  0637   WBC 7.0   HGB 8.0*   HCT 25.8*      *   K 4.5   BUN 25*   CREATININE 1.0*   GLUCOSE 176*       Impression: 79 y.o. female who is being seen for    - Lumbar spinal stenosis:     s/p ALIF with PI L4-S1 and removal of spinal stimulator, DOS: 3/29/24, POD#1    Plan: :  -No further orthopedic intervention planned at this time  -AAT  -Complete post op Ancef  -Pain control: multimodal; avoid NSAIDs  -Tolerating PO intake well  -Ice (20 min, 1 hour off) for edema/pain control  -Encourage IS and deep breathing  -DVT ppx: encourage being up and OOB & EPC, no chemical AC until POD#5  -PT/OT  -Please page DO ortho with any questions    Duglas Choi MD, MD  PGY-1 Orthopedic Surgery  6:52 AM 3/31/2024

## 2024-04-01 ENCOUNTER — APPOINTMENT (OUTPATIENT)
Dept: CT IMAGING | Age: 79
DRG: 459 | End: 2024-04-01
Attending: STUDENT IN AN ORGANIZED HEALTH CARE EDUCATION/TRAINING PROGRAM
Payer: MEDICARE

## 2024-04-01 PROBLEM — I26.99 OTHER PULMONARY EMBOLISM WITHOUT ACUTE COR PULMONALE (HCC): Status: ACTIVE | Noted: 2024-04-01

## 2024-04-01 PROBLEM — G89.18 POST-OP PAIN: Status: ACTIVE | Noted: 2024-04-01

## 2024-04-01 LAB
ALBUMIN SERPL-MCNC: 3.2 G/DL (ref 3.5–5.2)
ALBUMIN/GLOB SERPL: 2 {RATIO} (ref 1–2.5)
ALP SERPL-CCNC: 90 U/L (ref 35–104)
ALT SERPL-CCNC: 12 U/L (ref 10–35)
ANION GAP SERPL CALCULATED.3IONS-SCNC: 9 MMOL/L (ref 9–16)
AST SERPL-CCNC: 52 U/L (ref 10–35)
BASOPHILS # BLD: 0 K/UL (ref 0–0.2)
BASOPHILS NFR BLD: 0 % (ref 0–2)
BILIRUB SERPL-MCNC: 0.3 MG/DL (ref 0–1.2)
BUN SERPL-MCNC: 17 MG/DL (ref 8–23)
CALCIUM SERPL-MCNC: 7.5 MG/DL (ref 8.6–10.4)
CHLORIDE SERPL-SCNC: 106 MMOL/L (ref 98–107)
CHOLEST SERPL-MCNC: 107 MG/DL (ref 0–199)
CHOLESTEROL/HDL RATIO: 3
CO2 SERPL-SCNC: 19 MMOL/L (ref 20–31)
CREAT SERPL-MCNC: 0.8 MG/DL (ref 0.5–0.9)
EOSINOPHIL # BLD: 0 K/UL (ref 0–0.4)
EOSINOPHILS RELATIVE PERCENT: 0 % (ref 1–4)
ERYTHROCYTE [DISTWIDTH] IN BLOOD BY AUTOMATED COUNT: 13.3 % (ref 11.8–14.4)
ERYTHROCYTE [DISTWIDTH] IN BLOOD BY AUTOMATED COUNT: 14.6 % (ref 11.8–14.4)
EST. AVERAGE GLUCOSE BLD GHB EST-MCNC: 111 MG/DL
FIBRINOGEN PPP-MCNC: 544 MG/DL (ref 203–521)
GFR SERPL CREATININE-BSD FRML MDRD: 70 ML/MIN/1.73M2
GLUCOSE SERPL-MCNC: 100 MG/DL (ref 74–99)
HBA1C MFR BLD: 5.5 % (ref 4–6)
HCT VFR BLD AUTO: 19.1 % (ref 36.3–47.1)
HCT VFR BLD AUTO: 21 % (ref 36.3–47.1)
HCT VFR BLD AUTO: 21.8 % (ref 36.3–47.1)
HCT VFR BLD AUTO: 22.7 % (ref 36.3–47.1)
HDLC SERPL-MCNC: 31 MG/DL
HGB BLD-MCNC: 6.1 G/DL (ref 11.9–15.1)
HGB BLD-MCNC: 6.2 G/DL (ref 11.9–15.1)
HGB BLD-MCNC: 7 G/DL (ref 11.9–15.1)
HGB BLD-MCNC: 7.2 G/DL (ref 11.9–15.1)
IMM GRANULOCYTES # BLD AUTO: 0.17 K/UL (ref 0–0.3)
IMM GRANULOCYTES NFR BLD: 2 %
LDLC SERPL CALC-MCNC: 53 MG/DL (ref 0–100)
LYMPHOCYTES NFR BLD: 0.96 K/UL (ref 1–4.8)
LYMPHOCYTES RELATIVE PERCENT: 11 % (ref 24–44)
MCH RBC QN AUTO: 31 PG (ref 25.2–33.5)
MCH RBC QN AUTO: 31.5 PG (ref 25.2–33.5)
MCHC RBC AUTO-ENTMCNC: 29.5 G/DL (ref 28.4–34.8)
MCHC RBC AUTO-ENTMCNC: 32.1 G/DL (ref 28.4–34.8)
MCV RBC AUTO: 106.6 FL (ref 82.6–102.9)
MCV RBC AUTO: 96.5 FL (ref 82.6–102.9)
MONOCYTES NFR BLD: 1.22 K/UL (ref 0.1–0.8)
MONOCYTES NFR BLD: 14 % (ref 1–7)
MORPHOLOGY: ABNORMAL
NEUTROPHILS NFR BLD: 73 % (ref 36–66)
NEUTS SEG NFR BLD: 6.35 K/UL (ref 1.8–7.7)
NRBC BLD-RTO: 0 PER 100 WBC
NRBC BLD-RTO: 0 PER 100 WBC
PLATELET # BLD AUTO: 100 K/UL (ref 138–453)
PLATELET # BLD AUTO: 115 K/UL (ref 138–453)
PMV BLD AUTO: 9.3 FL (ref 8.1–13.5)
PMV BLD AUTO: 9.5 FL (ref 8.1–13.5)
POTASSIUM SERPL-SCNC: 4 MMOL/L (ref 3.7–5.3)
PROT SERPL-MCNC: 5.2 G/DL (ref 6.6–8.7)
RBC # BLD AUTO: 1.97 M/UL (ref 3.95–5.11)
RBC # BLD AUTO: 2.26 M/UL (ref 3.95–5.11)
SODIUM SERPL-SCNC: 134 MMOL/L (ref 136–145)
TRIGL SERPL-MCNC: 115 MG/DL
VLDLC SERPL CALC-MCNC: 23 MG/DL
WBC OTHER # BLD: 8.7 K/UL (ref 3.5–11.3)
WBC OTHER # BLD: 9.4 K/UL (ref 3.5–11.3)

## 2024-04-01 PROCEDURE — 36415 COLL VENOUS BLD VENIPUNCTURE: CPT

## 2024-04-01 PROCEDURE — 85025 COMPLETE CBC W/AUTO DIFF WBC: CPT

## 2024-04-01 PROCEDURE — 2580000003 HC RX 258

## 2024-04-01 PROCEDURE — 6370000000 HC RX 637 (ALT 250 FOR IP)

## 2024-04-01 PROCEDURE — 85027 COMPLETE CBC AUTOMATED: CPT

## 2024-04-01 PROCEDURE — P9016 RBC LEUKOCYTES REDUCED: HCPCS

## 2024-04-01 PROCEDURE — 6360000004 HC RX CONTRAST MEDICATION

## 2024-04-01 PROCEDURE — 36430 TRANSFUSION BLD/BLD COMPNT: CPT

## 2024-04-01 PROCEDURE — 2500000003 HC RX 250 WO HCPCS

## 2024-04-01 PROCEDURE — 2500000003 HC RX 250 WO HCPCS: Performed by: REGISTERED NURSE

## 2024-04-01 PROCEDURE — 85018 HEMOGLOBIN: CPT

## 2024-04-01 PROCEDURE — 2580000003 HC RX 258: Performed by: PSYCHIATRY & NEUROLOGY

## 2024-04-01 PROCEDURE — 86900 BLOOD TYPING SEROLOGIC ABO: CPT

## 2024-04-01 PROCEDURE — 85384 FIBRINOGEN ACTIVITY: CPT

## 2024-04-01 PROCEDURE — 6370000000 HC RX 637 (ALT 250 FOR IP): Performed by: STUDENT IN AN ORGANIZED HEALTH CARE EDUCATION/TRAINING PROGRAM

## 2024-04-01 PROCEDURE — 99232 SBSQ HOSP IP/OBS MODERATE 35: CPT | Performed by: PSYCHIATRY & NEUROLOGY

## 2024-04-01 PROCEDURE — 71260 CT THORAX DX C+: CPT

## 2024-04-01 PROCEDURE — 80053 COMPREHEN METABOLIC PANEL: CPT

## 2024-04-01 PROCEDURE — 80061 LIPID PANEL: CPT

## 2024-04-01 PROCEDURE — 86850 RBC ANTIBODY SCREEN: CPT

## 2024-04-01 PROCEDURE — 2000000000 HC ICU R&B

## 2024-04-01 PROCEDURE — 30233N1 TRANSFUSION OF NONAUTOLOGOUS RED BLOOD CELLS INTO PERIPHERAL VEIN, PERCUTANEOUS APPROACH: ICD-10-PCS | Performed by: STUDENT IN AN ORGANIZED HEALTH CARE EDUCATION/TRAINING PROGRAM

## 2024-04-01 PROCEDURE — 86901 BLOOD TYPING SEROLOGIC RH(D): CPT

## 2024-04-01 PROCEDURE — 6370000000 HC RX 637 (ALT 250 FOR IP): Performed by: PSYCHIATRY & NEUROLOGY

## 2024-04-01 PROCEDURE — 6360000002 HC RX W HCPCS: Performed by: REGISTERED NURSE

## 2024-04-01 PROCEDURE — 2580000003 HC RX 258: Performed by: REGISTERED NURSE

## 2024-04-01 PROCEDURE — 86920 COMPATIBILITY TEST SPIN: CPT

## 2024-04-01 PROCEDURE — 6360000002 HC RX W HCPCS

## 2024-04-01 PROCEDURE — 92523 SPEECH SOUND LANG COMPREHEN: CPT

## 2024-04-01 PROCEDURE — 74177 CT ABD & PELVIS W/CONTRAST: CPT

## 2024-04-01 PROCEDURE — 6360000002 HC RX W HCPCS: Performed by: PSYCHIATRY & NEUROLOGY

## 2024-04-01 PROCEDURE — 85014 HEMATOCRIT: CPT

## 2024-04-01 RX ORDER — PHENYLEPHRINE HCL IN 0.9% NACL 50MG/250ML
10-300 PLASTIC BAG, INJECTION (ML) INTRAVENOUS CONTINUOUS
Status: DISCONTINUED | OUTPATIENT
Start: 2024-04-01 | End: 2024-04-02

## 2024-04-01 RX ORDER — MIDODRINE HYDROCHLORIDE 5 MG/1
5 TABLET ORAL
Status: DISCONTINUED | OUTPATIENT
Start: 2024-04-01 | End: 2024-04-04

## 2024-04-01 RX ORDER — 0.9 % SODIUM CHLORIDE 0.9 %
500 INTRAVENOUS SOLUTION INTRAVENOUS ONCE
Status: COMPLETED | OUTPATIENT
Start: 2024-04-01 | End: 2024-04-01

## 2024-04-01 RX ORDER — LABETALOL HYDROCHLORIDE 5 MG/ML
10 INJECTION, SOLUTION INTRAVENOUS EVERY 4 HOURS PRN
Status: DISCONTINUED | OUTPATIENT
Start: 2024-04-01 | End: 2024-04-09 | Stop reason: HOSPADM

## 2024-04-01 RX ORDER — SODIUM CHLORIDE 9 MG/ML
INJECTION, SOLUTION INTRAVENOUS PRN
Status: DISCONTINUED | OUTPATIENT
Start: 2024-04-01 | End: 2024-04-09 | Stop reason: HOSPADM

## 2024-04-01 RX ORDER — NOREPINEPHRINE BITARTRATE 0.06 MG/ML
1-100 INJECTION, SOLUTION INTRAVENOUS CONTINUOUS
Status: DISCONTINUED | OUTPATIENT
Start: 2024-04-01 | End: 2024-04-01

## 2024-04-01 RX ADMIN — ONDANSETRON 4 MG: 2 INJECTION INTRAMUSCULAR; INTRAVENOUS at 00:38

## 2024-04-01 RX ADMIN — ACETAMINOPHEN 325MG 650 MG: 325 TABLET ORAL at 20:16

## 2024-04-01 RX ADMIN — OXYCODONE 5 MG: 5 TABLET ORAL at 20:15

## 2024-04-01 RX ADMIN — MIDODRINE HYDROCHLORIDE 5 MG: 5 TABLET ORAL at 11:17

## 2024-04-01 RX ADMIN — Medication 5 MCG/MIN: at 05:04

## 2024-04-01 RX ADMIN — ACETAMINOPHEN 325MG 650 MG: 325 TABLET ORAL at 15:51

## 2024-04-01 RX ADMIN — Medication 3 MCG/MIN: at 08:19

## 2024-04-01 RX ADMIN — SODIUM CHLORIDE: 9 INJECTION, SOLUTION INTRAVENOUS at 21:29

## 2024-04-01 RX ADMIN — ROSUVASTATIN CALCIUM 20 MG: 20 TABLET, FILM COATED ORAL at 20:16

## 2024-04-01 RX ADMIN — SODIUM CHLORIDE, PRESERVATIVE FREE 10 ML: 5 INJECTION INTRAVENOUS at 20:16

## 2024-04-01 RX ADMIN — ACETAMINOPHEN 325MG 650 MG: 325 TABLET ORAL at 02:44

## 2024-04-01 RX ADMIN — Medication 10 MG: at 23:40

## 2024-04-01 RX ADMIN — IOPAMIDOL 75 ML: 755 INJECTION, SOLUTION INTRAVENOUS at 11:52

## 2024-04-01 RX ADMIN — Medication 5 MCG/MIN: at 02:37

## 2024-04-01 RX ADMIN — SENNOSIDES 8.8 MG: 8.8 LIQUID ORAL at 20:16

## 2024-04-01 RX ADMIN — HYDROMORPHONE HYDROCHLORIDE 0.5 MG: 1 INJECTION, SOLUTION INTRAMUSCULAR; INTRAVENOUS; SUBCUTANEOUS at 09:30

## 2024-04-01 RX ADMIN — OXYCODONE 10 MG: 5 TABLET ORAL at 02:09

## 2024-04-01 RX ADMIN — SODIUM CHLORIDE, PRESERVATIVE FREE 20 MG: 5 INJECTION INTRAVENOUS at 17:28

## 2024-04-01 RX ADMIN — HYDROMORPHONE HYDROCHLORIDE 0.5 MG: 1 INJECTION, SOLUTION INTRAMUSCULAR; INTRAVENOUS; SUBCUTANEOUS at 23:37

## 2024-04-01 RX ADMIN — ACETAMINOPHEN 325MG 650 MG: 325 TABLET ORAL at 08:20

## 2024-04-01 RX ADMIN — SODIUM CHLORIDE 500 ML: 9 INJECTION, SOLUTION INTRAVENOUS at 01:01

## 2024-04-01 RX ADMIN — OXYCODONE 5 MG: 5 TABLET ORAL at 13:02

## 2024-04-01 RX ADMIN — HYDROMORPHONE HYDROCHLORIDE 0.5 MG: 1 INJECTION, SOLUTION INTRAMUSCULAR; INTRAVENOUS; SUBCUTANEOUS at 15:51

## 2024-04-01 RX ADMIN — SODIUM CHLORIDE, PRESERVATIVE FREE 10 ML: 5 INJECTION INTRAVENOUS at 08:21

## 2024-04-01 RX ADMIN — PANTOPRAZOLE SODIUM 40 MG: 40 TABLET, DELAYED RELEASE ORAL at 08:20

## 2024-04-01 RX ADMIN — SENNOSIDES 8.8 MG: 8.8 LIQUID ORAL at 08:20

## 2024-04-01 ASSESSMENT — PAIN SCALES - GENERAL
PAINLEVEL_OUTOF10: 4
PAINLEVEL_OUTOF10: 3
PAINLEVEL_OUTOF10: 5
PAINLEVEL_OUTOF10: 4
PAINLEVEL_OUTOF10: 7
PAINLEVEL_OUTOF10: 4
PAINLEVEL_OUTOF10: 4
PAINLEVEL_OUTOF10: 2
PAINLEVEL_OUTOF10: 7
PAINLEVEL_OUTOF10: 2
PAINLEVEL_OUTOF10: 10
PAINLEVEL_OUTOF10: 10
PAINLEVEL_OUTOF10: 3
PAINLEVEL_OUTOF10: 7
PAINLEVEL_OUTOF10: 3
PAINLEVEL_OUTOF10: 7

## 2024-04-01 ASSESSMENT — PAIN DESCRIPTION - LOCATION
LOCATION: ABDOMEN;BACK
LOCATION: ABDOMEN
LOCATION: BACK

## 2024-04-01 ASSESSMENT — PAIN DESCRIPTION - ORIENTATION: ORIENTATION: MID

## 2024-04-01 NOTE — PROGRESS NOTES
Daily Progress Note  Neuro Critical Care    Patient Name: Annie Beltran  Patient : 1945  Room/Bed: 0130/0130-01  Code Status: Full  Allergies:   Allergies   Allergen Reactions    Ciprofloxacin Swelling       CHIEF COMPLAINT:      Left leg weakness, abdominal pain      INTERVAL HISTORY    Initial Presentation (Admitted 3/29/2024):  The patient is a 79 y.o. female who presents with right-sided Bell's palsy, presbycusis, hyperlipidemia, hypertension, and chronic history of low back pain with remote lumbar surgery in the 1970s status post revision, status post DCM stimulator was admitted on 3/29/2024 for an anterior lumbar interbody fusion at L4 S1 as well as removal of her spinal cord stimulator.  Patient is currently postop day 2.  At approximately 12 PM on 3/31/2024, patient's nurse noticed a new onset left facial droop.      Stroke alert was called at approximately 12:50 PM.  Upon arrival, patient's  and glucose 176.  On examination, patient had horizontal nystagmus as well as left upper extremity dysmetria with finger-nose-finger testing.  No obvious weakness was appreciated on examination.  No other focal deficits besides a chronic right facial weakness secondary to a remote right Bell's palsy.  Patient underwent stat CT head without any acute intracranial abnormalities.  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  Patient was then taken for stat MRI to confirm stroke etiology.  Given recent removal of DCM stimulator, patient also underwent a stat abdominal x-ray prior to acquisition of MR imaging.      Patient's MRI showed a watershed infarct along the right hemisphere most notably the right parietal occipital junction as well as the right centrum semiovale.  Patient was loaded with aspirin and Plavix, loaded with 1 L normal saline, and started on maintenance fluids at 125 cc/h.  Patient admitted to the neuro ICU for

## 2024-04-01 NOTE — PROGRESS NOTES
Progress Note    Patient:  Annie Beltran  YOB: 1945     79 y.o. female    Subjective:  Patient seen and examined at bedside this morning. No new complaints or concerns per patient this morning. No acute issues overnight per nursing.  Patient did require a stroke alert yesterday afternoon.  Patient was seen evaluated by stroke team, and was transferred to the neurocritical care unit.  MRI at that time showed a watershed infarct along the right hemisphere.  Patient has no strength or sensory focal deficits noted.  However she does have left upper extremity dysmetria.  Patient's pain is well-controlled on her current regimen.  She is still mildly confused however she is oriented this morning. Denies: fever/chills, HA, CP, SOB, N/V, dysuria, or numbness/tingling in extremities.  No BM, however passing flatus.  Jo was placed due to urinary retention, urology following. PT: Did not see or evaluate patient yesterday.    Objective:   Vitals:    04/01/24 0410   BP: 124/62   Pulse: 94   Resp: (!) 8   Temp: 98.8 °F (37.1 °C)   SpO2: 99%     Gen: NAD, cooperative     Abdomen: Dressings are C/D/I without saturation or strike through. Appropriately tender to palpation.  Back: Dressings C/D/I without strikethrough or saturation. Appropriately tender to palpation.     BUE: 2+ rad pulse w/ BCR. Median/Radial/Ulnar/AIN/PIN motor intact. C4-T1 SILT.   BLE: 2+ DP/PT pulses w/ BCR. TA/EHL/FHL/GSC gross motor intact. L3-S1 SILT    Recent Labs     03/31/24  1535 04/01/24  0322 04/01/24  0419   WBC  --  8.7  --    HGB  --  6.2* 6.1*   HCT  --  21.0* 19.1*   PLT  --  100*  --    INR 1.0  --   --    * 134*  --    K 4.3 4.0  --    BUN 24* 17  --    CREATININE 1.0* 0.8  --    GLUCOSE 104* 100*  --        Meds:  DVT: Lovenox, aspirin, Plavix  See rec for complete list    Impression: 79 y.o. female who is being seen for     - Lumbar spinal stenosis:     s/p ALIF with PI L4-S1 and removal of spinal stimulator,

## 2024-04-01 NOTE — PROGRESS NOTES
SLP ALL NOTES  Facility/Department: [address] NEURO ICU  Initial Speech/Language/Cognitive Assessment    NAME: Annie Beltran  : 1945   MRN: [id number]  ADMISSION DATE: 3/29/2024  ADMITTING DIAGNOSIS: has Lumbar post-laminectomy syndrome; Sacroiliitis, not elsewhere classified (HCC); Lumbosacral spondylosis without myelopathy; Shoulder joint dislocation; Shoulder dislocation, right, initial encounter; Hypokalemia; Hyponatremia; Acute hyponatremia; Status post reverse total shoulder replacement; Status post lumbar spinal fusion; Status post lumbar spine operation; and Cerebral infarction, watershed distribution, unilateral, acute (HCC) on their problem list.      Date of Eval: 2024   Evaluating Therapist: CLARY Rothman    RECENT RESULTS  CT OF HEAD/MRI: IMPRESSION:  Scattered small acute to subacute infarctions in the right parietooccipital  lobes and mildly in the posterior right frontal lobe.     Moderate chronic microvascular disease.    Primary Complaint: The patient is a 79 y.o. female who presents with right-sided Bell's palsy, presbycusis, hyperlipidemia, hypertension, and chronic history of low back pain with remote lumbar surgery in the 1970s status post revision, status post DCM stimulator was admitted on 3/29/2024 for an anterior lumbar interbody fusion at L4 S1 as well as removal of her spinal cord stimulator.  Patient is currently postop day 2.  At approximately 12 PM on 3/31/2024, patient's nurse noticed a new onset left facial droop.  Stroke alert was called at approximately 12:50 PM.  Upon arrival, patient's  and glucose 176.  On examination, patient had horizontal nystagmus as well as left upper extremity dysmetria with finger-nose-finger testing.  No obvious weakness was appreciated on examination.  No other focal deficits besides a chronic right facial weakness secondary to a remote right Bell's palsy.  Patient underwent stat CT head without any acute intracranial            Electronically signed by CLARY Rothman on 4/1/2024 at 10:56 AM

## 2024-04-01 NOTE — OP NOTE
Operative Note      Patient: Annie Beltran  YOB: 1945  MRN: [id number]    Date of Procedure: 3/29/2024    Pre-Op Diagnosis Codes:     * Spinal instability and spondylosis L4-5 and L5-S1    Post-Op Diagnosis: Same       Procedure(s):  ANTERIOR LUMBAR INTERBODY FUSION L4-5 & L5-S1 [78387, 56934 62M] WITH ANTERIOR APPROACH (BY DR REARDON)  INSERTION OF INTERBODY ([employer] ANTERALIGN) CAGES L4-5 & L5-S1 [22853 x2]  MAZOR ROBOTIC-ASSISTED POSTERIOR INSTRUMENTATION L4-S1 [15319]  USE OF MORSELIZED ALLOGRAFT [07633]  SPINAL CORD STIMULATOR (LEADS & BATTERY) REMOVAL [13036]  USE OF STEREOTACTIC ([employer] ROBOT) NAVIGATION [09948]    Surgeon(s):  Earl Verma, Chandu Castillo DO    Assistant:   Resident: Ten Quick DO    Anesthesia: General    Estimated Blood Loss (mL): 1000    Fluids: 3500 cc crystalloid, 375 auto-transfusion (Cell Saver), 500 cc albumin    Complications: none    Specimens:   * No specimens in log *    Implants:  Implant Name Type Inv. Item Serial No.  Lot No. LRB No. Used Action   KIT BNE GRFT XSM 1.4CC RHBMP-2 ABSRB CLLGN SPNG INFUSE - [id number]  KIT BNE GRFT XSM 1.4CC RHBMP-2 ABSRB CLLGN SPNG INFUSE  [employer] SPINALGRAFT TECH-WD [id number] N/A 1 Implanted   GRAFT BNE SUB 5CC DEMIN BNE MTRX PTTY GRFTON - [id number]  GRAFT BNE SUB 5CC DEMIN BNE MTRX PTTY GRFTON [id number] [employer] SPINALGRAFT TECH-WD  N/A 1 Implanted   SCREW SPNL LCK 5X20 MM TITAN NANOLOCK STRL ANTERALIGN LTX - [id number]  SCREW SPNL LCK 5X20 MM TITAN NANOLOCK STRL ANTERALIGN LTX  [employer] SOFAMOR DANEK-WD [id number] N/A 2 Implanted   SCREW SPNL LCK 5X30 MM TITAN NANOLOCK STRL ANTERALIGN LTX - [id number]  SCREW SPNL LCK 5X30 MM TITAN NANOLOCK STRL ANTERALIGN LTX  [employer] SOFAMOR DANEK-WD [id number] N/A 1 Implanted   SCREW SPNL LCK 5X20 MM TITAN NANOLOCK STRL ANTERALIGN LTX - [id number]  SCREW SPNL LCK 5X20 MM TITAN NANOLOCK STRL ANTERALIGN LTX  [employer] SOFOR DANEK- [id number] N/A 1 Implanted

## 2024-04-01 NOTE — CONSENT
Informed Consent for Blood Component Transfusion Note    I have discussed with the patient the rationale for blood component transfusion; its benefits in treating or preventing fatigue, organ damage, or death; and its risk which includes mild transfusion reactions, rare risk of blood borne infection, or more serious but rare reactions. I have discussed the alternatives to transfusion, including the risk and consequences of not receiving transfusion. The patient had an opportunity to ask questions and had agreed to proceed with transfusion of blood components.    Electronically signed by PRAKAHS Arvizu CNP on 4/1/24 at 4:46 AM EDT

## 2024-04-01 NOTE — PLAN OF CARE
Problem: Discharge Planning  Goal: Discharge to home or other facility with appropriate resources  4/1/2024 1659 by Leticia Miramontes RN  Outcome: Progressing  4/1/2024 0441 by Priya Wills RN  Outcome: Progressing     Problem: Safety - Adult  Goal: Free from fall injury  4/1/2024 1659 by Leticia Miramontes RN  Outcome: Progressing  4/1/2024 0441 by Priya Wills RN  Outcome: Progressing     Problem: Pain  Goal: Verbalizes/displays adequate comfort level or baseline comfort level  4/1/2024 1659 by Leticia Miramontes RN  Outcome: Progressing  4/1/2024 0441 by Priya Wills RN  Outcome: Progressing     Problem: ABCDS Injury Assessment  Goal: Absence of physical injury  4/1/2024 1659 by Leticia Miramontes RN  Outcome: Progressing  4/1/2024 0441 by Priya Wills RN  Outcome: Progressing

## 2024-04-01 NOTE — CARE COORDINATION
Patient and her family states that she has declined since the PT evaluation on 3/30 stating she is walking 100' and would like to go to [facility] prior to safely returning home.  Writer called the [facility] at [facility] to find admissions is closed.

## 2024-04-01 NOTE — PLAN OF CARE
After the rapid response patient had a CTA done to rule out PE.  CTA showed questionable right middle lobe segmental PE versus artifact.  Patient was already on Lovenox therapeutic dose along with aspirin and Plavix.  Now patient is anemic with dropping hemoglobin 6.2.  Patient is transfused 1 unit of PRBC.  Neurosurgery was made aware, need to hold antiplatelets and anticoagulation, will leave for neurosurgery to decide on holding medication.  Patient is currently not tachycardic, tachypneic and she has been saturating well on baseline oxygen.  At the time of my evaluation patient did mention some lower abdominal discomfort/pain.  Placing an order for occult stool test.  If it is positive then consider consulting GI.  [facility] will continue to follow.

## 2024-04-01 NOTE — PROGRESS NOTES
Endovascular Neurosurgery Progress Note      Reason for evaluation: R CCA stenosis    SUBJECTIVE:   NAEO    History of Chief Complaint:      79 y.o. female with right-sided Bell's palsy for 40 years, presbycusis, hyperlipidemia, hypertension, and chronic history of low back pain with remote lumbar surgery in the 1970s status post revision, status post DCM stimulator was admitted on 3/29/2024 for an anterior lumbar interbody fusion at L4 S1 as well as removal of her spinal cord stimulator.      On postop day 2.  At approximately 12 PM on 3/31/2024, patient's nurse noticed a new onset left facial droop.  Stroke alert was called at approximately 12:50 PM.  Upon arrival, patient's  and glucose 176.  On examination, patient had horizontal nystagmus as well as left upper extremity dysmetria with finger-nose-finger testing.  No obvious weakness was appreciated on examination.  No other focal deficits besides a chronic right facial weakness secondary to a remote right Bell's palsy.     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  MRI showed an acute right parietal stroke    Allergies  is allergic to ciprofloxacin.  Medications  Prior to Admission medications    Medication Sig Start Date End Date Taking? Authorizing Provider   oxyCODONE-acetaminophen (PERCOCET) 5-325 MG per tablet Take 1 tablet by mouth every 6 hours as needed for Pain for up to 7 days. Intended supply: 7 days. Take lowest dose possible to manage pain Max Daily Amount: 4 tablets 3/29/24 4/5/24 Yes Alberto Plasencia DO   loratadine (CLARITIN) 10 MG capsule Take 1 capsule by mouth daily    ProviderGeronimo MD   lisinopril-hydroCHLOROthiazide (PRINZIDE;ZESTORETIC) 20-25 MG per tablet Take 1 tablet by mouth daily    Geronimo Brower MD   HYDROcodone-acetaminophen (NORCO) 5-325 MG per tablet Take 1 tablet by mouth nightly. Max Daily Amount: 1 tablet    Geronimo Brower MD  Best Gaze: 0 - normal   3. Visual: 0 - no visual loss   4. Facial Palsy: 2 - partial paralysis (total or near total paralysis of the lower face)   5a. Motor left arm: 0 - no drift, limb holds 90 (or 45) degrees for full 10 seconds   5b.  Motor right arm: 0 - no drift, limb holds 90 (or 45) degrees for full 10 seconds   6a. Motor left le - no drift; leg holds 30 degree position for full 5 seconds   6b  Motor right le - no drift; leg holds 30 degree position for full 5 seconds   7. Limb Ataxia: 0 - absent   8.  Sensory: 0 - normal; no sensory loss   9. Best Language:  0 - no aphasia, normal   10. Dysarthria: 0 - normal   11. Extinction and Inattention: 0 - no abnormality         Total:   2 (baseline from Bell palsy of right face for 40 years)     MRS: 0      LABS:   Reviewed.  Lab Results   Component Value Date    HGB 6.1 (LL) 2024    WBC 8.7 2024     (L) 2024     (L) 2024    BUN 17 2024    CREATININE 0.8 2024    AST 52 (H) 2024    ALT 12 2024    APTT 29.2 08/15/2021    INR 1.0 2024      Lab Results   Component Value Date/Time    COVID19 Not Detected 2021 04:52 PM       RADIOLOGY:   Images were personally reviewed including:    CTA head neck 3/31/2024: R CCA occlusion      MRI brain 3/31/2024: acute right parietal stroke      ASSESSMENT:     79 year old woman with Bell palsy with right face droop for 40 years, HLD, HTN, came in here for L4 S1 anterior lumbar fusion and removal of spinal cord stimulator on the 3/29/2024.     On the 3/31/2024, patient was noted to have left face droop. 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 MRI showed an acute right parietal stroke.    PLAN:     - loaded with aspirin 325mg  and Plavix 300  - C/w Aspirin  81mg daily and Plavix  75mg daily  - Gentle hydration, keep -180, hold all BP meds  - no endovascular intervention at this time        Patient to Dr Oneill in the clinic in 2-3 weeks,

## 2024-04-02 ENCOUNTER — APPOINTMENT (OUTPATIENT)
Dept: GENERAL RADIOLOGY | Age: 79
DRG: 459 | End: 2024-04-02
Attending: STUDENT IN AN ORGANIZED HEALTH CARE EDUCATION/TRAINING PROGRAM
Payer: MEDICARE

## 2024-04-02 ENCOUNTER — APPOINTMENT (OUTPATIENT)
Dept: VASCULAR LAB | Age: 79
DRG: 459 | End: 2024-04-02
Attending: STUDENT IN AN ORGANIZED HEALTH CARE EDUCATION/TRAINING PROGRAM
Payer: MEDICARE

## 2024-04-02 LAB
ABO/RH: NORMAL
ALBUMIN SERPL-MCNC: 2.9 G/DL (ref 3.5–5.2)
ALBUMIN/GLOB SERPL: 1 {RATIO} (ref 1–2.5)
ALP SERPL-CCNC: 171 U/L (ref 35–104)
ALT SERPL-CCNC: 14 U/L (ref 10–35)
ANION GAP SERPL CALCULATED.3IONS-SCNC: 10 MMOL/L (ref 9–16)
ANTI-XA UNFRAC HEPARIN: 0.14 IU/L
ANTI-XA UNFRAC HEPARIN: 0.27 IU/L
ANTI-XA UNFRAC HEPARIN: <0.1 IU/L
ANTIBODY SCREEN: NEGATIVE
ARM BAND NUMBER: NORMAL
AST SERPL-CCNC: 57 U/L (ref 10–35)
BASOPHILS # BLD: 0 K/UL (ref 0–0.2)
BASOPHILS NFR BLD: 0 % (ref 0–2)
BILIRUB SERPL-MCNC: 0.8 MG/DL (ref 0–1.2)
BLOOD BANK BLOOD PRODUCT EXPIRATION DATE: NORMAL
BLOOD BANK BLOOD PRODUCT EXPIRATION DATE: NORMAL
BLOOD BANK DISPENSE STATUS: NORMAL
BLOOD BANK DISPENSE STATUS: NORMAL
BLOOD BANK ISBT PRODUCT BLOOD TYPE: 9500
BLOOD BANK ISBT PRODUCT BLOOD TYPE: 9500
BLOOD BANK PRODUCT CODE: NORMAL
BLOOD BANK PRODUCT CODE: NORMAL
BLOOD BANK SAMPLE EXPIRATION: NORMAL
BLOOD BANK UNIT TYPE AND RH: NORMAL
BLOOD BANK UNIT TYPE AND RH: NORMAL
BPU ID: NORMAL
BPU ID: NORMAL
BUN SERPL-MCNC: 9 MG/DL (ref 8–23)
CALCIUM SERPL-MCNC: 7.3 MG/DL (ref 8.6–10.4)
CHLORIDE SERPL-SCNC: 108 MMOL/L (ref 98–107)
CO2 SERPL-SCNC: 16 MMOL/L (ref 20–31)
COMPONENT: NORMAL
COMPONENT: NORMAL
CREAT SERPL-MCNC: 0.7 MG/DL (ref 0.5–0.9)
CROSSMATCH RESULT: NORMAL
CROSSMATCH RESULT: NORMAL
ECHO BSA: 1.66 M2
EOSINOPHIL # BLD: 0 K/UL (ref 0–0.4)
EOSINOPHILS RELATIVE PERCENT: 0 % (ref 1–4)
ERYTHROCYTE [DISTWIDTH] IN BLOOD BY AUTOMATED COUNT: 16.1 % (ref 11.8–14.4)
GFR SERPL CREATININE-BSD FRML MDRD: 88 ML/MIN/1.73M2
GLUCOSE SERPL-MCNC: 95 MG/DL (ref 74–99)
HCT VFR BLD AUTO: 25.7 % (ref 36.3–47.1)
HCT VFR BLD AUTO: 26.6 % (ref 36.3–47.1)
HCT VFR BLD AUTO: 27.8 % (ref 36.3–47.1)
HGB BLD-MCNC: 8.1 G/DL (ref 11.9–15.1)
HGB BLD-MCNC: 8.2 G/DL (ref 11.9–15.1)
HGB BLD-MCNC: 8.7 G/DL (ref 11.9–15.1)
IMM GRANULOCYTES # BLD AUTO: 0.1 K/UL (ref 0–0.3)
IMM GRANULOCYTES NFR BLD: 1 %
INR PPP: 0.9
LYMPHOCYTES NFR BLD: 0.78 K/UL (ref 1–4.8)
LYMPHOCYTES RELATIVE PERCENT: 8 % (ref 24–44)
MCH RBC QN AUTO: 30.7 PG (ref 25.2–33.5)
MCHC RBC AUTO-ENTMCNC: 31.3 G/DL (ref 28.4–34.8)
MCV RBC AUTO: 98.2 FL (ref 82.6–102.9)
MONOCYTES NFR BLD: 1.36 K/UL (ref 0.1–0.8)
MONOCYTES NFR BLD: 14 % (ref 1–7)
MORPHOLOGY: ABNORMAL
NEUTROPHILS NFR BLD: 77 % (ref 36–66)
NEUTS SEG NFR BLD: 7.46 K/UL (ref 1.8–7.7)
NRBC BLD-RTO: 0 PER 100 WBC
PARTIAL THROMBOPLASTIN TIME: 22 SEC (ref 23–36.5)
PLATELET # BLD AUTO: 121 K/UL (ref 138–453)
PMV BLD AUTO: 9.3 FL (ref 8.1–13.5)
POTASSIUM SERPL-SCNC: 3.7 MMOL/L (ref 3.7–5.3)
PROT SERPL-MCNC: 5.1 G/DL (ref 6.6–8.7)
PROTHROMBIN TIME: 12.3 SEC (ref 11.7–14.9)
RBC # BLD AUTO: 2.83 M/UL (ref 3.95–5.11)
SODIUM SERPL-SCNC: 134 MMOL/L (ref 136–145)
TRANSFUSION STATUS: NORMAL
TRANSFUSION STATUS: NORMAL
UNIT DIVISION: 0
UNIT DIVISION: 0
UNIT ISSUE DATE/TIME: NORMAL
UNIT ISSUE DATE/TIME: NORMAL
WBC OTHER # BLD: 9.7 K/UL (ref 3.5–11.3)

## 2024-04-02 PROCEDURE — 85014 HEMATOCRIT: CPT

## 2024-04-02 PROCEDURE — 2000000000 HC ICU R&B

## 2024-04-02 PROCEDURE — 71045 X-RAY EXAM CHEST 1 VIEW: CPT

## 2024-04-02 PROCEDURE — 85018 HEMOGLOBIN: CPT

## 2024-04-02 PROCEDURE — 6370000000 HC RX 637 (ALT 250 FOR IP): Performed by: NURSE PRACTITIONER

## 2024-04-02 PROCEDURE — 6370000000 HC RX 637 (ALT 250 FOR IP): Performed by: STUDENT IN AN ORGANIZED HEALTH CARE EDUCATION/TRAINING PROGRAM

## 2024-04-02 PROCEDURE — 93970 EXTREMITY STUDY: CPT | Performed by: SURGERY

## 2024-04-02 PROCEDURE — 36415 COLL VENOUS BLD VENIPUNCTURE: CPT

## 2024-04-02 PROCEDURE — 97129 THER IVNTJ 1ST 15 MIN: CPT

## 2024-04-02 PROCEDURE — 6370000000 HC RX 637 (ALT 250 FOR IP)

## 2024-04-02 PROCEDURE — 97116 GAIT TRAINING THERAPY: CPT

## 2024-04-02 PROCEDURE — 2580000003 HC RX 258: Performed by: PSYCHIATRY & NEUROLOGY

## 2024-04-02 PROCEDURE — 85025 COMPLETE CBC W/AUTO DIFF WBC: CPT

## 2024-04-02 PROCEDURE — 6360000002 HC RX W HCPCS

## 2024-04-02 PROCEDURE — 85730 THROMBOPLASTIN TIME PARTIAL: CPT

## 2024-04-02 PROCEDURE — 99231 SBSQ HOSP IP/OBS SF/LOW 25: CPT | Performed by: PSYCHIATRY & NEUROLOGY

## 2024-04-02 PROCEDURE — 85520 HEPARIN ASSAY: CPT

## 2024-04-02 PROCEDURE — 85610 PROTHROMBIN TIME: CPT

## 2024-04-02 PROCEDURE — 97110 THERAPEUTIC EXERCISES: CPT

## 2024-04-02 PROCEDURE — 93970 EXTREMITY STUDY: CPT

## 2024-04-02 PROCEDURE — 6370000000 HC RX 637 (ALT 250 FOR IP): Performed by: PSYCHIATRY & NEUROLOGY

## 2024-04-02 RX ORDER — HEPARIN SODIUM 10000 [USP'U]/100ML
5-30 INJECTION, SOLUTION INTRAVENOUS CONTINUOUS
Status: DISCONTINUED | OUTPATIENT
Start: 2024-04-02 | End: 2024-04-04

## 2024-04-02 RX ORDER — IPRATROPIUM BROMIDE AND ALBUTEROL SULFATE 2.5; .5 MG/3ML; MG/3ML
1 SOLUTION RESPIRATORY (INHALATION) EVERY 4 HOURS PRN
Status: DISCONTINUED | OUTPATIENT
Start: 2024-04-02 | End: 2024-04-05

## 2024-04-02 RX ORDER — LANOLIN ALCOHOL/MO/W.PET/CERES
3 CREAM (GRAM) TOPICAL NIGHTLY PRN
Status: DISCONTINUED | OUTPATIENT
Start: 2024-04-02 | End: 2024-04-09 | Stop reason: HOSPADM

## 2024-04-02 RX ADMIN — MIDODRINE HYDROCHLORIDE 5 MG: 5 TABLET ORAL at 11:01

## 2024-04-02 RX ADMIN — ACETAMINOPHEN 325MG 650 MG: 325 TABLET ORAL at 19:57

## 2024-04-02 RX ADMIN — Medication 3 MG: at 22:44

## 2024-04-02 RX ADMIN — MIDODRINE HYDROCHLORIDE 5 MG: 5 TABLET ORAL at 16:19

## 2024-04-02 RX ADMIN — TIZANIDINE 2 MG: 2 TABLET ORAL at 15:01

## 2024-04-02 RX ADMIN — SENNOSIDES 8.8 MG: 8.8 LIQUID ORAL at 19:58

## 2024-04-02 RX ADMIN — PANTOPRAZOLE SODIUM 40 MG: 40 TABLET, DELAYED RELEASE ORAL at 05:43

## 2024-04-02 RX ADMIN — ANTACID TABLETS 500 MG: 500 TABLET, CHEWABLE ORAL at 08:35

## 2024-04-02 RX ADMIN — HEPARIN SODIUM 12 UNITS/KG/HR: 10000 INJECTION, SOLUTION INTRAVENOUS at 10:16

## 2024-04-02 RX ADMIN — ACETAMINOPHEN 325MG 650 MG: 325 TABLET ORAL at 08:33

## 2024-04-02 RX ADMIN — ACETAMINOPHEN 325MG 650 MG: 325 TABLET ORAL at 15:01

## 2024-04-02 RX ADMIN — ACETAMINOPHEN 325MG 650 MG: 325 TABLET ORAL at 02:10

## 2024-04-02 RX ADMIN — HEPARIN SODIUM 14 UNITS/KG/HR: 10000 INJECTION, SOLUTION INTRAVENOUS at 16:20

## 2024-04-02 RX ADMIN — SODIUM CHLORIDE, PRESERVATIVE FREE 10 ML: 5 INJECTION INTRAVENOUS at 19:58

## 2024-04-02 RX ADMIN — ROSUVASTATIN CALCIUM 20 MG: 20 TABLET, FILM COATED ORAL at 19:58

## 2024-04-02 RX ADMIN — HYDROMORPHONE HYDROCHLORIDE 0.5 MG: 1 INJECTION, SOLUTION INTRAMUSCULAR; INTRAVENOUS; SUBCUTANEOUS at 05:43

## 2024-04-02 RX ADMIN — SODIUM CHLORIDE: 9 INJECTION, SOLUTION INTRAVENOUS at 05:32

## 2024-04-02 RX ADMIN — OXYCODONE 5 MG: 5 TABLET ORAL at 11:02

## 2024-04-02 RX ADMIN — SENNOSIDES 8.8 MG: 8.8 LIQUID ORAL at 08:33

## 2024-04-02 RX ADMIN — MIDODRINE HYDROCHLORIDE 5 MG: 5 TABLET ORAL at 08:33

## 2024-04-02 RX ADMIN — OXYCODONE 10 MG: 5 TABLET ORAL at 02:10

## 2024-04-02 ASSESSMENT — PAIN DESCRIPTION - FREQUENCY
FREQUENCY: CONTINUOUS
FREQUENCY: CONTINUOUS

## 2024-04-02 ASSESSMENT — PAIN SCALES - GENERAL
PAINLEVEL_OUTOF10: 7
PAINLEVEL_OUTOF10: 2
PAINLEVEL_OUTOF10: 1
PAINLEVEL_OUTOF10: 6
PAINLEVEL_OUTOF10: 7
PAINLEVEL_OUTOF10: 7
PAINLEVEL_OUTOF10: 3

## 2024-04-02 ASSESSMENT — PAIN DESCRIPTION - LOCATION
LOCATION: BACK;ABDOMEN
LOCATION: ABDOMEN;BACK
LOCATION: BACK

## 2024-04-02 ASSESSMENT — PAIN - FUNCTIONAL ASSESSMENT: PAIN_FUNCTIONAL_ASSESSMENT: PREVENTS OR INTERFERES SOME ACTIVE ACTIVITIES AND ADLS

## 2024-04-02 ASSESSMENT — PAIN DESCRIPTION - ORIENTATION
ORIENTATION: MID;ANTERIOR
ORIENTATION: ANTERIOR;MID

## 2024-04-02 ASSESSMENT — PAIN DESCRIPTION - ONSET
ONSET: ON-GOING
ONSET: ON-GOING

## 2024-04-02 ASSESSMENT — PAIN DESCRIPTION - DESCRIPTORS
DESCRIPTORS: ACHING
DESCRIPTORS: ACHING;TENDER

## 2024-04-02 ASSESSMENT — PAIN DESCRIPTION - PAIN TYPE: TYPE: ACUTE PAIN

## 2024-04-02 NOTE — PROGRESS NOTES
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    Status post lumbar spinal fusion Z98.1    Status post lumbar spine operation Z98.890    Cerebral infarction, watershed distribution, unilateral, acute ([facility]) I63.89    Post-op pain G89.18    Other pulmonary embolism without acute cor pulmonale ([facility]) I26.99       Pain: 0/10    Cognitive Treatment    Treatment time: 0493-6119      Subjective: [x] Alert [x] Cooperative     [] Confused     [] Agitated    [] Lethargic      Objective/Assessment:    Recall: Short-term recall of 3-units (related): 0/3 increased to 3/3 with min-mod verbal/phonemic cues, 3/3, 3/3. ST provided verbal education re: memory compensatory strategies. Strategies implemented with min verbal cues.     Problem Solving/Reasoning: Category members (concrete): 16/24 increased to 24/24 with min-mod verbal cues.   Word deductions: 1/5 increased to 5/5 with min-mod verbal/phonemic cues.   Problem solving (missing equipment): 5/6 increased to 6/6 with min verbal cues.     Other: Pt. Provided with education regarding memory compensatory strategies. Pt. Encouraged to utilize strategies once discharged from the hospital. Pt. Verbalized understanding.  Tip sheet left at bedside.      Plan:  [x] Continue ST services    [] Discharge from ST:      Discharge recommendations: []  Further therapy recommended at discharge.The patient should be able to tolerate at least 3 hours of therapy per

## 2024-04-02 NOTE — PROGRESS NOTES
Daily Progress Note  Neuro Critical Care    Patient Name: Annie Beltran  Patient : 1945  Room/Bed: 0130/0130-01  Code Status: Full  Allergies:   Allergies   Allergen Reactions    Ciprofloxacin Swelling       CHIEF COMPLAINT:      Left leg weakness, abdominal pain      INTERVAL HISTORY    Initial Presentation (Admitted 3/29/2024):  The patient is a 79 y.o. female who presents with right-sided Bell's palsy, presbycusis, hyperlipidemia, hypertension, and chronic history of low back pain with remote lumbar surgery in the 1970s status post revision, status post DCM stimulator was admitted on 3/29/2024 for an anterior lumbar interbody fusion at L4 S1 as well as removal of her spinal cord stimulator.  Patient is currently postop day 2.  At approximately 12 PM on 3/31/2024, patient's nurse noticed a new onset left facial droop.      Stroke alert was called at approximately 12:50 PM.  Upon arrival, patient's  and glucose 176.  On examination, patient had horizontal nystagmus as well as left upper extremity dysmetria with finger-nose-finger testing.  No obvious weakness was appreciated on examination.  No other focal deficits besides a chronic right facial weakness secondary to a remote right Bell's palsy.  Patient underwent stat CT head without any acute intracranial abnormalities.  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  Patient was then taken for stat MRI to confirm stroke etiology.  Given recent removal of DCM stimulator, patient also underwent a stat abdominal x-ray prior to acquisition of MR imaging.      Patient's MRI showed a watershed infarct along the right hemisphere most notably the right parietal occipital junction as well as the right centrum semiovale.  Patient was loaded with aspirin and Plavix, loaded with 1 L normal saline, and started on maintenance fluids at 125 cc/h.  Patient admitted to the neuro ICU for  Date    WBC 9.4 04/01/2024    HGB 8.2 (L) 04/01/2024    HCT 26.6 (L) 04/01/2024     (L) 04/01/2024    CHOL 107 04/01/2024    TRIG 115 04/01/2024    HDL 31 (L) 04/01/2024    ALT 14 04/01/2024    AST 57 (H) 04/01/2024     (L) 04/01/2024    K 3.7 04/01/2024     (H) 04/01/2024    CREATININE 0.7 04/01/2024    BUN 9 04/01/2024    CO2 16 (L) 04/01/2024    TSH 3.12 03/31/2024    INR 1.0 03/31/2024    LABA1C 5.5 03/30/2024     24 HOUR INTAKE/OUTPUT:  Intake/Output Summary (Last 24 hours) at 4/2/2024 0706  Last data filed at 4/2/2024 0701  Gross per 24 hour   Intake 3770.66 ml   Output 2850 ml   Net 920.66 ml         IMAGING:   CT ABDOMEN PELVIS W IV CONTRAST Additional Contrast? None   Final Result   1. Choledocholithiasis with a 9 mm gallstone in the common bile duct   inferiorly.  There is associated biliary dilatation with the common bile duct   measuring up to 1 cm and there is distension of the gallbladder which is   likely related.  A gallstone is also noted in the gallbladder.   2. 4.6 cm subcutaneous fluid collection on the right side of the back in the   right flank region with overlying skin staples.  This does not have   peripheral rim enhancement and is favored to represent a postoperative seroma.   3. No acute findings elsewhere in the abdomen or pelvis.   4. Anasarca with subcutaneous edema in the flanks and pelvis         CT CHEST PULMONARY EMBOLISM W CONTRAST   Final Result   Redemonstration of a pulmonary embolism within a branch supplying the right   middle lobe.         US GALLBLADDER RUQ   Final Result   1. Distended gallbladder with moderate amount of sludge and prominent stone   in the neck of the gallbladder. No sonographic Morgan sign or pericholecystic   fluid collection.   2. No biliary ductal dilatation.   3. Visualized portions of the pancreas appear to be echogenic without any   focal abnormality of rest of pancreas is obscured by shadowing from bowel gas.   4. Normal liver.

## 2024-04-02 NOTE — CARE COORDINATION
Met with to assess for support and complete PHQ-9 screen. Pt stated she lives alone in [place]. Stated she has a dtr that lives in [place] and a son in [place]. Stated her other dtr has passed away. Pt stated she has 8 grchildren. Pt stated her family is supportive and help out as needed. Pt does reports some depression \"off and on\" the past few weeks. She denies any SI. Pt stated she is not on any meds. Stated she has had counseling in the past. She declined any counseling resources. Pt encouraged to keep her PCP updated on how she is feeling.  Patient Health Questionnaire-9 (PHQ-9)    Over the last 2 weeks, how often have you been bothered by any of the following problems?    1. Little Interest or pleasure in doing things?   [x] Not at all  [] Several Days  [] More than half the day  []  Nearly every day    2. Feeling down, depressed or hopeless?    [] Not at all  [x] Several Days  [] More than half the day  []  Nearly every day    3. Trouble falling or staying asleep, or sleeping too much?   [x] Not at all  [] Several Days  [] More than half the day  []  Nearly every day    4. Feeling tired or having little energy?   [x] Not at all  [] Several Days  [] More than half the day  []  Nearly every day    5. Poor apettite or overeating?   [x] Not at all  [] Several Days  [] More than half the day  []  Nearly every day    6. Feeling bad about yourself-or that you are a failure or have let yourself or your family down?   [x] Not at all  [] Several Days  [] More than half the day  []  Nearly every day    7. Trouble concentrating on things, such as reading the newspaper or watching television?   [x] Not at all  [] Several Days  [] More than half the day  []  Nearly every day    8. Moving or speaking so slowly that other people could have noticed? Or the opposite-being so fidgety or restless that you have been moving around a lot more than usual?   [x] Not at all  [] Several Days  [] More than half the day  []  Nearly every

## 2024-04-02 NOTE — PROGRESS NOTES
Physical Therapy  Facility/Department: [address] NEURO ICU  Physical Therapy Daily Treatment Note    Name: Annie Beltran  : 1945  MRN: [id number]  Date of Service: 2024    Discharge Recommendations:  Patient would benefit from continued therapy after discharge   PT Equipment Recommendations  Equipment Needed: No      Patient Diagnosis(es): The primary encounter diagnosis was Post-op pain. Diagnoses of Stroke-like symptom, Cerebral infarction, watershed distribution, unilateral, acute (HCC), and Other acute pulmonary embolism without acute cor pulmonale (HCC) were also pertinent to this visit.  Past Medical History:  has a past medical history of Arthritis, Bell's palsy, Hearing loss, Hyperlipidemia, Hypertension, Lumbar disc disease, Seasonal allergies, Under care of service provider, and Wears glasses.  Past Surgical History:  has a past surgical history that includes  section; Colonoscopy (); Appendectomy; Pain management procedure (Right, 2016); Pain management procedure (Right, 2016); Pain management procedure (2017); Nerve Block Lumb Facet Level 1 Bilateral (Right, 2017); Humerus Closed Reduction (Right, 2021); back surgery; Pain management procedure (2020); Shoulder Arthroplasty (Right, 10/18/2021); Spinal Cord Stimulator Surgery (); and Spinal cord stimulator removal (2024).    Assessment   Assessment: Pt ambulated 100 ft with a RW x CGA. Noted SOB during gait training, yet SpO2 remained >90%. Pt would benefit from a continuation of PT for gait , strengthening and functional mobility following D/C.  Therapy Prognosis: Good  Decision Making: Medium Complexity  Requires PT Follow-Up: Yes  Activity Tolerance  Activity Tolerance: Patient limited by fatigue;Patient limited by endurance     Plan   Physical Therapy Plan  General Plan: 6-7 times per week  Current Treatment Recommendations: Strengthening, Functional mobility training, Transfer training,  SOB, SpO2 >90%.  More Ambulation?: No  Stairs/Curb  Stairs?: No     Balance  Posture: Good  Sitting - Static: Good  Sitting - Dynamic: Fair  Standing - Static: Fair  Standing - Dynamic: Fair  Comments: Assessed with RW.  A/AROM Exercises: Ankle pumps x20 BLE, LAQs x20 BLE, KTC x10 BLE, SLR x7 BLE, Elbow flexion x20 BUE.        AM-PAC - Mobility  AM-PAC Basic Mobility - Inpatient   How much help is needed turning from your back to your side while in a flat bed without using bedrails?: None  How much help is needed moving from lying on your back to sitting on the side of a flat bed without using bedrails?: None  How much help is needed moving to and from a bed to a chair?: A Little  How much help is needed standing up from a chair using your arms?: A Little  How much help is needed walking in hospital room?: A Little  How much help is needed climbing 3-5 steps with a railing?: A Little  AM-[facility] Inpatient Mobility Raw Score : 20  AM-PAC Inpatient T-Scale Score : 47.67  Mobility Inpatient CMS 0-100% Score: 35.83  Mobility Inpatient CMS G-Code Modifier : CJ    Goals  Short Term Goals  Time Frame for Short Term Goals: 10 visits  Short Term Goal 1: transfers with SBA  Short Term Goal 2: amb 150 ft with a RW x SBA  Short Term Goal 3: ascend/descend 4 steps with SBA  Short Term Goal 4: 20 min strengthening exercises x SBA       Education  Patient Education  Education Given To: Patient  Education Provided: Role of Therapy;Plan of Care  Education Method: Demonstration;Verbal  Barriers to Learning: None  Education Outcome: Verbalized understanding      Therapy Time   Individual Concurrent Group Co-treatment   Time In 1034         Time Out 1057         Minutes 23         Timed Code Treatment Minutes: 23 Minutes       Aaron Crandall PTA

## 2024-04-02 NOTE — PROGRESS NOTES
Endovascular Neurosurgery Progress Note      Reason for evaluation: R CCA stenosis    SUBJECTIVE:   NAEO.  Systolic (24hrs), Av , Min:105 , Max:180         History of Chief Complaint:      79 y.o. female with right-sided Bell's palsy for 40 years, presbycusis, hyperlipidemia, hypertension, and chronic history of low back pain with remote lumbar surgery in the 1970s status post revision, status post DCM stimulator was admitted on 3/29/2024 for an anterior lumbar interbody fusion at L4 S1 as well as removal of her spinal cord stimulator.      On postop day 2.  At approximately 12 PM on 3/31/2024, patient's nurse noticed a new onset left facial droop.  Stroke alert was called at approximately 12:50 PM.  Upon arrival, patient's  and glucose 176.  On examination, patient had horizontal nystagmus as well as left upper extremity dysmetria with finger-nose-finger testing.  No obvious weakness was appreciated on examination.  No other focal deficits besides a chronic right facial weakness secondary to a remote right Bell's palsy.     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  MRI showed an acute right parietal stroke    Allergies  is allergic to ciprofloxacin.  Medications  Prior to Admission medications    Medication Sig Start Date End Date Taking? Authorizing Provider   oxyCODONE-acetaminophen (PERCOCET) 5-325 MG per tablet Take 1 tablet by mouth every 6 hours as needed for Pain for up to 7 days. Intended supply: 7 days. Take lowest dose possible to manage pain Max Daily Amount: 4 tablets 3/29/24 4/5/24 Yes Alberto Plasencia DO   loratadine (CLARITIN) 10 MG capsule Take 1 capsule by mouth daily    ProviderGeronimo MD   lisinopril-hydroCHLOROthiazide (PRINZIDE;ZESTORETIC) 20-25 MG per tablet Take 1 tablet by mouth daily    ProviderGeronimo MD   HYDROcodone-acetaminophen (NORCO) 5-325 MG per tablet Take 1 tablet by mouth nightly. Max Daily

## 2024-04-02 NOTE — CARE COORDINATION
Transitional Planning:  Call to Jose Enrique at [facility]  Spoke with Marine.   Transferred to admissions.  Left message for call back.    Call to [facility].  Left message for Renae in admission for call back.    Informed daughter Brandy that we are awaiting call backs from the above facilities.

## 2024-04-03 PROBLEM — I10 ESSENTIAL HYPERTENSION: Status: ACTIVE | Noted: 2024-04-03

## 2024-04-03 PROBLEM — E78.5 DYSLIPIDEMIA: Status: ACTIVE | Noted: 2024-04-03

## 2024-04-03 LAB
ALBUMIN SERPL-MCNC: 2.9 G/DL (ref 3.5–5.2)
ALBUMIN/GLOB SERPL: 1 {RATIO} (ref 1–2.5)
ALP SERPL-CCNC: 158 U/L (ref 35–104)
ALT SERPL-CCNC: 12 U/L (ref 10–35)
ANION GAP SERPL CALCULATED.3IONS-SCNC: 8 MMOL/L (ref 9–16)
ANTI-XA UNFRAC HEPARIN: 0.38 IU/L
ANTI-XA UNFRAC HEPARIN: 0.4 IU/L
ANTI-XA UNFRAC HEPARIN: 0.41 IU/L
ANTI-XA UNFRAC HEPARIN: 0.49 IU/L
AST SERPL-CCNC: 44 U/L (ref 10–35)
BASOPHILS # BLD: 0 K/UL (ref 0–0.2)
BASOPHILS NFR BLD: 0 % (ref 0–2)
BILIRUB SERPL-MCNC: 0.4 MG/DL (ref 0–1.2)
BUN SERPL-MCNC: 8 MG/DL (ref 8–23)
CALCIUM SERPL-MCNC: 7.6 MG/DL (ref 8.6–10.4)
CHLORIDE SERPL-SCNC: 108 MMOL/L (ref 98–107)
CO2 SERPL-SCNC: 22 MMOL/L (ref 20–31)
CREAT SERPL-MCNC: 0.6 MG/DL (ref 0.5–0.9)
EOSINOPHIL # BLD: 0.16 K/UL (ref 0–0.44)
EOSINOPHILS RELATIVE PERCENT: 2 % (ref 1–4)
ERYTHROCYTE [DISTWIDTH] IN BLOOD BY AUTOMATED COUNT: 15.7 % (ref 11.8–14.4)
GFR SERPL CREATININE-BSD FRML MDRD: 89 ML/MIN/1.73M2
GLUCOSE SERPL-MCNC: 102 MG/DL (ref 74–99)
HCT VFR BLD AUTO: 25.3 % (ref 36.3–47.1)
HCT VFR BLD AUTO: 26 % (ref 36.3–47.1)
HGB BLD-MCNC: 8 G/DL (ref 11.9–15.1)
HGB BLD-MCNC: 8.2 G/DL (ref 11.9–15.1)
IMM GRANULOCYTES # BLD AUTO: 0.16 K/UL (ref 0–0.3)
IMM GRANULOCYTES NFR BLD: 2 %
LYMPHOCYTES NFR BLD: 1.04 K/UL (ref 1.1–3.7)
LYMPHOCYTES RELATIVE PERCENT: 13 % (ref 24–43)
MAGNESIUM SERPL-MCNC: 2.2 MG/DL (ref 1.6–2.4)
MCH RBC QN AUTO: 30.8 PG (ref 25.2–33.5)
MCHC RBC AUTO-ENTMCNC: 31.6 G/DL (ref 28.4–34.8)
MCV RBC AUTO: 97.3 FL (ref 82.6–102.9)
MONOCYTES NFR BLD: 1.52 K/UL (ref 0.1–1.2)
MONOCYTES NFR BLD: 19 % (ref 3–12)
MORPHOLOGY: ABNORMAL
NEUTROPHILS NFR BLD: 64 % (ref 36–65)
NEUTS SEG NFR BLD: 5.12 K/UL (ref 1.5–8.1)
NRBC BLD-RTO: 0 PER 100 WBC
PLATELET # BLD AUTO: 132 K/UL (ref 138–453)
PMV BLD AUTO: 9.7 FL (ref 8.1–13.5)
POTASSIUM SERPL-SCNC: 3.5 MMOL/L (ref 3.7–5.3)
PROT SERPL-MCNC: 5.1 G/DL (ref 6.6–8.7)
RBC # BLD AUTO: 2.6 M/UL (ref 3.95–5.11)
SODIUM SERPL-SCNC: 138 MMOL/L (ref 136–145)
WBC OTHER # BLD: 8 K/UL (ref 3.5–11.3)

## 2024-04-03 PROCEDURE — 85025 COMPLETE CBC W/AUTO DIFF WBC: CPT

## 2024-04-03 PROCEDURE — 6370000000 HC RX 637 (ALT 250 FOR IP): Performed by: STUDENT IN AN ORGANIZED HEALTH CARE EDUCATION/TRAINING PROGRAM

## 2024-04-03 PROCEDURE — 97130 THER IVNTJ EA ADDL 15 MIN: CPT

## 2024-04-03 PROCEDURE — 94640 AIRWAY INHALATION TREATMENT: CPT

## 2024-04-03 PROCEDURE — 36415 COLL VENOUS BLD VENIPUNCTURE: CPT

## 2024-04-03 PROCEDURE — 2700000000 HC OXYGEN THERAPY PER DAY

## 2024-04-03 PROCEDURE — 97129 THER IVNTJ 1ST 15 MIN: CPT

## 2024-04-03 PROCEDURE — 2580000003 HC RX 258

## 2024-04-03 PROCEDURE — 6370000000 HC RX 637 (ALT 250 FOR IP): Performed by: NURSE PRACTITIONER

## 2024-04-03 PROCEDURE — 6370000000 HC RX 637 (ALT 250 FOR IP)

## 2024-04-03 PROCEDURE — 83735 ASSAY OF MAGNESIUM: CPT

## 2024-04-03 PROCEDURE — 6360000002 HC RX W HCPCS

## 2024-04-03 PROCEDURE — 94761 N-INVAS EAR/PLS OXIMETRY MLT: CPT

## 2024-04-03 PROCEDURE — 99232 SBSQ HOSP IP/OBS MODERATE 35: CPT | Performed by: INTERNAL MEDICINE

## 2024-04-03 PROCEDURE — 6370000000 HC RX 637 (ALT 250 FOR IP): Performed by: INTERNAL MEDICINE

## 2024-04-03 PROCEDURE — 6370000000 HC RX 637 (ALT 250 FOR IP): Performed by: PSYCHIATRY & NEUROLOGY

## 2024-04-03 PROCEDURE — 2060000000 HC ICU INTERMEDIATE R&B

## 2024-04-03 PROCEDURE — 2580000003 HC RX 258: Performed by: PSYCHIATRY & NEUROLOGY

## 2024-04-03 PROCEDURE — 6360000002 HC RX W HCPCS: Performed by: REGISTERED NURSE

## 2024-04-03 PROCEDURE — 85520 HEPARIN ASSAY: CPT

## 2024-04-03 PROCEDURE — 97110 THERAPEUTIC EXERCISES: CPT

## 2024-04-03 PROCEDURE — 80053 COMPREHEN METABOLIC PANEL: CPT

## 2024-04-03 PROCEDURE — 97530 THERAPEUTIC ACTIVITIES: CPT

## 2024-04-03 PROCEDURE — 85014 HEMATOCRIT: CPT

## 2024-04-03 PROCEDURE — 97535 SELF CARE MNGMENT TRAINING: CPT

## 2024-04-03 PROCEDURE — 85018 HEMOGLOBIN: CPT

## 2024-04-03 RX ORDER — POTASSIUM CHLORIDE 20 MEQ/1
40 TABLET, EXTENDED RELEASE ORAL ONCE
Status: COMPLETED | OUTPATIENT
Start: 2024-04-03 | End: 2024-04-03

## 2024-04-03 RX ORDER — ROSUVASTATIN CALCIUM 10 MG/1
40 TABLET, COATED ORAL NIGHTLY
Status: DISCONTINUED | OUTPATIENT
Start: 2024-04-03 | End: 2024-04-09 | Stop reason: HOSPADM

## 2024-04-03 RX ORDER — IPRATROPIUM BROMIDE AND ALBUTEROL SULFATE 2.5; .5 MG/3ML; MG/3ML
1 SOLUTION RESPIRATORY (INHALATION)
Status: DISCONTINUED | OUTPATIENT
Start: 2024-04-03 | End: 2024-04-05

## 2024-04-03 RX ORDER — BISACODYL 10 MG
10 SUPPOSITORY, RECTAL RECTAL DAILY PRN
Status: DISCONTINUED | OUTPATIENT
Start: 2024-04-03 | End: 2024-04-09 | Stop reason: HOSPADM

## 2024-04-03 RX ADMIN — PANTOPRAZOLE SODIUM 40 MG: 40 TABLET, DELAYED RELEASE ORAL at 06:32

## 2024-04-03 RX ADMIN — HEPARIN SODIUM 16 UNITS/KG/HR: 10000 INJECTION, SOLUTION INTRAVENOUS at 13:21

## 2024-04-03 RX ADMIN — TIZANIDINE 2 MG: 2 TABLET ORAL at 07:50

## 2024-04-03 RX ADMIN — ROSUVASTATIN CALCIUM 40 MG: 20 TABLET, FILM COATED ORAL at 20:02

## 2024-04-03 RX ADMIN — ACETAMINOPHEN 325MG 650 MG: 325 TABLET ORAL at 03:13

## 2024-04-03 RX ADMIN — IPRATROPIUM BROMIDE AND ALBUTEROL SULFATE 1 DOSE: 2.5; .5 SOLUTION RESPIRATORY (INHALATION) at 15:29

## 2024-04-03 RX ADMIN — OXYCODONE 5 MG: 5 TABLET ORAL at 00:05

## 2024-04-03 RX ADMIN — ACETAMINOPHEN 325MG 650 MG: 325 TABLET ORAL at 07:47

## 2024-04-03 RX ADMIN — OXYCODONE 10 MG: 5 TABLET ORAL at 23:41

## 2024-04-03 RX ADMIN — SODIUM CHLORIDE, PRESERVATIVE FREE 10 ML: 5 INJECTION INTRAVENOUS at 20:03

## 2024-04-03 RX ADMIN — TIZANIDINE 2 MG: 2 TABLET ORAL at 21:52

## 2024-04-03 RX ADMIN — IPRATROPIUM BROMIDE AND ALBUTEROL SULFATE 1 DOSE: .5; 3 SOLUTION RESPIRATORY (INHALATION) at 07:16

## 2024-04-03 RX ADMIN — SENNOSIDES 8.8 MG: 8.8 LIQUID ORAL at 07:47

## 2024-04-03 RX ADMIN — TIZANIDINE 2 MG: 2 TABLET ORAL at 00:05

## 2024-04-03 RX ADMIN — POTASSIUM CHLORIDE 40 MEQ: 1500 TABLET, EXTENDED RELEASE ORAL at 05:32

## 2024-04-03 RX ADMIN — ACETAMINOPHEN 325MG 650 MG: 325 TABLET ORAL at 20:02

## 2024-04-03 RX ADMIN — OXYCODONE 10 MG: 5 TABLET ORAL at 07:47

## 2024-04-03 RX ADMIN — OXYCODONE 10 MG: 5 TABLET ORAL at 16:37

## 2024-04-03 RX ADMIN — SODIUM CHLORIDE: 9 INJECTION, SOLUTION INTRAVENOUS at 05:34

## 2024-04-03 RX ADMIN — Medication 10 MG: at 17:43

## 2024-04-03 RX ADMIN — Medication 3 MG: at 23:42

## 2024-04-03 RX ADMIN — SENNOSIDES 8.8 MG: 8.8 LIQUID ORAL at 20:02

## 2024-04-03 RX ADMIN — BISACODYL 10 MG: 10 SUPPOSITORY RECTAL at 12:06

## 2024-04-03 RX ADMIN — IPRATROPIUM BROMIDE AND ALBUTEROL SULFATE 1 DOSE: 2.5; .5 SOLUTION RESPIRATORY (INHALATION) at 20:17

## 2024-04-03 RX ADMIN — SODIUM CHLORIDE, PRESERVATIVE FREE 10 ML: 5 INJECTION INTRAVENOUS at 07:47

## 2024-04-03 RX ADMIN — ACETAMINOPHEN 325MG 650 MG: 325 TABLET ORAL at 15:05

## 2024-04-03 RX ADMIN — ASPIRIN 81 MG: 81 TABLET, COATED ORAL at 07:47

## 2024-04-03 RX ADMIN — OXYCODONE 10 MG: 5 TABLET ORAL at 19:56

## 2024-04-03 ASSESSMENT — PAIN DESCRIPTION - LOCATION
LOCATION: BACK
LOCATION: BACK
LOCATION: HEAD
LOCATION: LEG
LOCATION: BACK
LOCATION: LEG
LOCATION: ABDOMEN;BACK
LOCATION: BACK
LOCATION: BACK

## 2024-04-03 ASSESSMENT — PAIN DESCRIPTION - DESCRIPTORS
DESCRIPTORS: SORE
DESCRIPTORS: ACHING
DESCRIPTORS: DULL
DESCRIPTORS: DULL

## 2024-04-03 ASSESSMENT — PAIN SCALES - GENERAL
PAINLEVEL_OUTOF10: 7
PAINLEVEL_OUTOF10: 4
PAINLEVEL_OUTOF10: 6
PAINLEVEL_OUTOF10: 7
PAINLEVEL_OUTOF10: 7
PAINLEVEL_OUTOF10: 4
PAINLEVEL_OUTOF10: 7
PAINLEVEL_OUTOF10: 2
PAINLEVEL_OUTOF10: 2
PAINLEVEL_OUTOF10: 7

## 2024-04-03 ASSESSMENT — PAIN DESCRIPTION - ORIENTATION
ORIENTATION: LEFT
ORIENTATION: LOWER
ORIENTATION: LEFT
ORIENTATION: LOWER
ORIENTATION: LOWER

## 2024-04-03 NOTE — PROGRESS NOTES
Occupational Therapy  Facility/Department: [address] NEURO ICU  Occupational Therapy Daily Treatment Note    Name: Annie Beltran  : 1945  MRN: [id number]  Date of Service: 4/3/2024    Discharge Recommendations:  Patient would benefit from continued therapy after discharge  OT Equipment Recommendations  Equipment Needed: Yes  Mobility Devices: ADL Assistive Devices  Walker: Rolling  ADL Assistive Devices: Reacher;Long-handled Sponge       Patient Diagnosis(es): The primary encounter diagnosis was Post-op pain. Diagnoses of Stroke-like symptom, Cerebral infarction, watershed distribution, unilateral, acute (HCC), and Other acute pulmonary embolism without acute cor pulmonale (HCC) were also pertinent to this visit.  Past Medical History:  has a past medical history of Arthritis, Bell's palsy, Hearing loss, Hyperlipidemia, Hypertension, Lumbar disc disease, Seasonal allergies, Under care of service provider, and Wears glasses.  Past Surgical History:  has a past surgical history that includes  section; Colonoscopy (); Appendectomy; Pain management procedure (Right, 2016); Pain management procedure (Right, 2016); Pain management procedure (2017); Nerve Block Lumb Facet Level 1 Bilateral (Right, 2017); Humerus Closed Reduction (Right, 2021); back surgery; Pain management procedure (2020); Shoulder Arthroplasty (Right, 10/18/2021); Spinal Cord Stimulator Surgery (); Spinal cord stimulator removal (2024); lumbar fusion (N/A, 3/29/2024); Spine surgery (N/A, 3/29/2024); and lumbar fusion (N/A, 3/29/2024).    Treatment Diagnosis: S/P Lumbar Spinal Fusion L4-S1 3/29/2024      Assessment   Performance deficits / Impairments: Decreased functional mobility ;Decreased high-level IADLs;Decreased ADL status;Decreased endurance;Decreased balance;Decreased safe awareness  Assessment: Pt making progress towards goals this session and demo only weakened LLE hip flexion from CVA

## 2024-04-03 NOTE — PROGRESS NOTES
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    Status post lumbar spinal fusion Z98.1    Status post lumbar spine operation Z98.890    Cerebral infarction, watershed distribution, unilateral, acute ([facility]) I63.89    Post-op pain G89.18    Other pulmonary embolism without acute cor pulmonale ([facility]) I26.99    Dyslipidemia E78.5    Essential hypertension I10       Pain: 0/10    Cognitive Treatment    Treatment time: 7158-2762      Subjective: [x] Alert [x] Cooperative     [] Confused     [] Agitated    [] Lethargic      Objective/Assessment:    Recall: Short-term recall of 3-units (varied): 0/3 increased to 3/3, 3/3, 3/3. ST provided verbal education re: memory compensatory strategies to aid in recall. Strategies implemented with min verbal cues.     Immediate memory for 5-unit sequence: 6/10 increased to 10/10 with multiple repetitions or when given 4-units. ST provided verbal education re: chunking.  Word list retention (category inclusion): 17/21 increased to 21/21 with min-mod verbal cues and repetition.    Problem Solving/Reasoning: Category members (concrete): 9/10 increased to 10/10 with min verbal cues.   State the category (concrete): 4/6 increased to 6/6 with min-mod verbal/phonemic cues.  Add to the category (concrete): 6/6.  Problem solving (answering questions): 10/10.      Plan:  [x] Continue ST services    [] Discharge from ST:      Discharge recommendations: []  Further  Pain

## 2024-04-03 NOTE — PROGRESS NOTES
09/06/2016); Pain management procedure (Right, 12/13/2016); Pain management procedure (04/04/2017); Nerve Block Lumb Facet Level 1 Bilateral (Right, 04/04/2017); Humerus Closed Reduction (Right, 08/16/2021); back surgery; Pain management procedure (08/2020); Shoulder Arthroplasty (Right, 10/18/2021); Spinal Cord Stimulator Surgery (2020); Spinal cord stimulator removal (03/29/2024); lumbar fusion (N/A, 3/29/2024); Spine surgery (N/A, 3/29/2024); and lumbar fusion (N/A, 3/29/2024).  Social History   reports that she has never smoked. She has never used smokeless tobacco.   reports current alcohol use of about 3.0 standard drinks of alcohol per week.   reports no history of drug use.  Family History  family history includes Heart Disease in her mother and sister; Kidney Disease in her father.    Review of Systems:  CONSTITUTIONAL:  negative for fevers, chills, fatigue and malaise    EYES:  negative for double vision, blurred vision and photophobia     HEENT:  negative for tinnitus, epistaxis and sore throat    RESPIRATORY:  negative for cough, shortness of breath, wheezing    CARDIOVASCULAR:  negative for chest pain, palpitations, syncope, edema    GASTROINTESTINAL:  negative for nausea, vomiting    GENITOURINARY:  negative for incontinence    MUSCULOSKELETAL:  negative for neck or back pain    NEUROLOGICAL:  Negative for weakness and tingling  negative for headaches and dizziness    PSYCHIATRIC:  negative for anxiety      Review of systems otherwise negative.      OBJECTIVE:     Vitals:    04/03/24 1200   BP: (!) 162/70   Pulse: 82   Resp: 18   Temp: 97.8 °F (36.6 °C)   SpO2: 98%        General:  Gen: normal habitus, NAD  HEENT: NCAT, mucosa moist  Cvs: RRR, S1 S2 normal  Resp: symmetric unlabored breathing  Abd: s/nd/nt  Ext: no edema  Skin: no lesions seen, warm and dry    Neuro:  Gen: awake and alert, oriented x3.   Lang/speech: no aphasia or dysarthria. Follows commands.  CN: PERRL, EOMI, VFF, V1-3 intact, face  symmetric, hearing intact, shoulder shrug symmetric, tongue midline  Motor: grossly 5/5 UE and LE b/l  Sense: LT intact in all 4 ext.  Coord: FTN and HTS intact b/l  DTR: deferred  Gait: deferred    NIH Stroke Scale:   1a  Level of consciousness: 0 - alert; keenly responsive   1b. LOC questions:  0 - answers both questions correctly   1c. LOC commands: 0 - performs both tasks correctly   2.  Best Gaze: 0 - normal   3. Visual: 0 - no visual loss   4. Facial Palsy: 2 - partial paralysis (total or near total paralysis of the lower face)   5a. Motor left arm: 0 - no drift, limb holds 90 (or 45) degrees for full 10 seconds   5b.  Motor right arm: 0 - no drift, limb holds 90 (or 45) degrees for full 10 seconds   6a. Motor left le - no drift; leg holds 30 degree position for full 5 seconds   6b  Motor right le - no drift; leg holds 30 degree position for full 5 seconds   7. Limb Ataxia: 0 - absent   8.  Sensory: 0 - normal; no sensory loss   9. Best Language:  0 - no aphasia, normal   10. Dysarthria: 0 - normal   11. Extinction and Inattention: 0 - no abnormality         Total:   2 (baseline from Bell palsy of right face for 40 years)     MRS: 0      LABS:   Reviewed.  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     On the

## 2024-04-03 NOTE — PROGRESS NOTES
[facility]  Office: [phone]  Enoch Hou DO, Irineo Pereira, DO, Teja Pedroza DO, Aleksandr Goff, DO, Arsenio Gary MD, Crys Schroeder MD, Micheal Johnson MD, Ally Cordova MD,  Sathish Poon MD, Julia Smith MD, Fatimah Infante MD,  Venancio Silverman DO, Dony Kaur MD, Zach iLao MD, Zbigniew Hou DO, Lucrecia Lima MD,  Herminio Simon DO, Uzma Reyes MD, Mary Lane MD, Peace Benoit MD, Philip Perez MD,  Ismael Landa MD, Helen Ybarra MD, Mario Campuzano MD, Kaity Green MD, Ender Leslie MD, Prudencio Lewis MD, Abraham Levine DO, Saad Borjas DO, Celena Mancilla MD,  Facundo Vaughn MD, Shirley Waterhouse, CNP,  Janet Hills CNP, Bala Street, CNP,  Mariah Grissom, DNP, Delia Collins, CNP, Erinn Estrada, CNP, Renae White, CNP, Chayo Desai, CNP, Shannon Marks, PA-C, Janis Mercer, PA-C, Anya Lassiter, CNP, Alka Gregorio, CNP, Héctor Judd, CNP, Day Philip, CNP, Luda Melgar, CNP, Kizzy Mcleod, CNS, Tess Bullard, CNP, Amy Gomez CNP, Tracy Schwab, CNP         [facility]   IN-PATIENT SERVICE   [facility]    Progress Note    4/3/2024    1:25 PM    Name:   Annie Beltran  MRN:     [id number]     Acct:      [id number]   Room:   0130/0130-01  IP Day:  5  Admit Date:  3/29/2024  9:25 AM    PCP:   Shankar Brown MD  Code Status:  Full Code    Subjective:     C/C: Left-sided weakness  Interval History Status: .   Still has mild left-sided weakness, denies any double vision  Endovascular neurology has been following the patient, currently patient is only on aspirin and Plavix and Lovenox has been taken off  She is on heparin drip at this time  Patient complains of constipation since last 4-5 days  She still has Jo's catheter in place  Brief History:       Patient is a 79-year-old female s/p ALIF with PI L4-S1 and removal of spinal stimulator, DOS: 3/29/24 who was admitted under Ortho surgery   On 3/31/2024  does not have peripheral rim enhancement and is favored to represent a postoperative seroma. 3. No acute findings elsewhere in the abdomen or pelvis. 4. Anasarca with subcutaneous edema in the flanks and pelvis     CT CHEST PULMONARY EMBOLISM W CONTRAST    Result Date: 4/1/2024  Redemonstration of a pulmonary embolism within a branch supplying the right middle lobe.     CT LUMBAR SPINE WO CONTRAST    Result Date: 4/1/2024  1. No evidence of acute fracture or dislocation. 2. Multilevel degenerative disc disease and facet osteoarthritis as described above. There are findings of previous posterior spinal fusion from L2 to L4 level with bilateral posterolateral rods and transpedicular screws. 3. Mild central stenosis at L1-L2 level. 4. Neural foraminal stenosis at multiple levels as described above. 5. Moderate to severe levorotatory scoliosis in the mid and upper lumbar spine and lower thoracic spine. 6. No evidence of hemorrhage or abnormal fluid collection in the prevertebral or paraspinal soft tissues.     US GALLBLADDER RUQ    Result Date: 4/1/2024  1. Distended gallbladder with moderate amount of sludge and prominent stone in the neck of the gallbladder. No sonographic Morgan sign or pericholecystic fluid collection. 2. No biliary ductal dilatation. 3. Visualized portions of the pancreas appear to be echogenic without any focal abnormality of rest of pancreas is obscured by shadowing from bowel gas. 4. Normal liver. 5. No evidence of right hydronephrosis. 6. No evidence of right upper quadrant ascites.     CT CHEST PULMONARY EMBOLISM W CONTRAST    Result Date: 3/31/2024  Questionable right middle lobe segmental pulmonary embolism versus artifact. Coronary artery disease. Distended gallbladder and cholelithiasis.  Recommend gallbladder ultrasound.     MRI LIMITED BRAIN    Result Date: 3/31/2024  Scattered small acute to subacute infarctions in the right parietooccipital lobes and mildly in the posterior right

## 2024-04-03 NOTE — PLAN OF CARE
Problem: Discharge Planning  Goal: Discharge to home or other facility with appropriate resources  4/3/2024 0449 by Fabiola Salcedo RN  Outcome: Progressing     Problem: Safety - Adult  Goal: Free from fall injury  4/3/2024 0449 by Fabiola Salcedo RN  Outcome: Progressing     Problem: Pain  Goal: Verbalizes/displays adequate comfort level or baseline comfort level  4/3/2024 0449 by Fabiola Salcedo RN  Outcome: Progressing

## 2024-04-03 NOTE — PROGRESS NOTES
Blood cx pending final results.   benefit from continued acute PT to address deficits.  Therapy Prognosis: Good  Decision Making: Medium Complexity  Requires PT Follow-Up: Yes  Activity Tolerance  Activity Tolerance: Patient limited by fatigue;Patient limited by endurance     Plan   Physical Therapy Plan  General Plan: 6-7 times per week  Current Treatment Recommendations: Strengthening, Functional mobility training, Transfer training, Endurance training, Stair training, Gait training, Safety education & training, Therapeutic activities  Safety Devices  Type of Devices: Call light within reach, Gait belt, Nurse notified, Left in chair, All fall risk precautions in place, Patient at risk for falls  Restraints  Restraints Initially in Place: No     Restrictions  Restrictions/Precautions  Restrictions/Precautions: Weight Bearing, General Precautions  Required Braces or Orthoses?: Yes  Lower Extremity Weight Bearing Restrictions  Partial Weight Bearing Percentage Or Pounds: Full weight bearing as tolerated  Partial Weight Bearing Percentage Or Pounds: Full weight bearing as tolerated  Required Braces or Orthoses  Spinal Other: Pt should wear LSO while out of bed and ambulating  Position Activity Restriction  Other position/activity restrictions: Beginning POD #1 1)  Up to bedside chair at least three times a day as tolerated. 2)  Ambulate in room progressing to hallway with assistive device four times daily (including PT) when PT advises.   3)  Bathroom privileges with assistance.; 3/31 stroke alert     Subjective   General  Chart Reviewed: No  Patient assessed for rehabilitation services?: Yes  Response To Previous Treatment: Not applicable  Family / Caregiver Present: No  Follows Commands: Within Functional Limits  General Comment  Comments: RN and pt agreeable to PT. pt alert in bed upon arrival. 2L O2 NC.  Subjective  Subjective: Pt denies pain, endorses some numbness of L foot since sx that has been improving.         Cognition      Education  Patient Education  Education Given To: Patient  Education Provided: Role of Therapy;Plan of Care  Education Method: Demonstration;Verbal  Barriers to Learning: None  Education Outcome: Verbalized understanding;Demonstrated understanding      Therapy Time   Individual Concurrent Group Co-treatment   Time In 1105         Time Out 1138         Minutes 33         Timed Code Treatment Minutes: 32 Minutes       Lior Johnston, PT

## 2024-04-03 NOTE — CARE COORDINATION
Transitional Planning  Call from Griselda at [facility], accepting patient.  Discussed with patient, patient agreeable.      1036: Spoke with Griselda and she will start pre cert.

## 2024-04-03 NOTE — PLAN OF CARE
Problem: Discharge Planning  Goal: Discharge to home or other facility with appropriate resources  Outcome: Progressing     Problem: Safety - Adult  Goal: Free from fall injury  Outcome: Progressing     Problem: Pain  Goal: Verbalizes/displays adequate comfort level or baseline comfort level  Outcome: Progressing     Problem: ABCDS Injury Assessment  Goal: Absence of physical injury  Outcome: Progressing     Problem: Respiratory - Adult  Goal: Achieves optimal ventilation and oxygenation  4/3/2024 1854 by Edna Bacon RN  Outcome: Progressing  4/3/2024 0722 by Glory Dillard RCP  Flowsheets (Taken 4/3/2024 0722)  Achieves optimal ventilation and oxygenation:   Assess for changes in respiratory status   Position to facilitate oxygenation and minimize respiratory effort   Initiate smoking cessation protocol as indicated   Assess the need for suctioning and aspirate as needed   Respiratory therapy support as indicated   Assess and instruct to report shortness of breath or any respiratory difficulty   Encourage broncho-pulmonary hygiene including cough, deep breathe, incentive spirometry   Oxygen supplementation based on oxygen saturation or arterial blood gases   Assess for changes in mentation and behavior

## 2024-04-03 NOTE — PLAN OF CARE
Problem: Respiratory - Adult  Goal: Achieves optimal ventilation and oxygenation  Flowsheets (Taken 4/3/2024 4322)  Achieves optimal ventilation and oxygenation:   Assess for changes in respiratory status   Position to facilitate oxygenation and minimize respiratory effort   Initiate smoking cessation protocol as indicated   Assess the need for suctioning and aspirate as needed   Respiratory therapy support as indicated   Assess and instruct to report shortness of breath or any respiratory difficulty   Encourage broncho-pulmonary hygiene including cough, deep breathe, incentive spirometry   Oxygen supplementation based on oxygen saturation or arterial blood gases   Assess for changes in mentation and behavior

## 2024-04-04 ENCOUNTER — APPOINTMENT (OUTPATIENT)
Age: 79
DRG: 459 | End: 2024-04-04
Attending: STUDENT IN AN ORGANIZED HEALTH CARE EDUCATION/TRAINING PROGRAM
Payer: MEDICARE

## 2024-04-04 LAB
ALBUMIN SERPL-MCNC: 2.7 G/DL (ref 3.5–5.2)
ALBUMIN/GLOB SERPL: 1 {RATIO} (ref 1–2.5)
ALP SERPL-CCNC: 378 U/L (ref 35–104)
ALT SERPL-CCNC: 31 U/L (ref 10–35)
ANION GAP SERPL CALCULATED.3IONS-SCNC: 8 MMOL/L (ref 9–16)
ANTI-XA UNFRAC HEPARIN: 0.34 IU/L
AST SERPL-CCNC: 75 U/L (ref 10–35)
BASOPHILS # BLD: <0.03 K/UL (ref 0–0.2)
BASOPHILS NFR BLD: 0 % (ref 0–2)
BILIRUB SERPL-MCNC: 0.3 MG/DL (ref 0–1.2)
BUN SERPL-MCNC: 8 MG/DL (ref 8–23)
CALCIUM SERPL-MCNC: 7.4 MG/DL (ref 8.6–10.4)
CHLORIDE SERPL-SCNC: 109 MMOL/L (ref 98–107)
CO2 SERPL-SCNC: 22 MMOL/L (ref 20–31)
CREAT SERPL-MCNC: 0.6 MG/DL (ref 0.5–0.9)
ECHO AO ROOT DIAM: 2.9 CM
ECHO AO ROOT INDEX: 1.8 CM/M2
ECHO AV AREA PEAK VELOCITY: 2 CM2
ECHO AV AREA VTI: 2.1 CM2
ECHO AV AREA/BSA PEAK VELOCITY: 1.2 CM2/M2
ECHO AV AREA/BSA VTI: 1.3 CM2/M2
ECHO AV MEAN GRADIENT: 5 MMHG
ECHO AV MEAN VELOCITY: 1 M/S
ECHO AV PEAK GRADIENT: 11 MMHG
ECHO AV PEAK VELOCITY: 1.6 M/S
ECHO AV VELOCITY RATIO: 0.63
ECHO AV VTI: 35.2 CM
ECHO BSA: 1.66 M2
ECHO EST RA PRESSURE: 3 MMHG
ECHO LA AREA 2C: 19.3 CM2
ECHO LA AREA 4C: 25.5 CM2
ECHO LA DIAMETER INDEX: 2.42 CM/M2
ECHO LA DIAMETER: 3.9 CM
ECHO LA MAJOR AXIS: 6.2 CM
ECHO LA MINOR AXIS: 6 CM
ECHO LA TO AORTIC ROOT RATIO: 1.34
ECHO LA VOL BP: 63 ML (ref 22–52)
ECHO LA VOL MOD A2C: 51 ML (ref 22–52)
ECHO LA VOL MOD A4C: 77 ML (ref 22–52)
ECHO LA VOL/BSA BIPLANE: 39 ML/M2 (ref 16–34)
ECHO LA VOLUME INDEX MOD A2C: 32 ML/M2 (ref 16–34)
ECHO LA VOLUME INDEX MOD A4C: 48 ML/M2 (ref 16–34)
ECHO LV E' LATERAL VELOCITY: 10 CM/S
ECHO LV E' SEPTAL VELOCITY: 8 CM/S
ECHO LV EDV A2C: 58 ML
ECHO LV EDV A4C: 66 ML
ECHO LV EDV INDEX A4C: 41 ML/M2
ECHO LV EDV NDEX A2C: 36 ML/M2
ECHO LV EJECTION FRACTION A2C: 61 %
ECHO LV EJECTION FRACTION A4C: 60 %
ECHO LV EJECTION FRACTION BIPLANE: 61 % (ref 55–100)
ECHO LV ESV A2C: 23 ML
ECHO LV ESV A4C: 26 ML
ECHO LV ESV INDEX A2C: 14 ML/M2
ECHO LV ESV INDEX A4C: 16 ML/M2
ECHO LV FRACTIONAL SHORTENING: 22 % (ref 28–44)
ECHO LV INTERNAL DIMENSION DIASTOLE INDEX: 2.8 CM/M2
ECHO LV INTERNAL DIMENSION DIASTOLIC: 4.5 CM (ref 3.9–5.3)
ECHO LV INTERNAL DIMENSION SYSTOLIC INDEX: 2.17 CM/M2
ECHO LV INTERNAL DIMENSION SYSTOLIC: 3.5 CM
ECHO LV IVSD: 1 CM (ref 0.6–0.9)
ECHO LV MASS 2D: 153.3 G (ref 67–162)
ECHO LV MASS INDEX 2D: 95.2 G/M2 (ref 43–95)
ECHO LV POSTERIOR WALL DIASTOLIC: 1 CM (ref 0.6–0.9)
ECHO LV RELATIVE WALL THICKNESS RATIO: 0.44
ECHO LVOT AREA: 3.1 CM2
ECHO LVOT AV VTI INDEX: 0.65
ECHO LVOT DIAM: 2 CM
ECHO LVOT MEAN GRADIENT: 2 MMHG
ECHO LVOT PEAK GRADIENT: 4 MMHG
ECHO LVOT PEAK VELOCITY: 1 M/S
ECHO LVOT STROKE VOLUME INDEX: 44.9 ML/M2
ECHO LVOT SV: 72.2 ML
ECHO LVOT VTI: 23 CM
ECHO MV A VELOCITY: 1.14 M/S
ECHO MV AREA VTI: 2.3 CM2
ECHO MV E DECELERATION TIME (DT): 167 MS
ECHO MV E VELOCITY: 1.02 M/S
ECHO MV E/A RATIO: 0.89
ECHO MV E/E' LATERAL: 10.2
ECHO MV E/E' RATIO (AVERAGED): 11.48
ECHO MV LVOT VTI INDEX: 1.34
ECHO MV MAX VELOCITY: 1.4 M/S
ECHO MV MEAN GRADIENT: 3 MMHG
ECHO MV MEAN VELOCITY: 0.8 M/S
ECHO MV PEAK GRADIENT: 7 MMHG
ECHO MV VTI: 30.8 CM
ECHO PV MAX VELOCITY: 0.9 M/S
ECHO PV PEAK GRADIENT: 3 MMHG
ECHO RA AREA 4C: 10.5 CM2
ECHO RIGHT VENTRICULAR SYSTOLIC PRESSURE (RVSP): 54 MMHG
ECHO RV BASAL DIMENSION: 3.3 CM
ECHO RV FREE WALL PEAK S': 15 CM/S
ECHO RV TAPSE: 2.3 CM (ref 1.7–?)
ECHO TV REGURGITANT MAX VELOCITY: 3.58 M/S
ECHO TV REGURGITANT PEAK GRADIENT: 51 MMHG
EOSINOPHIL # BLD: 0.11 K/UL (ref 0–0.44)
EOSINOPHILS RELATIVE PERCENT: 2 % (ref 1–4)
ERYTHROCYTE [DISTWIDTH] IN BLOOD BY AUTOMATED COUNT: 15.3 % (ref 11.8–14.4)
GFR SERPL CREATININE-BSD FRML MDRD: >90 ML/MIN/1.73M2
GLUCOSE SERPL-MCNC: 104 MG/DL (ref 74–99)
HCT VFR BLD AUTO: 23.7 % (ref 36.3–47.1)
HCT VFR BLD AUTO: 28.6 % (ref 36.3–47.1)
HGB BLD-MCNC: 7.6 G/DL (ref 11.9–15.1)
HGB BLD-MCNC: 8.6 G/DL (ref 11.9–15.1)
IMM GRANULOCYTES # BLD AUTO: 0.09 K/UL (ref 0–0.3)
IMM GRANULOCYTES NFR BLD: 2 %
LYMPHOCYTES NFR BLD: 0.95 K/UL (ref 1.1–3.7)
LYMPHOCYTES RELATIVE PERCENT: 18 % (ref 24–43)
MCH RBC QN AUTO: 31.4 PG (ref 25.2–33.5)
MCHC RBC AUTO-ENTMCNC: 32.1 G/DL (ref 28.4–34.8)
MCV RBC AUTO: 97.9 FL (ref 82.6–102.9)
MONOCYTES NFR BLD: 1.26 K/UL (ref 0.1–1.2)
MONOCYTES NFR BLD: 24 % (ref 3–12)
NEUTROPHILS NFR BLD: 54 % (ref 36–65)
NEUTS SEG NFR BLD: 2.8 K/UL (ref 1.5–8.1)
NRBC BLD-RTO: 0 PER 100 WBC
PLATELET # BLD AUTO: 144 K/UL (ref 138–453)
PMV BLD AUTO: 10 FL (ref 8.1–13.5)
POTASSIUM SERPL-SCNC: 3.9 MMOL/L (ref 3.7–5.3)
PROT SERPL-MCNC: 5 G/DL (ref 6.6–8.7)
RBC # BLD AUTO: 2.42 M/UL (ref 3.95–5.11)
RBC # BLD: ABNORMAL 10*6/UL
SODIUM SERPL-SCNC: 139 MMOL/L (ref 136–145)
WBC OTHER # BLD: 5.2 K/UL (ref 3.5–11.3)

## 2024-04-04 PROCEDURE — 97130 THER IVNTJ EA ADDL 15 MIN: CPT

## 2024-04-04 PROCEDURE — 6370000000 HC RX 637 (ALT 250 FOR IP): Performed by: ORTHOPAEDIC SURGERY

## 2024-04-04 PROCEDURE — 85014 HEMATOCRIT: CPT

## 2024-04-04 PROCEDURE — 6370000000 HC RX 637 (ALT 250 FOR IP)

## 2024-04-04 PROCEDURE — 6370000000 HC RX 637 (ALT 250 FOR IP): Performed by: NURSE PRACTITIONER

## 2024-04-04 PROCEDURE — 6370000000 HC RX 637 (ALT 250 FOR IP): Performed by: INTERNAL MEDICINE

## 2024-04-04 PROCEDURE — 97116 GAIT TRAINING THERAPY: CPT

## 2024-04-04 PROCEDURE — 97129 THER IVNTJ 1ST 15 MIN: CPT

## 2024-04-04 PROCEDURE — 93306 TTE W/DOPPLER COMPLETE: CPT | Performed by: INTERNAL MEDICINE

## 2024-04-04 PROCEDURE — 36415 COLL VENOUS BLD VENIPUNCTURE: CPT

## 2024-04-04 PROCEDURE — 2060000000 HC ICU INTERMEDIATE R&B

## 2024-04-04 PROCEDURE — 6360000002 HC RX W HCPCS: Performed by: REGISTERED NURSE

## 2024-04-04 PROCEDURE — 94640 AIRWAY INHALATION TREATMENT: CPT

## 2024-04-04 PROCEDURE — 85520 HEPARIN ASSAY: CPT

## 2024-04-04 PROCEDURE — 97110 THERAPEUTIC EXERCISES: CPT

## 2024-04-04 PROCEDURE — 93306 TTE W/DOPPLER COMPLETE: CPT

## 2024-04-04 PROCEDURE — 6370000000 HC RX 637 (ALT 250 FOR IP): Performed by: PSYCHIATRY & NEUROLOGY

## 2024-04-04 PROCEDURE — 85018 HEMOGLOBIN: CPT

## 2024-04-04 PROCEDURE — 85025 COMPLETE CBC W/AUTO DIFF WBC: CPT

## 2024-04-04 PROCEDURE — 99232 SBSQ HOSP IP/OBS MODERATE 35: CPT | Performed by: INTERNAL MEDICINE

## 2024-04-04 PROCEDURE — 6370000000 HC RX 637 (ALT 250 FOR IP): Performed by: STUDENT IN AN ORGANIZED HEALTH CARE EDUCATION/TRAINING PROGRAM

## 2024-04-04 PROCEDURE — 2580000003 HC RX 258: Performed by: PSYCHIATRY & NEUROLOGY

## 2024-04-04 PROCEDURE — 80053 COMPREHEN METABOLIC PANEL: CPT

## 2024-04-04 RX ORDER — HYDROCHLOROTHIAZIDE 25 MG/1
25 TABLET ORAL DAILY
Status: DISCONTINUED | OUTPATIENT
Start: 2024-04-04 | End: 2024-04-09 | Stop reason: HOSPADM

## 2024-04-04 RX ORDER — LISINOPRIL 20 MG/1
20 TABLET ORAL DAILY
Status: DISCONTINUED | OUTPATIENT
Start: 2024-04-04 | End: 2024-04-09 | Stop reason: HOSPADM

## 2024-04-04 RX ORDER — LISINOPRIL AND HYDROCHLOROTHIAZIDE 25; 20 MG/1; MG/1
1 TABLET ORAL DAILY
Status: DISCONTINUED | OUTPATIENT
Start: 2024-04-04 | End: 2024-04-04

## 2024-04-04 RX ADMIN — Medication 3 MG: at 22:13

## 2024-04-04 RX ADMIN — SENNOSIDES 8.8 MG: 8.8 LIQUID ORAL at 20:14

## 2024-04-04 RX ADMIN — SODIUM CHLORIDE, PRESERVATIVE FREE 10 ML: 5 INJECTION INTRAVENOUS at 09:13

## 2024-04-04 RX ADMIN — Medication 10 MG: at 06:57

## 2024-04-04 RX ADMIN — ROSUVASTATIN CALCIUM 40 MG: 20 TABLET, FILM COATED ORAL at 20:14

## 2024-04-04 RX ADMIN — OXYCODONE 10 MG: 5 TABLET ORAL at 13:03

## 2024-04-04 RX ADMIN — OXYCODONE 10 MG: 5 TABLET ORAL at 22:13

## 2024-04-04 RX ADMIN — OXYCODONE 10 MG: 5 TABLET ORAL at 17:15

## 2024-04-04 RX ADMIN — PANTOPRAZOLE SODIUM 40 MG: 40 TABLET, DELAYED RELEASE ORAL at 06:57

## 2024-04-04 RX ADMIN — SODIUM CHLORIDE, PRESERVATIVE FREE 10 ML: 5 INJECTION INTRAVENOUS at 20:14

## 2024-04-04 RX ADMIN — ASPIRIN 81 MG: 81 TABLET, COATED ORAL at 09:13

## 2024-04-04 RX ADMIN — APIXABAN 5 MG: 5 TABLET, FILM COATED ORAL at 11:59

## 2024-04-04 RX ADMIN — ACETAMINOPHEN 325MG 650 MG: 325 TABLET ORAL at 09:12

## 2024-04-04 RX ADMIN — Medication 10 MG: at 20:48

## 2024-04-04 RX ADMIN — ACETAMINOPHEN 325MG 650 MG: 325 TABLET ORAL at 20:13

## 2024-04-04 RX ADMIN — SENNOSIDES 8.8 MG: 8.8 LIQUID ORAL at 09:12

## 2024-04-04 RX ADMIN — LISINOPRIL 20 MG: 20 TABLET ORAL at 13:04

## 2024-04-04 RX ADMIN — HYDROCHLOROTHIAZIDE 25 MG: 25 TABLET ORAL at 13:03

## 2024-04-04 RX ADMIN — APIXABAN 5 MG: 5 TABLET, FILM COATED ORAL at 20:14

## 2024-04-04 RX ADMIN — OXYCODONE 10 MG: 5 TABLET ORAL at 09:13

## 2024-04-04 RX ADMIN — ACETAMINOPHEN 325MG 650 MG: 325 TABLET ORAL at 17:15

## 2024-04-04 RX ADMIN — Medication 10 MG: at 17:26

## 2024-04-04 RX ADMIN — IPRATROPIUM BROMIDE AND ALBUTEROL SULFATE 1 DOSE: 2.5; .5 SOLUTION RESPIRATORY (INHALATION) at 20:20

## 2024-04-04 RX ADMIN — Medication 10 MG: at 12:04

## 2024-04-04 ASSESSMENT — PAIN DESCRIPTION - FREQUENCY
FREQUENCY: INTERMITTENT
FREQUENCY: CONTINUOUS

## 2024-04-04 ASSESSMENT — PAIN SCALES - GENERAL
PAINLEVEL_OUTOF10: 10
PAINLEVEL_OUTOF10: 4
PAINLEVEL_OUTOF10: 7
PAINLEVEL_OUTOF10: 7
PAINLEVEL_OUTOF10: 4
PAINLEVEL_OUTOF10: 2
PAINLEVEL_OUTOF10: 6
PAINLEVEL_OUTOF10: 2
PAINLEVEL_OUTOF10: 6
PAINLEVEL_OUTOF10: 7

## 2024-04-04 ASSESSMENT — PAIN DESCRIPTION - LOCATION
LOCATION: BACK
LOCATION: BACK
LOCATION: BACK;NECK;LEG
LOCATION: BACK
LOCATION: BACK
LOCATION: LEG

## 2024-04-04 ASSESSMENT — PAIN DESCRIPTION - DESCRIPTORS
DESCRIPTORS: DISCOMFORT;ACHING
DESCRIPTORS: ACHING

## 2024-04-04 ASSESSMENT — PAIN DESCRIPTION - ORIENTATION
ORIENTATION: LOWER
ORIENTATION: DISTAL
ORIENTATION: LEFT
ORIENTATION: LEFT

## 2024-04-04 ASSESSMENT — PAIN DESCRIPTION - ONSET
ONSET: SUDDEN
ONSET: ON-GOING
ONSET: GRADUAL

## 2024-04-04 ASSESSMENT — PAIN DESCRIPTION - PAIN TYPE
TYPE: SURGICAL PAIN
TYPE: SURGICAL PAIN
TYPE: ACUTE PAIN

## 2024-04-04 NOTE — PLAN OF CARE
Problem: Discharge Planning  Goal: Discharge to home or other facility with appropriate resources  4/4/2024 0804 by Tamica Ponce RN  Outcome: Progressing     Problem: Safety - Adult  Goal: Free from fall injury  4/4/2024 0804 by Tamica Ponce RN  Outcome: Progressing     Problem: Pain  Goal: Verbalizes/displays adequate comfort level or baseline comfort level  4/4/2024 0804 by Tamica Ponce RN  Outcome: Progressing     Problem: ABCDS Injury Assessment  Goal: Absence of physical injury  4/4/2024 0804 by Tamica Ponce RN  Outcome: Progressing     Problem: Respiratory - Adult  Goal: Achieves optimal ventilation and oxygenation  4/4/2024 0804 by Tamica Ponce RN  Outcome: Progressing     Problem: Neurosensory - Adult  Goal: Achieves stable or improved neurological status  4/4/2024 0804 by Tamica Ponce RN  Outcome: Progressing     Problem: Cardiovascular - Adult  Goal: Maintains optimal cardiac output and hemodynamic stability  4/4/2024 0804 by Tamica Ponce RN  Outcome: Progressing     Problem: Skin/Tissue Integrity - Adult  Goal: Skin integrity remains intact  4/4/2024 0804 by Tamica Ponce RN  Outcome: Progressing     Problem: Musculoskeletal - Adult  Goal: Return mobility to safest level of function  4/4/2024 0804 by Tamica Ponce RN  Outcome: Progressing     Problem: Infection - Adult  Goal: Absence of infection at discharge  4/4/2024 0804 by Tamica Ponce RN  Outcome: Progressing

## 2024-04-04 NOTE — FLOWSHEET NOTE
Pt called out to use toilet. Writer assisted pt to bedside commode where pt urinated then bathed with chlorhexidine solution. Thigh high HARSHA hose applied. Linens and EKG patches changed. TLSO brace applied. Pt assisted to bedside for comfort with pillow support. Pt tolerated well.

## 2024-04-04 NOTE — CARE COORDINATION
Transitional planning: Met w/ patient about discharge plan and plans to go to [facility] and has been accepted and precert started. Call to [facility] to find out when precert was submitted and left a message for admissions to call CM.    4:30 PM Call again to [facility] about precert and already gone for the day. Will need to call tomorrow to follow up. Call to daughter Brandy and given update that are still waiting on approval from insurance and will make her aware as soon as we know.

## 2024-04-04 NOTE — PROGRESS NOTES
Speech Language Pathology  [facility]    Cognitive Treatment Note    Date: 4/4/2024  Patient’s Name: Annie Beltran  MRN: [id number]  Diagnosis:   Patient Active Problem List   Diagnosis Code    Lumbar post-laminectomy syndrome M96.1    Sacroiliitis, not elsewhere classified (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) I63.89    Post-op pain G89.18    Other pulmonary embolism without acute cor pulmonale ([facility]) I26.99    Dyslipidemia E78.5    Essential hypertension I10       Pain: 0/10    Cognitive Treatment    Treatment time: 1454-1999      Subjective: [x] Alert [x] Cooperative     [] Confused     [] Agitated    [] Lethargic      Objective/Assessment:    Recall: Short-term recall of 3-units (unrelated): 0/3 increased to 3/3 with mod verbal cues, 0/3 increased to 3/3 with min-mod verbal cues, 1/3 increased to 3/3 with min verbal cues. ST provided verbal education re: memory compensatory strategies to aid in recall. Strategies implemented with mod verbal cues.     Coding/grouping items for recall (pairing items): 8/10 increased to 10/10 with repetition and min verbal cues.   Word list retention (word placement): 3/5 increased to 4/5 with multiple repetitions. Note pt. With difficulty remembering order of words.     Problem Solving/Reasoning: Word deduction: 8/10 increased to 10/10 with min-mod verbal cues.  Multiple definitions: 10/10.  Analogies: 3/6 increased to 6/6 with min verbal cues.       Plan:  [x] Continue ST services    [] Discharge from ST:      Discharge recommendations: []  Further

## 2024-04-04 NOTE — PLAN OF CARE
Problem: Discharge Planning  Goal: Discharge to home or other facility with appropriate resources  4/4/2024 0509 by Glory Hylton RN  Outcome: Progressing  4/3/2024 1854 by Enda Bacon RN  Outcome: Progressing     Problem: Safety - Adult  Goal: Free from fall injury  4/4/2024 0509 by Glory Hylton RN  Outcome: Progressing  Flowsheets (Taken 4/3/2024 2100)  Free From Fall Injury:   Instruct family/caregiver on patient safety   Based on caregiver fall risk screen, instruct family/caregiver to ask for assistance with transferring infant if caregiver noted to have fall risk factors  4/3/2024 1854 by Edna Bacon RN  Outcome: Progressing     Problem: Pain  Goal: Verbalizes/displays adequate comfort level or baseline comfort level  4/4/2024 0509 by Glory Hylton RN  Outcome: Progressing  4/3/2024 1854 by Edna Bacon RN  Outcome: Progressing     Problem: ABCDS Injury Assessment  Goal: Absence of physical injury  4/4/2024 0509 by Glory Hylton RN  Outcome: Progressing  Flowsheets (Taken 4/3/2024 2100)  Absence of Physical Injury: Implement safety measures based on patient assessment  4/3/2024 1854 by Edna Bacon RN  Outcome: Progressing     Problem: Respiratory - Adult  Goal: Achieves optimal ventilation and oxygenation  4/4/2024 0509 by Glory Hylton RN  Outcome: Progressing  4/3/2024 1854 by Edna Bacon RN  Outcome: Progressing     Problem: Neurosensory - Adult  Goal: Achieves stable or improved neurological status  Outcome: Progressing  Goal: Absence of seizures  Outcome: Progressing  Goal: Remains free of injury related to seizures activity  Outcome: Progressing  Goal: Achieves maximal functionality and self care  Outcome: Progressing     Problem: Cardiovascular - Adult  Goal: Maintains optimal cardiac output and hemodynamic stability  Outcome: Progressing  Goal: Absence of cardiac dysrhythmias or at baseline  Outcome: Progressing     Problem:

## 2024-04-04 NOTE — PROGRESS NOTES
Progress Note    Patient:  Annie Beltran  YOB: 1945     79 y.o. female    Subjective:  Patient seen and examined at bedside this afternoon. I discussed elias and care with patient's son Rayshawn over the phone earlier as well. I reviewed with patient today her interval history: stroke and PE. No acute issues overnight per nursing. Pain well-controlled on current regimen. Denies: fever/chills, CP, SOB, N/V, dysuria, or numbness/tingling in extremities. Still has some facial droop and mild occasional speech difficulties. With standing and walking some left thigh and hip weakness, but legs and ankles fine. She has been mobilizing with PT. Ambulated 80 feet with PT today. Plan is for [facility] placement, awaiting pre-cert.    Objective:   Vitals:    04/04/24 1303   BP: (!) 175/66   Pulse: 71   Resp: 11   Temp:    SpO2: 97%     Gen: NAD, cooperative, sitting up comfortably in chair at bedside; TLSO brace around waist and back, unsnapped for now, mild facial droop  Abd: soft, nondistended, mildly tender; dressing in place to anterior abdomen, c/d/i  Back: Appropriate post-op TTP abdomen and lumbar. Surgical dressings in place to lumbar with mild dried drainage, no strikethrough  UE  L delt 4+/5, R delt 5/5  L biceps 5/5, R biceps 5/5  L WE 5/5, R WE 5/5  L triceps 5/5, R triceps 5/5  L WF 5/5, R WF 5/5  L FF 5/5, R FF 5/5  L intrinsics 5/5, R intrinsics 5/5  Sensation intact to light touch C5-T1 bilaterally    LE  Able to perform supine SLR on right without issue  3-/5 left HF and KE  L TA 5/5, R TA 5/5  L EHL 5/5, R EHL 5/5  L GSC 5/5, R GSC 5/5  Sensation intact to light touch L2-S1 bilaterally     Recent Labs     04/02/24  0951 04/02/24  1731 04/04/24  0301   WBC  --    < > 5.2   HGB  --    < > 7.6*   HCT  --    < > 23.7*   PLT  --    < > 144   INR 0.9  --   --    NA  --    < > 139   K  --    < > 3.9   BUN  --    < > 8   CREATININE  --    < > 0.6   GLUCOSE  --    < > 104*    < > = values

## 2024-04-04 NOTE — PROGRESS NOTES
Endovascular Neurosurgery Progress Note      Reason for evaluation: R CCA stenosis    SUBJECTIVE:   NAEO.        Systolic (24hrs), Av , Min:155 , Max:200         History of Chief Complaint:      79 y.o. female with right-sided Bell's palsy for 40 years, presbycusis, hyperlipidemia, hypertension, and chronic history of low back pain with remote lumbar surgery in the 1970s status post revision, status post DCM stimulator was admitted on 3/29/2024 for an anterior lumbar interbody fusion at L4 S1 as well as removal of her spinal cord stimulator.      On postop day 2.  At approximately 12 PM on 3/31/2024, patient's nurse noticed a new onset left facial droop.  Stroke alert was called at approximately 12:50 PM.  Upon arrival, patient's  and glucose 176.  On examination, patient had horizontal nystagmus as well as left upper extremity dysmetria with finger-nose-finger testing.  No obvious weakness was appreciated on examination.  No other focal deficits besides a chronic right facial weakness secondary to a remote right Bell's palsy.     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  MRI showed an acute right parietal stroke    Allergies  is allergic to ciprofloxacin.  Medications  Prior to Admission medications    Medication Sig Start Date End Date Taking? Authorizing Provider   oxyCODONE-acetaminophen (PERCOCET) 5-325 MG per tablet Take 1 tablet by mouth every 6 hours as needed for Pain for up to 7 days. Intended supply: 7 days. Take lowest dose possible to manage pain Max Daily Amount: 4 tablets 3/29/24 4/5/24 Yes Alberto Plasencia DO   loratadine (CLARITIN) 10 MG capsule Take 1 capsule by mouth daily    ProviderGeronimo MD   lisinopril-hydroCHLOROthiazide (PRINZIDE;ZESTORETIC) 20-25 MG per tablet Take 1 tablet by mouth daily    ProviderGeronimo MD   HYDROcodone-acetaminophen (NORCO) 5-325 MG per tablet Take 1 tablet by mouth nightly. Max  Daily Amount: 1 tablet    Geronimo Brower MD   diphenhydrAMINE-APAP, sleep, (ACETAMINOPHEN PM)  MG tablet Take 2 tablets by mouth nightly as needed for Sleep    Geronimo Brower MD   Ketotifen Fumarate (ALLERGY EYE DROPS OP) Apply 1 drop to eye nightly    Geronimo Brower MD   Azelastine HCl (ASTEPRO NA) 2 drops by Nasal route daily    Geronimo Brower MD   Acetaminophen (TYLENOL 8 HOUR ARTHRITIS PAIN PO) Take by mouth    Geronimo Brower MD   simvastatin (ZOCOR) 20 MG tablet Take 1 tablet by mouth nightly    Geronimo Brower MD    Scheduled Meds:   apixaban  5 mg Oral BID    ipratropium 0.5 mg-albuterol 2.5 mg  1 Dose Inhalation Q4H WA RT    rosuvastatin  40 mg Oral Nightly    midodrine  5 mg Oral TID WC    sodium chloride flush  5-40 mL IntraVENous 2 times per day    aspirin  81 mg Oral Daily    pantoprazole  40 mg Oral QAM AC    senna  5 mL Oral BID    acetaminophen  650 mg Oral Q6H     Continuous Infusions:   sodium chloride      sodium chloride      sodium chloride       PRN Meds:.bisacodyl, ipratropium 0.5 mg-albuterol 2.5 mg, melatonin, labetalol, sodium chloride, sodium chloride, potassium chloride **OR** potassium chloride, magnesium sulfate, acetaminophen **OR** acetaminophen, sodium chloride flush, sodium chloride, ondansetron **OR** ondansetron, polyethylene glycol, calcium carbonate, oxyCODONE **OR** oxyCODONE, tiZANidine, HYDROmorphone  Past Medical History   has a past medical history of Arthritis, Bell's palsy, Hearing loss, Hyperlipidemia, Hypertension, Lumbar disc disease, Seasonal allergies, Under care of service provider, and Wears glasses.  Past Surgical History   has a past surgical history that includes  section; Colonoscopy (); Appendectomy; Pain management procedure (Right, 2016); Pain management procedure (Right, 2016); Pain management procedure (2017); Nerve Block Lumb Facet Level 1 Bilateral (Right, 2017); Humerus

## 2024-04-04 NOTE — PROGRESS NOTES
Good  Requires PT Follow-Up: Yes  Activity Tolerance  Activity Tolerance: Patient limited by fatigue;Patient limited by endurance     Plan   Physical Therapy Plan  General Plan: 6-7 times per week  Current Treatment Recommendations: Strengthening, Functional mobility training, Transfer training, Endurance training, Stair training, Gait training, Safety education & training, Therapeutic activities  Safety Devices  Type of Devices: Call light within reach, Gait belt, Nurse notified, Left in chair, All fall risk precautions in place, Patient at risk for falls  Restraints  Restraints Initially in Place: No     Restrictions  Restrictions/Precautions  Restrictions/Precautions: Weight Bearing, General Precautions  Required Braces or Orthoses?: Yes  Lower Extremity Weight Bearing Restrictions  Partial Weight Bearing Percentage Or Pounds: Full weight bearing as tolerated  Partial Weight Bearing Percentage Or Pounds: Full weight bearing as tolerated  Required Braces or Orthoses  Spinal Other: Pt should wear LSO while out of bed and ambulating  Position Activity Restriction  Other position/activity restrictions: Beginning POD #1 1)  Up to bedside chair at least three times a day as tolerated. 2)  Ambulate in room progressing to hallway with assistive device four times daily (including PT) when PT advises.   3)  Bathroom privileges with assistance.; 3/31 stroke alert     Subjective   Pain: Pt states \"I really dont have any pain right now\"  General  Patient assessed for rehabilitation services?: Yes  Response To Previous Treatment: Patient with no complaints from previous session.  Family / Caregiver Present: Yes (son in law)  Follows Commands: Within Functional Limits  General Comment  Comments: Pt left seated in recliner with call light within reach, ISAEL present in room at this writer's exit  Subjective  Subjective: Pt and RN agreeable to PT. Pt seated in recliner upon arrival, very pleasant and cooperative with therapy.      Cognition   Orientation  Overall Orientation Status: Within Functional Limits  Orientation Level: Oriented X4  Cognition  Overall Cognitive Status: WFL     Objective   Pulse: 71  Heart Rate Source: Monitor  SpO2: 97 %  O2 Device: Nasal cannula (2L)     Observation/Palpation  Posture: Fair  Observation: pt stands with flexed posture      Bed mobility  Bed Mobility Comments: Pt in recliner upon arrival and exit.  Transfers  Sit to Stand: Minimal Assistance  Stand to Sit: Minimal Assistance  Comment: performed from EOC and from toilet to RW  Ambulation  Surface: Level tile  Device: Rolling Walker  Other Apparatus: O2 (2L)  Quality of Gait: slow pace, flexed posture  Gait Deviations: Decreased step height;Decreased step length  Distance: 80ft  Comments: Further ambulation distance limited by fatigue.  More Ambulation?: No  Stairs/Curb  Stairs?: No     Balance  Posture: Fair  Sitting - Static: Good  Sitting - Dynamic: Good  Standing - Static: Fair  Standing - Dynamic: Fair  Comments: Assessed with RW.  Exercise Treatment:   Seated LE exercise program: Long Arc Quads, hip abduction/adduction, heel/toe raises, and marches. Reps: 10x     AM-PAC - Mobility    AM-PAC Basic Mobility - Inpatient   How much help is needed turning from your back to your side while in a flat bed without using bedrails?: None  How much help is needed moving from lying on your back to sitting on the side of a flat bed without using bedrails?: None  How much help is needed moving to and from a bed to a chair?: A Little  How much help is needed standing up from a chair using your arms?: A Little  How much help is needed walking in hospital room?: A Little  How much help is needed climbing 3-5 steps with a railing?: A Lot  AM-PAC Inpatient Mobility Raw Score : 19  AM-PAC Inpatient T-Scale Score : 45.44  Mobility Inpatient CMS 0-100% Score: 41.77  Mobility Inpatient CMS G-Code Modifier : CK    Goals  Short Term Goals  Time Frame for Short Term Goals:

## 2024-04-04 NOTE — PROGRESS NOTES
[facility]  Office: [phone]  Enoch Hou DO, Irineo Pereira, DO, Teja Pedroza DO, Aleksandr Goff, DO, Arsenio Gary MD, Crys Schroeder MD, Micheal Johnson MD, Ally Cordova MD,  Sathish Poon MD, Julia Smith MD, Fatimah Infante MD,  Venancio Silverman DO, Dony Kaur MD, Zach Liao MD, Zbigniew Hou DO, Lucrecia Lima MD,  Herminio Simon DO, Uzma Reyes MD, Mary Lane MD, Peace Benoit MD, Philip Perez MD,  Ismael Landa MD, Helen Ybarra MD, Mario Campuzano MD, Kaity Green MD, Ender Leslie MD, Prudencio Lewis MD, Abraham Levine DO, Saad Borjas DO, Celena Mancilla MD,  Facundo Vaughn MD, Shirley Waterhouse, CNP,  Janet Hills CNP, Bala Street, CNP,  Mariah Grissom, DNP, Delia Collins, CNP, Erinn Estrada, CNP, Renae White, CNP, Chayo Desai, CNP, Shannon Marks, PA-C, Janis Mercer PA-C, Anya Lassiter, CNP, Alka Gregorio, CNP, Héctor Judd, CNP, Day Philip, CNP, Luda Melgar, CNP, Kizzy Mcleod, CNS, Tess Bullard, CNP, Amy Gomez CNP, Tracy Schwab, CNP         [facility]   IN-PATIENT SERVICE   [facility]    Progress Note    4/4/2024    12:29 PM    Name:   Annie Beltran  MRN:     [id number]     Acct:      [id number]   Room:   0130/0130-01   Day:  6  Admit Date:  3/29/2024  9:25 AM    PCP:   Shankar Brown MD  Code Status:  Full Code    Subjective:     C/C: Left-sided weakness  Interval History Status: .   Still has mild left-sided weakness, denies any double vision  Endovascular neurology has been following the patient, currently patient is only on aspirin and heparin drip  Constipation is resolved with Dulcolax suppository  Jo's catheter was removed yesterday-voiding  Blood pressure running high-189/82, heart rate 78  Brief History:       Patient is a 79-year-old female s/p ALIF with PI L4-S1 and removal of spinal stimulator, DOS: 3/29/24 who was admitted under Ortho surgery   On 3/31/2024 she  >90   CALCIUM 7.3* 7.6* 7.4*       Recent Labs     04/01/24  2350 04/03/24  0332 04/04/24  0301   PROT 5.1* 5.1* 5.0*   LABALBU 2.9* 2.9* 2.7*   AST 57* 44* 75*   ALT 14 12 31   ALKPHOS 171* 158* 378*   BILITOT 0.8 0.4 0.3       ABG:No results found for: \"POCPH\", \"PHART\", \"PH\", \"POCPCO2\", \"JUA4WEI\", \"PCO2\", \"POCPO2\", \"PO2ART\", \"PO2\", \"POCHCO3\", \"RPZ8ZSN\", \"HCO3\", \"NBEA\", \"PBEA\", \"BEART\", \"BE\", \"THGBART\", \"THB\", \"LLF6GCS\", \"EZZL7TQD\", \"C6BRTQDA\", \"O2SAT\", \"FIO2\"  Lab Results   Component Value Date/Time    SPECIAL NOT REPORTED 04/20/2012 09:33 AM     Lab Results   Component Value Date/Time    CULTURE ESCHERICHIA COLI >100,000 CFU/ML (A) 11/29/2023 12:01 PM       Radiology:  XR CHEST PORTABLE    Result Date: 4/2/2024  Airspace opacities involving the medial segment middle lobe may represent pneumonia or pulmonary infarct given history of middle lobe PE.     CT ABDOMEN PELVIS W IV CONTRAST Additional Contrast? None    Result Date: 4/1/2024  1. Choledocholithiasis with a 9 mm gallstone in the common bile duct inferiorly.  There is associated biliary dilatation with the common bile duct measuring up to 1 cm and there is distension of the gallbladder which is likely related.  A gallstone is also noted in the gallbladder. 2. 4.6 cm subcutaneous fluid collection on the right side of the back in the right flank region with overlying skin staples.  This does not have peripheral rim enhancement and is favored to represent a postoperative seroma. 3. No acute findings elsewhere in the abdomen or pelvis. 4. Anasarca with subcutaneous edema in the flanks and pelvis     CT CHEST PULMONARY EMBOLISM W CONTRAST    Result Date: 4/1/2024  Redemonstration of a pulmonary embolism within a branch supplying the right middle lobe.     CT LUMBAR SPINE WO CONTRAST    Result Date: 4/1/2024  1. No evidence of acute fracture or dislocation. 2. Multilevel degenerative disc disease and facet osteoarthritis as described above. There are findings of

## 2024-04-05 PROBLEM — R29.90 STROKE-LIKE SYMPTOM: Status: ACTIVE | Noted: 2024-04-05

## 2024-04-05 LAB
ALBUMIN SERPL-MCNC: 3 G/DL (ref 3.5–5.2)
ALBUMIN/GLOB SERPL: 1 {RATIO} (ref 1–2.5)
ALP SERPL-CCNC: 392 U/L (ref 35–104)
ALT SERPL-CCNC: 29 U/L (ref 10–35)
ANION GAP SERPL CALCULATED.3IONS-SCNC: 11 MMOL/L (ref 9–16)
ANTI-XA UNFRAC HEPARIN: >2 IU/L
AST SERPL-CCNC: 58 U/L (ref 10–35)
BILIRUB SERPL-MCNC: 0.4 MG/DL (ref 0–1.2)
BUN SERPL-MCNC: 7 MG/DL (ref 8–23)
CALCIUM SERPL-MCNC: 8.2 MG/DL (ref 8.6–10.4)
CHLORIDE SERPL-SCNC: 104 MMOL/L (ref 98–107)
CO2 SERPL-SCNC: 23 MMOL/L (ref 20–31)
CREAT SERPL-MCNC: 0.7 MG/DL (ref 0.5–0.9)
GFR SERPL CREATININE-BSD FRML MDRD: 88 ML/MIN/1.73M2
GLUCOSE SERPL-MCNC: 94 MG/DL (ref 74–99)
HCT VFR BLD AUTO: 27 % (ref 36.3–47.1)
HGB BLD-MCNC: 8.4 G/DL (ref 11.9–15.1)
POTASSIUM SERPL-SCNC: 4.1 MMOL/L (ref 3.7–5.3)
PROT SERPL-MCNC: 5.6 G/DL (ref 6.6–8.7)
SODIUM SERPL-SCNC: 138 MMOL/L (ref 136–145)

## 2024-04-05 PROCEDURE — 6360000002 HC RX W HCPCS

## 2024-04-05 PROCEDURE — 97130 THER IVNTJ EA ADDL 15 MIN: CPT

## 2024-04-05 PROCEDURE — 6370000000 HC RX 637 (ALT 250 FOR IP): Performed by: STUDENT IN AN ORGANIZED HEALTH CARE EDUCATION/TRAINING PROGRAM

## 2024-04-05 PROCEDURE — 6370000000 HC RX 637 (ALT 250 FOR IP): Performed by: INTERNAL MEDICINE

## 2024-04-05 PROCEDURE — 2060000000 HC ICU INTERMEDIATE R&B

## 2024-04-05 PROCEDURE — 6370000000 HC RX 637 (ALT 250 FOR IP): Performed by: NURSE PRACTITIONER

## 2024-04-05 PROCEDURE — 6360000002 HC RX W HCPCS: Performed by: REGISTERED NURSE

## 2024-04-05 PROCEDURE — 2580000003 HC RX 258: Performed by: PSYCHIATRY & NEUROLOGY

## 2024-04-05 PROCEDURE — 6370000000 HC RX 637 (ALT 250 FOR IP): Performed by: HOSPITALIST

## 2024-04-05 PROCEDURE — 97116 GAIT TRAINING THERAPY: CPT

## 2024-04-05 PROCEDURE — 6370000000 HC RX 637 (ALT 250 FOR IP)

## 2024-04-05 PROCEDURE — 97129 THER IVNTJ 1ST 15 MIN: CPT

## 2024-04-05 PROCEDURE — 85018 HEMOGLOBIN: CPT

## 2024-04-05 PROCEDURE — 6370000000 HC RX 637 (ALT 250 FOR IP): Performed by: PSYCHIATRY & NEUROLOGY

## 2024-04-05 PROCEDURE — 85520 HEPARIN ASSAY: CPT

## 2024-04-05 PROCEDURE — 80053 COMPREHEN METABOLIC PANEL: CPT

## 2024-04-05 PROCEDURE — 6370000000 HC RX 637 (ALT 250 FOR IP): Performed by: ORTHOPAEDIC SURGERY

## 2024-04-05 PROCEDURE — 97110 THERAPEUTIC EXERCISES: CPT

## 2024-04-05 PROCEDURE — 97530 THERAPEUTIC ACTIVITIES: CPT

## 2024-04-05 PROCEDURE — 99233 SBSQ HOSP IP/OBS HIGH 50: CPT | Performed by: HOSPITALIST

## 2024-04-05 PROCEDURE — 85014 HEMATOCRIT: CPT

## 2024-04-05 PROCEDURE — 36415 COLL VENOUS BLD VENIPUNCTURE: CPT

## 2024-04-05 RX ORDER — IPRATROPIUM BROMIDE AND ALBUTEROL SULFATE 2.5; .5 MG/3ML; MG/3ML
1 SOLUTION RESPIRATORY (INHALATION) EVERY 4 HOURS PRN
Status: DISCONTINUED | OUTPATIENT
Start: 2024-04-05 | End: 2024-04-09 | Stop reason: HOSPADM

## 2024-04-05 RX ADMIN — SODIUM CHLORIDE, PRESERVATIVE FREE 10 ML: 5 INJECTION INTRAVENOUS at 09:20

## 2024-04-05 RX ADMIN — HYDROMORPHONE HYDROCHLORIDE 0.5 MG: 1 INJECTION, SOLUTION INTRAMUSCULAR; INTRAVENOUS; SUBCUTANEOUS at 14:16

## 2024-04-05 RX ADMIN — ROSUVASTATIN CALCIUM 40 MG: 20 TABLET, FILM COATED ORAL at 21:21

## 2024-04-05 RX ADMIN — PANTOPRAZOLE SODIUM 40 MG: 40 TABLET, DELAYED RELEASE ORAL at 09:20

## 2024-04-05 RX ADMIN — OXYCODONE 10 MG: 5 TABLET ORAL at 17:51

## 2024-04-05 RX ADMIN — ACETAMINOPHEN 325MG 650 MG: 325 TABLET ORAL at 21:21

## 2024-04-05 RX ADMIN — OXYCODONE 10 MG: 5 TABLET ORAL at 06:57

## 2024-04-05 RX ADMIN — APIXABAN 5 MG: 5 TABLET, FILM COATED ORAL at 09:13

## 2024-04-05 RX ADMIN — ACETAMINOPHEN 325MG 650 MG: 325 TABLET ORAL at 04:09

## 2024-04-05 RX ADMIN — SODIUM CHLORIDE, PRESERVATIVE FREE 10 ML: 5 INJECTION INTRAVENOUS at 21:24

## 2024-04-05 RX ADMIN — ACETAMINOPHEN 325MG 650 MG: 325 TABLET ORAL at 09:13

## 2024-04-05 RX ADMIN — BISACODYL 10 MG: 10 SUPPOSITORY RECTAL at 19:19

## 2024-04-05 RX ADMIN — APIXABAN 10 MG: 5 TABLET, FILM COATED ORAL at 21:21

## 2024-04-05 RX ADMIN — Medication 10 MG: at 00:00

## 2024-04-05 RX ADMIN — TIZANIDINE 2 MG: 2 TABLET ORAL at 23:15

## 2024-04-05 RX ADMIN — SENNOSIDES 8.8 MG: 8.8 LIQUID ORAL at 21:24

## 2024-04-05 RX ADMIN — POLYETHYLENE GLYCOL 3350 17 G: 17 POWDER, FOR SOLUTION ORAL at 11:42

## 2024-04-05 RX ADMIN — HYDROMORPHONE HYDROCHLORIDE 0.5 MG: 1 INJECTION, SOLUTION INTRAMUSCULAR; INTRAVENOUS; SUBCUTANEOUS at 19:14

## 2024-04-05 RX ADMIN — OXYCODONE 5 MG: 5 TABLET ORAL at 11:42

## 2024-04-05 RX ADMIN — TIZANIDINE 2 MG: 2 TABLET ORAL at 00:00

## 2024-04-05 RX ADMIN — ANTACID TABLETS 500 MG: 500 TABLET, CHEWABLE ORAL at 09:13

## 2024-04-05 RX ADMIN — Medication 3 MG: at 21:21

## 2024-04-05 RX ADMIN — ASPIRIN 81 MG: 81 TABLET, COATED ORAL at 09:13

## 2024-04-05 RX ADMIN — HYDROCHLOROTHIAZIDE 25 MG: 25 TABLET ORAL at 09:14

## 2024-04-05 RX ADMIN — LISINOPRIL 20 MG: 20 TABLET ORAL at 09:13

## 2024-04-05 RX ADMIN — ACETAMINOPHEN 325MG 650 MG: 325 TABLET ORAL at 14:16

## 2024-04-05 RX ADMIN — SENNOSIDES 8.8 MG: 8.8 LIQUID ORAL at 09:14

## 2024-04-05 ASSESSMENT — PAIN - FUNCTIONAL ASSESSMENT
PAIN_FUNCTIONAL_ASSESSMENT: PREVENTS OR INTERFERES WITH MANY ACTIVE NOT PASSIVE ACTIVITIES
PAIN_FUNCTIONAL_ASSESSMENT: PREVENTS OR INTERFERES WITH ALL ACTIVE AND SOME PASSIVE ACTIVITIES
PAIN_FUNCTIONAL_ASSESSMENT: ACTIVITIES ARE NOT PREVENTED
PAIN_FUNCTIONAL_ASSESSMENT: PREVENTS OR INTERFERES WITH MANY ACTIVE NOT PASSIVE ACTIVITIES
PAIN_FUNCTIONAL_ASSESSMENT: PREVENTS OR INTERFERES WITH MANY ACTIVE NOT PASSIVE ACTIVITIES

## 2024-04-05 ASSESSMENT — PAIN SCALES - GENERAL
PAINLEVEL_OUTOF10: 2
PAINLEVEL_OUTOF10: 8
PAINLEVEL_OUTOF10: 8
PAINLEVEL_OUTOF10: 4
PAINLEVEL_OUTOF10: 5
PAINLEVEL_OUTOF10: 4
PAINLEVEL_OUTOF10: 7
PAINLEVEL_OUTOF10: 8
PAINLEVEL_OUTOF10: 0
PAINLEVEL_OUTOF10: 6
PAINLEVEL_OUTOF10: 8
PAINLEVEL_OUTOF10: 0
PAINLEVEL_OUTOF10: 4
PAINLEVEL_OUTOF10: 4
PAINLEVEL_OUTOF10: 6
PAINLEVEL_OUTOF10: 5
PAINLEVEL_OUTOF10: 5
PAINLEVEL_OUTOF10: 7
PAINLEVEL_OUTOF10: 1
PAINLEVEL_OUTOF10: 1

## 2024-04-05 ASSESSMENT — PAIN DESCRIPTION - FREQUENCY: FREQUENCY: INTERMITTENT

## 2024-04-05 ASSESSMENT — PAIN SCALES - WONG BAKER
WONGBAKER_NUMERICALRESPONSE: NO HURT
WONGBAKER_NUMERICALRESPONSE: HURTS A LITTLE BIT
WONGBAKER_NUMERICALRESPONSE: NO HURT
WONGBAKER_NUMERICALRESPONSE: HURTS A LITTLE BIT
WONGBAKER_NUMERICALRESPONSE: NO HURT
WONGBAKER_NUMERICALRESPONSE: HURTS A LITTLE BIT
WONGBAKER_NUMERICALRESPONSE: NO HURT
WONGBAKER_NUMERICALRESPONSE: HURTS LITTLE MORE

## 2024-04-05 ASSESSMENT — PAIN DESCRIPTION - ORIENTATION
ORIENTATION: RIGHT;LEFT
ORIENTATION: POSTERIOR
ORIENTATION: RIGHT;LEFT
ORIENTATION: POSTERIOR
ORIENTATION: MID

## 2024-04-05 ASSESSMENT — PAIN DESCRIPTION - DESCRIPTORS
DESCRIPTORS: PRESSURE;SHARP
DESCRIPTORS: STABBING
DESCRIPTORS: ACHING;CRAMPING;DISCOMFORT
DESCRIPTORS: DULL
DESCRIPTORS: JABBING
DESCRIPTORS: ACHING;CRAMPING;DISCOMFORT

## 2024-04-05 ASSESSMENT — PAIN DESCRIPTION - LOCATION
LOCATION: BACK
LOCATION: HIP
LOCATION: OTHER (COMMENT)
LOCATION: BACK

## 2024-04-05 ASSESSMENT — PAIN DESCRIPTION - PAIN TYPE: TYPE: SURGICAL PAIN

## 2024-04-05 ASSESSMENT — PAIN DESCRIPTION - ONSET: ONSET: GRADUAL

## 2024-04-05 NOTE — CARE COORDINATION
Transitional  Planning  VMM received from Griselda at [facility], auth still pending.      358 pm Call from Griselda Tinajero [facility], auth still pending, able to accept over the weekend if auth is received.

## 2024-04-05 NOTE — PROGRESS NOTES
Physical Therapy  Facility/Department: [address] STEPDOWN  Physical Therapy Daily Treatment Note    Name: Annie Beltran  : 1945  MRN: [id number]  Date of Service: 2024    Discharge Recommendations:  Patient would benefit from continued therapy after discharge   PT Equipment Recommendations  Equipment Needed: Yes  Mobility Devices: Walker  Walker: Rolling      Patient Diagnosis(es): The primary encounter diagnosis was Post-op pain. Diagnoses of Stroke-like symptom, Cerebral infarction, watershed distribution, unilateral, acute (HCC), and Other acute pulmonary embolism without acute cor pulmonale (HCC) were also pertinent to this visit.  Past Medical History:  has a past medical history of Arthritis, Bell's palsy, Hearing loss, Hyperlipidemia, Hypertension, Lumbar disc disease, Seasonal allergies, Under care of service provider, and Wears glasses.  Past Surgical History:  has a past surgical history that includes  section; Colonoscopy (); Appendectomy; Pain management procedure (Right, 2016); Pain management procedure (Right, 2016); Pain management procedure (2017); Nerve Block Lumb Facet Level 1 Bilateral (Right, 2017); Humerus Closed Reduction (Right, 2021); back surgery; Pain management procedure (2020); Shoulder Arthroplasty (Right, 10/18/2021); Spinal Cord Stimulator Surgery (); Spinal cord stimulator removal (2024); lumbar fusion (N/A, 3/29/2024); Spine surgery (N/A, 3/29/2024); and lumbar fusion (N/A, 3/29/2024).    Assessment   Body Structures, Functions, Activity Limitations Requiring Skilled Therapeutic Intervention: Decreased functional mobility ;Decreased strength;Decreased endurance;Decreased balance  Assessment: Pt wore LSO during treatment and performed seated exercises to begin session. Pt then performed STS transfers with CGA-Valerie to/from bedside chair. Pt ambulated 90 ft without O2 CGA and RW, gait belt for pt safety. Pt c/o 7/10 pain in  Good  Sitting - Dynamic: Good  Standing - Static: Fair  Standing - Dynamic: Fair  Comments: Assessed with RW.  Exercise Treatment: Seated exercises: toe/heel raises, ankle pumps, marches, LAQ's, bicep curls x 20 reps. Pt required Valerie to perform marches with LLE due to weakness.      [facility] - Mobility  AM-PAC Basic Mobility - Inpatient   How much help is needed turning from your back to your side while in a flat bed without using bedrails?: None  How much help is needed moving from lying on your back to sitting on the side of a flat bed without using bedrails?: None  How much help is needed moving to and from a bed to a chair?: A Little  How much help is needed standing up from a chair using your arms?: A Little  How much help is needed walking in hospital room?: A Little  How much help is needed climbing 3-5 steps with a railing?: A Lot  AM-PAC Inpatient Mobility Raw Score : 19  AM-PAC Inpatient T-Scale Score : 45.44  Mobility Inpatient CMS 0-100% Score: 41.77  Mobility Inpatient CMS G-Code Modifier : CK    Goals  Short Term Goals  Time Frame for Short Term Goals: 10 visits  Short Term Goal 1: transfers with SBA  Short Term Goal 2: amb 150 ft with a RW x SBA  Short Term Goal 3: ascend/descend 4 steps with SBA  Short Term Goal 4: 20 min strengthening exercises x SBA       Education  Patient Education  Education Given To: Patient  Education Provided: Role of Therapy;Plan of Care  Education Method: Verbal  Barriers to Learning: None  Education Outcome: Verbalized understanding      Therapy Time   Individual Concurrent Group Co-treatment   Time In 1002         Time Out 1041         Minutes 39         Timed Code Treatment Minutes: 39 Minutes       ASIM Robins     Treatment performed by Student PTA under the supervision of co-signing PTA who agrees with all treatment and documentation.     Aaron Crandall PTA

## 2024-04-05 NOTE — RT PROTOCOL NOTE
RT Inhaler-Nebulizer Bronchodilator Protocol Note    There is a bronchodilator order in the chart from a provider indicating to follow the RT Bronchodilator Protocol and there is an “Initiate RT Inhaler-Nebulizer Bronchodilator Protocol” order as well (see protocol at bottom of note).    CXR Findings:  No results found.    The findings from the last RT Protocol Assessment were as follows:   History Pulmonary Disease: Smoker 15 pack years or more  Respiratory Pattern: Regular pattern and RR 12-20 bpm  Breath Sounds: Clear breath sounds  Cough: Strong, spontaneous, non-productive  Indication for Bronchodilator Therapy:    Bronchodilator Assessment Score: 1    Aerosolized bronchodilator medication orders have been revised according to the RT Inhaler-Nebulizer Bronchodilator Protocol below.    Respiratory Therapist to perform RT Therapy Protocol Assessment initially then follow the protocol.  Repeat RT Therapy Protocol Assessment PRN for score 0-3 or on second treatment, BID, and PRN for scores above 3.    No Indications - adjust the frequency to every 6 hours PRN wheezing or bronchospasm, if no treatments needed after 48 hours then discontinue using Per Protocol order mode.     If indication present, adjust the RT bronchodilator orders based on the Bronchodilator Assessment Score as indicated below.  Use Inhaler orders unless patient has one or more of the following: on home nebulizer, not able to hold breath for 10 seconds, is not alert and oriented, cannot activate and use MDI correctly, or respiratory rate 25 breaths per minute or more, then use the equivalent nebulizer order(s) with same Frequency and PRN reasons based on the score.  If a patient is on this medication at home then do not decrease Frequency below that used at home.    0-3 - enter or revise RT bronchodilator order(s) to equivalent RT Bronchodilator order with Frequency of every 4 hours PRN for wheezing or increased work of breathing using Per  Protocol order mode.        4-6 - enter or revise RT Bronchodilator order(s) to two equivalent RT bronchodilator orders with one order with BID Frequency and one order with Frequency of every 4 hours PRN wheezing or increased work of breathing using Per Protocol order mode.        7-10 - enter or revise RT Bronchodilator order(s) to two equivalent RT bronchodilator orders with one order with TID Frequency and one order with Frequency of every 4 hours PRN wheezing or increased work of breathing using Per Protocol order mode.       11-13 - enter or revise RT Bronchodilator order(s) to one equivalent RT bronchodilator order with QID Frequency and an Albuterol order with Frequency of every 4 hours PRN wheezing or increased work of breathing using Per Protocol order mode.      Greater than 13 - enter or revise RT Bronchodilator order(s) to one equivalent RT bronchodilator order with every 4 hours Frequency and an Albuterol order with Frequency of every 2 hours PRN wheezing or increased work of breathing using Per Protocol order mode.     RT to enter RT Home Evaluation for COPD & MDI Assessment order using Per Protocol order mode.    Electronically signed by AYAN LOREDO RCP on 4/5/2024 at 9:02 AM

## 2024-04-05 NOTE — PROGRESS NOTES
Comprehensive Nutrition Assessment    Type and Reason for Visit:  RD Nutrition Re-Screen/LOS    Nutrition Recommendations/Plan:   Continue current diet, Regular  Will send high kcal/high PRO ONS BID, no chocolate flavor  Monitor/encourage Po intake     Malnutrition Assessment:  Malnutrition Status:  At risk for malnutrition (Comment) (04/05/24 1343)    Context:  Acute Illness     Findings of the 6 clinical characteristics of malnutrition:  Energy Intake:  50% or less of estimated energy requirements for 5 or more days  Weight Loss:  No significant weight loss     Body Fat Loss:  No significant body fat loss     Muscle Mass Loss:  No significant muscle mass loss    Fluid Accumulation:  Mild     Strength:  Not Performed    Nutrition Assessment:    Pt seen for LOS. 80 yo F adm s/p lumbar spinal fusino (3/29). Pt reports poor appetite, endorses ~25% PO intake this admission, states she is trying to force herself to eat. Pt is agreeable to ONS, no chocolate flavor. Encouraged pt to drink ONS between meals, pt agreeable. LBM 4/4. +1 generalized, +1 pitting RLE, +2 pitting LLE edema noted. No wt loss noted per chart review.    Nutrition Related Findings:    Labs/meds reviewed Wound Type: Multiple, Surgical Incision       Current Nutrition Intake & Therapies:    Average Meal Intake: 1-25% (per pt)  Average Supplements Intake: None Ordered  ADULT DIET; Regular    Anthropometric Measures:  Height: 149.9 cm (4' 11.02\")  Ideal Body Weight (IBW): 95 lbs (43 kg)       Current Body Weight: 66.2 kg (145 lb 15.1 oz) (4/4/24), 153.6 % IBW.    Current BMI (kg/m2): 29.5  Usual Body Weight: 60.8 kg (134 lb 0.6 oz) (12/14/23)  % Weight Change (Calculated): 8.9  Weight Adjustment For: No Adjustment                 BMI Categories: Overweight (BMI 25.0-29.9)    Estimated Daily Nutrient Needs:  Energy Requirements Based On: Formula  Weight Used for Energy Requirements: Current  Energy (kcal/day): 1200 to 1400 kcal/day  Weight Used for  Protein Requirements: Current  Protein (g/day): 75 to 85 g/day  Method Used for Fluid Requirements: 1 ml/kcal  Fluid (ml/day): 1200 to 1400 ml/day    Nutrition Diagnosis:   Inadequate oral intake related to early satiety as evidenced by intake 0-25%    Nutrition Interventions:   Food and/or Nutrient Delivery: Continue Current Diet, Start Oral Nutrition Supplement  Nutrition Education/Counseling: No recommendation at this time  Coordination of Nutrition Care: Continue to monitor while inpatient       Goals:  Previous Goal Met:  (goal set)  Goals: PO intake 50% or greater, prior to discharge       Nutrition Monitoring and Evaluation:   Behavioral-Environmental Outcomes: None Identified  Food/Nutrient Intake Outcomes: Food and Nutrient Intake, Supplement Intake  Physical Signs/Symptoms Outcomes: Biochemical Data, Weight, Skin, Fluid Status or Edema    Discharge Planning:    Too soon to determine     Mandi Mills MS, RD, LD  Contact:   Floor Mobile: [phone]  Desk Phone: [phone]  RD Weekend Mobile: [phone]

## 2024-04-05 NOTE — PLAN OF CARE
Problem: Discharge Planning  Goal: Discharge to home or other facility with appropriate resources  Outcome: Progressing     Problem: Safety - Adult  Goal: Free from fall injury  Outcome: Progressing     Problem: Pain  Goal: Verbalizes/displays adequate comfort level or baseline comfort level  Outcome: Progressing  Flowsheets (Taken 4/4/2024 2015 by Trudy Sharma RN)  Verbalizes/displays adequate comfort level or baseline comfort level:   Encourage patient to monitor pain and request assistance   Assess pain using appropriate pain scale     Problem: ABCDS Injury Assessment  Goal: Absence of physical injury  Outcome: Progressing     Problem: Respiratory - Adult  Goal: Achieves optimal ventilation and oxygenation  Outcome: Progressing  Flowsheets (Taken 4/4/2024 2300)  Achieves optimal ventilation and oxygenation: Assess for changes in respiratory status     Problem: Neurosensory - Adult  Goal: Achieves stable or improved neurological status  Outcome: Progressing     Problem: Neurosensory - Adult  Goal: Absence of seizures  Outcome: Progressing     Problem: Neurosensory - Adult  Goal: Remains free of injury related to seizures activity  Outcome: Progressing     Problem: Neurosensory - Adult  Goal: Achieves maximal functionality and self care  Outcome: Progressing     Problem: Cardiovascular - Adult  Goal: Maintains optimal cardiac output and hemodynamic stability  Outcome: Progressing     Problem: Cardiovascular - Adult  Goal: Absence of cardiac dysrhythmias or at baseline  Outcome: Progressing     Problem: Skin/Tissue Integrity - Adult  Goal: Skin integrity remains intact  Outcome: Progressing  Goal: Incisions, wounds, or drain sites healing without S/S of infection  Outcome: Progressing     Problem: Musculoskeletal - Adult  Goal: Return mobility to safest level of function  Outcome: Progressing  Goal: Maintain proper alignment of affected body part  Outcome: Progressing  Goal: Return ADL status to a safe level

## 2024-04-05 NOTE — PLAN OF CARE
Problem: Discharge Planning  Goal: Discharge to home or other facility with appropriate resources  4/5/2024 1810 by Chantale Verde RN  Outcome: Progressing  Flowsheets (Taken 4/5/2024 1810)  Discharge to home or other facility with appropriate resources: Identify barriers to discharge with patient and caregiver  4/5/2024 0524 by Lainey Diaz RN  Outcome: Progressing     Problem: Safety - Adult  Goal: Free from fall injury  4/5/2024 1810 by Chantale Verde RN  Outcome: Progressing  Flowsheets (Taken 4/3/2024 2100 by Glory Hylton, RN)  Free From Fall Injury:   Instruct family/caregiver on patient safety   Based on caregiver fall risk screen, instruct family/caregiver to ask for assistance with transferring infant if caregiver noted to have fall risk factors  4/5/2024 0524 by Lainey Diaz RN  Outcome: Progressing     Problem: Pain  Goal: Verbalizes/displays adequate comfort level or baseline comfort level  4/5/2024 1810 by Chantale Verde RN  Outcome: Progressing  Flowsheets (Taken 4/4/2024 2015 by Trudy Sharma, RN)  Verbalizes/displays adequate comfort level or baseline comfort level:   Encourage patient to monitor pain and request assistance   Assess pain using appropriate pain scale  4/5/2024 0524 by Lainey Diaz RN  Outcome: Progressing  Flowsheets (Taken 4/4/2024 2015 by Trudy Sharma, RN)  Verbalizes/displays adequate comfort level or baseline comfort level:   Encourage patient to monitor pain and request assistance   Assess pain using appropriate pain scale     Problem: ABCDS Injury Assessment  Goal: Absence of physical injury  4/5/2024 1810 by Chantale Verde, RN  Outcome: Progressing  Flowsheets (Taken 4/3/2024 2100 by Glory Hylton, RN)  Absence of Physical Injury: Implement safety measures based on patient assessment  4/5/2024 0524 by Lainey Diaz RN  Outcome: Progressing     Problem: Respiratory - Adult  Goal: Achieves optimal ventilation

## 2024-04-05 NOTE — CARE COORDINATION
Transitional planning  [phone].  Left VM inquiring about status of precert.  Will await return call.

## 2024-04-05 NOTE — PROGRESS NOTES
[facility]  Office: [phone]  Enoch Hou DO, Irineo Pereira, DO, Teja Pedroza DO, Aleksandr Goff, DO, Arsenio Gary MD, Crys Schroeder MD, Micheal Johnson MD, Ally Cordova MD,  Sathish Poon MD, Julia Smith MD, Fatimah Infante MD,  Venancio Silverman DO, Dony Kaur MD, Zach Liao MD, Zbigniew Hou DO, Lucrecia Lima MD,  Herminio Simon DO, Uzma Reyes MD, Mary Lane MD, Peace Benoit MD, Philip Perez MD,  Ismael Landa MD, Helen Ybarra MD, Mario Campuzano MD, Kaity Green MD, Ender Leslie MD, Prudencio Lewis MD, Abraham Levine DO, Saad Borjas DO, Celena Mancilla MD,  Facundo Vaughn MD, Shirley Waterhouse, CNP,  Janet Hills CNP, Bala Street, CNP,  Mariah Grissom, DNP, Delia Collins, CNP, Erinn Estrada, CNP, Renae White, CNP, Chayo Desai, CNP, Shannon Marks, PA-C, Janis Mercer PA-C, Anya Lassiter, CNP, Alka Gregorio, CNP, Héctor Judd, CNP, Day Philip, CNP, Luda Melgar, CNP, Kizzy Mcleod, CNS, Tess Bullard, CNP, Amy Gomez CNP, Tracy Schwab, CNP         [facility]   IN-PATIENT SERVICE   [facility]    Progress Note    4/5/2024    3:29 PM    Name:   Annie Beltran  MRN:     [id number]     Acct:      [id number]   Room:   0133/0133-01  IP Day:  7  Admit Date:  3/29/2024  9:25 AM    PCP:   Shankar Brown MD  Code Status:  Full Code    Subjective:     C/C: Left-sided weakness  Interval History Status: .   Still has mild left-sided weakness, denies any double vision  Endovascular neurology has been following the patient, currently patient is only on aspirin and heparin drip  Constipation is resolved with Dulcolax suppository  Jo's catheter was removed yesterday-voiding  Blood pressure running high-189/82, heart rate 78  Brief History:       Patient is a 79-year-old female s/p ALIF with PI L4-S1 and removal of spinal stimulator, DOS: 3/29/24 who was admitted under Ortho surgery   On 3/31/2024 she was  Mild central stenosis at L1-L2 level. 4. Neural foraminal stenosis at multiple levels as described above. 5. Moderate to severe levorotatory scoliosis in the mid and upper lumbar spine and lower thoracic spine. 6. No evidence of hemorrhage or abnormal fluid collection in the prevertebral or paraspinal soft tissues.     US GALLBLADDER RUQ    Result Date: 4/1/2024  1. Distended gallbladder with moderate amount of sludge and prominent stone in the neck of the gallbladder. No sonographic Morgan sign or pericholecystic fluid collection. 2. No biliary ductal dilatation. 3. Visualized portions of the pancreas appear to be echogenic without any focal abnormality of rest of pancreas is obscured by shadowing from bowel gas. 4. Normal liver. 5. No evidence of right hydronephrosis. 6. No evidence of right upper quadrant ascites.     CT CHEST PULMONARY EMBOLISM W CONTRAST    Result Date: 3/31/2024  Questionable right middle lobe segmental pulmonary embolism versus artifact. Coronary artery disease. Distended gallbladder and cholelithiasis.  Recommend gallbladder ultrasound.     MRI LIMITED BRAIN    Result Date: 3/31/2024  Scattered small acute to subacute infarctions in the right parietooccipital lobes and mildly in the posterior right frontal lobe. Moderate chronic microvascular disease.     XR THORACIC SPINE (2 VIEWS)    Result Date: 3/31/2024  No retained spinal cord stimulator lead identified.     XR ABDOMEN (KUB) (SINGLE AP VIEW)    Result Date: 3/31/2024  1. No evidence abandoned lead. 2. No radiopaque urinary collecting system calculus evident. 3. Unremarkable bowel gas pattern. 4. No definite contraindication to MRI demonstrated.     CTA HEAD NECK W CONTRAST    Result Date: 3/31/2024  Minimal flow within the right common carotid artery with apparent occlusion at the level of the proximal aspect of the carotid bulb that is age-indeterminate.  There is normal flow in the distal aspect of the carotid bulb and within the

## 2024-04-05 NOTE — PROGRESS NOTES
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    Status post lumbar spinal fusion Z98.1    Status post lumbar spine operation Z98.890    Cerebral infarction, watershed distribution, unilateral, acute ([facility]) I63.89    Post-op pain G89.18    Other pulmonary embolism without acute cor pulmonale ([facility]) I26.99    Dyslipidemia E78.5    Essential hypertension I10       Pain: 0/10    Cognitive Treatment    Treatment time: 6550-8152      Subjective: [x] Alert [x] Cooperative     [] Confused     [] Agitated    [] Lethargic      Objective/Assessment:    Recall: Short-term recall of 3-units (related): 0/3 increased to 3/3 with min-mod verbal cues, 2/3 increased to 3/3 with min verbal cues, 2/3 increased to 3/3 with min verbal cues. Memory compensatory strategies implemented with min verbal cues.     Memory for shapes/pictures (picture retention): 5/11 increased to 11/11 with min verbal cues.   Memory and mental manipulation (word order--3 words): 7/10 increased to 10/10 with 1 self correction, repetition, and min verbal cues.     Problem Solving/Reasoning: Comparing sentence content: 5/6 increased to 6/6 with min verbal cues.   Wrong category (concrete): 9/10 increased to 10/10 with repetition and min verbal cues.   Multiple sentence formulation: 7/10 increased to 10/10 with min verbal cues.       Plan:  [x] Continue ST services    [] Discharge from ST:      Discharge recommendations: []  Further  therapy recommended at discharge.The patient should be able to tolerate at least 3 hours of therapy per day over 5 days or 15 hours over 7 days. [x] Further therapy recommended at discharge.   [] No therapy recommended at discharge.          Completed by: Toma Benitez  Clinician    Cosigned By: Amanda Bowser M.S.CCC/SLP

## 2024-04-06 LAB
ALBUMIN SERPL-MCNC: 2.9 G/DL (ref 3.5–5.2)
ALBUMIN/GLOB SERPL: 1 {RATIO} (ref 1–2.5)
ALP SERPL-CCNC: 314 U/L (ref 35–104)
ALT SERPL-CCNC: 27 U/L (ref 10–35)
ANION GAP SERPL CALCULATED.3IONS-SCNC: 9 MMOL/L (ref 9–16)
AST SERPL-CCNC: 55 U/L (ref 10–35)
BILIRUB SERPL-MCNC: 0.4 MG/DL (ref 0–1.2)
BUN SERPL-MCNC: 8 MG/DL (ref 8–23)
CALCIUM SERPL-MCNC: 8.5 MG/DL (ref 8.6–10.4)
CHLORIDE SERPL-SCNC: 100 MMOL/L (ref 98–107)
CO2 SERPL-SCNC: 27 MMOL/L (ref 20–31)
CREAT SERPL-MCNC: 0.7 MG/DL (ref 0.5–0.9)
ERYTHROCYTE [DISTWIDTH] IN BLOOD BY AUTOMATED COUNT: 14.8 % (ref 11.8–14.4)
GFR SERPL CREATININE-BSD FRML MDRD: 87 ML/MIN/1.73M2
GLUCOSE SERPL-MCNC: 99 MG/DL (ref 74–99)
HCT VFR BLD AUTO: 25.5 % (ref 36.3–47.1)
HGB BLD-MCNC: 8 G/DL (ref 11.9–15.1)
MCH RBC QN AUTO: 30.9 PG (ref 25.2–33.5)
MCHC RBC AUTO-ENTMCNC: 31.4 G/DL (ref 28.4–34.8)
MCV RBC AUTO: 98.5 FL (ref 82.6–102.9)
NRBC BLD-RTO: 0 PER 100 WBC
PLATELET # BLD AUTO: 188 K/UL (ref 138–453)
PMV BLD AUTO: 9.1 FL (ref 8.1–13.5)
POTASSIUM SERPL-SCNC: 4 MMOL/L (ref 3.7–5.3)
PROT SERPL-MCNC: 5.2 G/DL (ref 6.6–8.7)
RBC # BLD AUTO: 2.59 M/UL (ref 3.95–5.11)
SODIUM SERPL-SCNC: 136 MMOL/L (ref 136–145)
WBC OTHER # BLD: 5.5 K/UL (ref 3.5–11.3)

## 2024-04-06 PROCEDURE — 80053 COMPREHEN METABOLIC PANEL: CPT

## 2024-04-06 PROCEDURE — 6370000000 HC RX 637 (ALT 250 FOR IP): Performed by: NURSE PRACTITIONER

## 2024-04-06 PROCEDURE — 6370000000 HC RX 637 (ALT 250 FOR IP)

## 2024-04-06 PROCEDURE — 6370000000 HC RX 637 (ALT 250 FOR IP): Performed by: PSYCHIATRY & NEUROLOGY

## 2024-04-06 PROCEDURE — 6360000002 HC RX W HCPCS

## 2024-04-06 PROCEDURE — 6370000000 HC RX 637 (ALT 250 FOR IP): Performed by: STUDENT IN AN ORGANIZED HEALTH CARE EDUCATION/TRAINING PROGRAM

## 2024-04-06 PROCEDURE — 85027 COMPLETE CBC AUTOMATED: CPT

## 2024-04-06 PROCEDURE — 2060000000 HC ICU INTERMEDIATE R&B

## 2024-04-06 PROCEDURE — 6370000000 HC RX 637 (ALT 250 FOR IP): Performed by: ORTHOPAEDIC SURGERY

## 2024-04-06 PROCEDURE — 6370000000 HC RX 637 (ALT 250 FOR IP): Performed by: HOSPITALIST

## 2024-04-06 PROCEDURE — 99232 SBSQ HOSP IP/OBS MODERATE 35: CPT | Performed by: HOSPITALIST

## 2024-04-06 PROCEDURE — 2580000003 HC RX 258: Performed by: PSYCHIATRY & NEUROLOGY

## 2024-04-06 PROCEDURE — 36415 COLL VENOUS BLD VENIPUNCTURE: CPT

## 2024-04-06 PROCEDURE — 6360000002 HC RX W HCPCS: Performed by: REGISTERED NURSE

## 2024-04-06 PROCEDURE — 6370000000 HC RX 637 (ALT 250 FOR IP): Performed by: INTERNAL MEDICINE

## 2024-04-06 RX ADMIN — OXYCODONE 10 MG: 5 TABLET ORAL at 21:03

## 2024-04-06 RX ADMIN — HYDROMORPHONE HYDROCHLORIDE 0.5 MG: 1 INJECTION, SOLUTION INTRAMUSCULAR; INTRAVENOUS; SUBCUTANEOUS at 08:35

## 2024-04-06 RX ADMIN — ACETAMINOPHEN 325MG 650 MG: 325 TABLET ORAL at 14:00

## 2024-04-06 RX ADMIN — HYDROMORPHONE HYDROCHLORIDE 0.5 MG: 1 INJECTION, SOLUTION INTRAMUSCULAR; INTRAVENOUS; SUBCUTANEOUS at 23:22

## 2024-04-06 RX ADMIN — ACETAMINOPHEN 325MG 650 MG: 325 TABLET ORAL at 21:03

## 2024-04-06 RX ADMIN — APIXABAN 10 MG: 5 TABLET, FILM COATED ORAL at 08:34

## 2024-04-06 RX ADMIN — Medication 3 MG: at 21:05

## 2024-04-06 RX ADMIN — SODIUM CHLORIDE, PRESERVATIVE FREE 10 ML: 5 INJECTION INTRAVENOUS at 21:09

## 2024-04-06 RX ADMIN — ANTACID TABLETS 500 MG: 500 TABLET, CHEWABLE ORAL at 21:06

## 2024-04-06 RX ADMIN — SENNOSIDES 8.8 MG: 8.8 LIQUID ORAL at 21:07

## 2024-04-06 RX ADMIN — SENNOSIDES 8.8 MG: 8.8 LIQUID ORAL at 08:34

## 2024-04-06 RX ADMIN — Medication 10 MG: at 16:07

## 2024-04-06 RX ADMIN — ACETAMINOPHEN 325MG 650 MG: 325 TABLET ORAL at 08:34

## 2024-04-06 RX ADMIN — APIXABAN 10 MG: 5 TABLET, FILM COATED ORAL at 21:21

## 2024-04-06 RX ADMIN — ROSUVASTATIN CALCIUM 40 MG: 20 TABLET, FILM COATED ORAL at 21:06

## 2024-04-06 RX ADMIN — ASPIRIN 81 MG: 81 TABLET, COATED ORAL at 08:34

## 2024-04-06 RX ADMIN — OXYCODONE 10 MG: 5 TABLET ORAL at 05:26

## 2024-04-06 RX ADMIN — ACETAMINOPHEN 325MG 650 MG: 325 TABLET ORAL at 03:54

## 2024-04-06 RX ADMIN — PANTOPRAZOLE SODIUM 40 MG: 40 TABLET, DELAYED RELEASE ORAL at 08:34

## 2024-04-06 RX ADMIN — Medication 10 MG: at 23:29

## 2024-04-06 RX ADMIN — HYDROCHLOROTHIAZIDE 25 MG: 25 TABLET ORAL at 08:34

## 2024-04-06 RX ADMIN — OXYCODONE 10 MG: 5 TABLET ORAL at 14:00

## 2024-04-06 RX ADMIN — LISINOPRIL 20 MG: 20 TABLET ORAL at 08:34

## 2024-04-06 ASSESSMENT — PAIN SCALES - GENERAL
PAINLEVEL_OUTOF10: 7
PAINLEVEL_OUTOF10: 10
PAINLEVEL_OUTOF10: 9
PAINLEVEL_OUTOF10: 5
PAINLEVEL_OUTOF10: 10
PAINLEVEL_OUTOF10: 8
PAINLEVEL_OUTOF10: 6

## 2024-04-06 ASSESSMENT — PAIN DESCRIPTION - FREQUENCY
FREQUENCY: INTERMITTENT
FREQUENCY: INTERMITTENT

## 2024-04-06 ASSESSMENT — PAIN - FUNCTIONAL ASSESSMENT
PAIN_FUNCTIONAL_ASSESSMENT: PREVENTS OR INTERFERES SOME ACTIVE ACTIVITIES AND ADLS
PAIN_FUNCTIONAL_ASSESSMENT: PREVENTS OR INTERFERES WITH ALL ACTIVE AND SOME PASSIVE ACTIVITIES
PAIN_FUNCTIONAL_ASSESSMENT: PREVENTS OR INTERFERES SOME ACTIVE ACTIVITIES AND ADLS
PAIN_FUNCTIONAL_ASSESSMENT: PREVENTS OR INTERFERES WITH MANY ACTIVE NOT PASSIVE ACTIVITIES

## 2024-04-06 ASSESSMENT — PAIN DESCRIPTION - DESCRIPTORS
DESCRIPTORS: BURNING;ACHING
DESCRIPTORS: BURNING
DESCRIPTORS: BURNING
DESCRIPTORS: DULL;JABBING

## 2024-04-06 ASSESSMENT — PAIN DESCRIPTION - LOCATION
LOCATION: BACK
LOCATION: ABDOMEN;BACK

## 2024-04-06 ASSESSMENT — PAIN DESCRIPTION - ORIENTATION
ORIENTATION: MID
ORIENTATION: POSTERIOR;MID;LOWER
ORIENTATION: POSTERIOR;MID;LOWER
ORIENTATION: MID

## 2024-04-06 ASSESSMENT — PAIN DESCRIPTION - PAIN TYPE
TYPE: SURGICAL PAIN;ACUTE PAIN
TYPE: SURGICAL PAIN;ACUTE PAIN

## 2024-04-06 NOTE — PROGRESS NOTES
[facility]  Office: [phone]  Enoch Hou DO, Irineo Pereira, DO, Teja Pedroza DO, Aleksandr Goff, DO, Arsenio Gary MD, Crys Schroeder MD, Michela Johnson MD, Ally Cordova MD,  Sathish Poon MD, Julia Smith MD, Fatimah Infante MD,  Venancio Silverman DO, Dony Kaur MD, Zach Liao MD, Zbigniew Hou DO, Lucrecia Lima MD,  Herminio Simon DO, Uzma Reyes MD, Mary Lane MD, Peace Benoit MD, Philip Perez MD,  Ismael Landa MD, Helen Ybarra MD, Mario Campuzano MD, Kaity Green MD, Ender Leslie MD, Prudencio Lewis MD, Abraham Levine DO, Saad Borjas DO, Celena Mancilla MD,  Facundo Vaughn MD, Shirley Waterhouse, CNP,  Janet Hills CNP, Bala Street, CNP,  Mariah Grissom, DNP, Delia Collins, CNP, Erinn Estrada, CNP, Reane White, CNP, Chayo Desai, CNP, Shannon Marks, PA-C, Janis Mercer PA-C, Anya Lassiter, CNP, Alka Gregorio, CNP, Héctor Judd, CNP, Day Philip, CNP, Luda Melgar, CNP, Kizzy Mcleod, CNS, Tess Bullard, CNP, Amy Gomez CNP, Tracy Schwab, CNP         [facility]   IN-PATIENT SERVICE   [facility]    Progress Note    4/6/2024    3:30 PM    Name:   Annie Beltran  MRN:     [id number]     Acct:      [id number]   Room:   0133/0133-01   Day:  8  Admit Date:  3/29/2024  9:25 AM    PCP:   Shankar Brown MD  Code Status:  Full Code    Subjective:     C/C: Left-sided weakness  Interval History Status: .   Still has mild left-sided weakness, denies any double vision  Endovascular neurology has been following the patient, currently patient is only on aspirin and heparin drip  Constipation is resolved with Dulcolax suppository  Jo's catheter was removed yesterday-voiding  Blood pressure running high-189/82, heart rate 78  Brief History:       Patient is a 79-year-old female s/p ALIF with PI L4-S1 and removal of spinal stimulator, DOS: 3/29/24 who was admitted under Ortho surgery   On 3/31/2024 she was  0.4     ABG:No results found for: \"POCPH\", \"PHART\", \"PH\", \"POCPCO2\", \"TMR1VEE\", \"PCO2\", \"POCPO2\", \"PO2ART\", \"PO2\", \"POCHCO3\", \"EPF1NQT\", \"HCO3\", \"NBEA\", \"PBEA\", \"BEART\", \"BE\", \"THGBART\", \"THB\", \"EAS8HXD\", \"THOL0WVQ\", \"J5VXUPRA\", \"O2SAT\", \"FIO2\"  Lab Results   Component Value Date/Time    SPECIAL NOT REPORTED 04/20/2012 09:33 AM     Lab Results   Component Value Date/Time    CULTURE ESCHERICHIA COLI >100,000 CFU/ML (A) 11/29/2023 12:01 PM       Radiology:  XR CHEST PORTABLE    Result Date: 4/2/2024  Airspace opacities involving the medial segment middle lobe may represent pneumonia or pulmonary infarct given history of middle lobe PE.     CT ABDOMEN PELVIS W IV CONTRAST Additional Contrast? None    Result Date: 4/1/2024  1. Choledocholithiasis with a 9 mm gallstone in the common bile duct inferiorly.  There is associated biliary dilatation with the common bile duct measuring up to 1 cm and there is distension of the gallbladder which is likely related.  A gallstone is also noted in the gallbladder. 2. 4.6 cm subcutaneous fluid collection on the right side of the back in the right flank region with overlying skin staples.  This does not have peripheral rim enhancement and is favored to represent a postoperative seroma. 3. No acute findings elsewhere in the abdomen or pelvis. 4. Anasarca with subcutaneous edema in the flanks and pelvis     CT CHEST PULMONARY EMBOLISM W CONTRAST    Result Date: 4/1/2024  Redemonstration of a pulmonary embolism within a branch supplying the right middle lobe.     CT LUMBAR SPINE WO CONTRAST    Result Date: 4/1/2024  1. No evidence of acute fracture or dislocation. 2. Multilevel degenerative disc disease and facet osteoarthritis as described above. There are findings of previous posterior spinal fusion from L2 to L4 level with bilateral posterolateral rods and transpedicular screws. 3. Mild central stenosis at L1-L2 level. 4. Neural foraminal stenosis at multiple levels as described

## 2024-04-06 NOTE — PLAN OF CARE
4/6/2024 0800)  Achieves maximal functionality and self care: Monitor swallowing and airway patency with patient fatigue and changes in neurological status     Problem: Cardiovascular - Adult  Goal: Maintains optimal cardiac output and hemodynamic stability  Outcome: Progressing  Goal: Absence of cardiac dysrhythmias or at baseline  Outcome: Progressing     Problem: Skin/Tissue Integrity - Adult  Goal: Skin integrity remains intact  Outcome: Progressing  Flowsheets (Taken 4/6/2024 1057)  Skin Integrity Remains Intact: Monitor for areas of redness and/or skin breakdown  Goal: Incisions, wounds, or drain sites healing without S/S of infection  Outcome: Progressing  Flowsheets (Taken 4/6/2024 1057)  Incisions, Wounds, or Drain Sites Healing Without Sign and Symptoms of Infection: ADMISSION and DAILY: Assess and document risk factors for pressure ulcer development  Goal: Oral mucous membranes remain intact  Outcome: Progressing  Flowsheets (Taken 4/6/2024 1057)  Oral Mucous Membranes Remain Intact: Assess oral mucosa and hygiene practices     Problem: Musculoskeletal - Adult  Goal: Return mobility to safest level of function  Outcome: Progressing  Goal: Maintain proper alignment of affected body part  Outcome: Progressing  Goal: Return ADL status to a safe level of function  Outcome: Progressing     Problem: Gastrointestinal - Adult  Goal: Minimal or absence of nausea and vomiting  Outcome: Progressing  Goal: Maintains or returns to baseline bowel function  Outcome: Progressing  Goal: Maintains adequate nutritional intake  Outcome: Progressing     Problem: Genitourinary - Adult  Goal: Absence of urinary retention  Outcome: Progressing     Problem: Infection - Adult  Goal: Absence of infection at discharge  Outcome: Progressing  Goal: Absence of infection during hospitalization  Outcome: Progressing  Goal: Absence of fever/infection during anticipated neutropenic period  Outcome: Progressing     Problem: Metabolic/Fluid  and Electrolytes - Adult  Goal: Electrolytes maintained within normal limits  Outcome: Progressing  Goal: Hemodynamic stability and optimal renal function maintained  Outcome: Progressing  Goal: Glucose maintained within prescribed range  Outcome: Progressing     Problem: Hematologic - Adult  Goal: Maintains hematologic stability  Outcome: Progressing     Problem: Nutrition Deficit:  Goal: Optimize nutritional status  Outcome: Progressing     Problem: Skin/Tissue Integrity  Goal: Absence of new skin breakdown  Description: 1.  Monitor for areas of redness and/or skin breakdown  2.  Assess vascular access sites hourly  3.  Every 4-6 hours minimum:  Change oxygen saturation probe site  4.  Every 4-6 hours:  If on nasal continuous positive airway pressure, respiratory therapy assess nares and determine need for appliance change or resting period.  Outcome: Progressing

## 2024-04-06 NOTE — PROGRESS NOTES
b/l  DTR: deferred  Gait: deferred    NIH Stroke Scale:   1a  Level of consciousness: 0 - alert; keenly responsive   1b. LOC questions:  0 - answers both questions correctly   1c. LOC commands: 0 - performs both tasks correctly   2.  Best Gaze: 0 - normal   3. Visual: 0 - no visual loss   4. Facial Palsy: 2 - partial paralysis (total or near total paralysis of the lower face)   5a. Motor left arm: 0 - no drift, limb holds 90 (or 45) degrees for full 10 seconds   5b.  Motor right arm: 0 - no drift, limb holds 90 (or 45) degrees for full 10 seconds   6a. Motor left le - no drift; leg holds 30 degree position for full 5 seconds   6b  Motor right le - no drift; leg holds 30 degree position for full 5 seconds   7. Limb Ataxia: 0 - absent   8.  Sensory: 0 - normal; no sensory loss   9. Best Language:  0 - no aphasia, normal   10. Dysarthria: 0 - normal   11. Extinction and Inattention: 0 - no abnormality         Total:   2 (baseline from Bell palsy of right face for 40 years)     MRS: 0      LABS:   Reviewed.  Lab Results   Component Value Date    HGB 8.4 (L) 2024    WBC 5.2 2024     2024     2024    BUN 7 (L) 2024    CREATININE 0.7 2024    AST 58 (H) 2024    ALT 29 2024    MG 2.2 2024    APTT 22.0 (L) 2024    INR 0.9 2024      Lab Results   Component Value Date/Time    COVID19 Not Detected 2021 04:52 PM       RADIOLOGY:   Images were personally reviewed including:    CTA head neck 3/31/2024: R CCA occlusion      MRI brain 3/31/2024: acute right parietal stroke      ASSESSMENT:     79 year old woman with Bell palsy with right face droop for 40 years, HLD, HTN, came in here for L4 S1 anterior lumbar fusion and removal of spinal cord stimulator on the 3/29/2024.     On the 3/31/2024, patient was noted to have left face droop. 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 MRI showed an acute right parietal stroke.  Repeat

## 2024-04-06 NOTE — PROGRESS NOTES
Endovascular Neurosurgery Progress Note      Reason for evaluation: R CCA stenosis    SUBJECTIVE:   NAEO.        Systolic (24hrs), Av , Min:137 , Max:194         History of Chief Complaint:      79 y.o. female with right-sided Bell's palsy for 40 years, presbycusis, hyperlipidemia, hypertension, and chronic history of low back pain with remote lumbar surgery in the 1970s status post revision, status post DCM stimulator was admitted on 3/29/2024 for an anterior lumbar interbody fusion at L4 S1 as well as removal of her spinal cord stimulator.      On postop day 2.  At approximately 12 PM on 3/31/2024, patient's nurse noticed a new onset left facial droop.  Stroke alert was called at approximately 12:50 PM.  Upon arrival, patient's  and glucose 176.  On examination, patient had horizontal nystagmus as well as left upper extremity dysmetria with finger-nose-finger testing.  No obvious weakness was appreciated on examination.  No other focal deficits besides a chronic right facial weakness secondary to a remote right Bell's palsy.     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  MRI showed an acute right parietal stroke    Allergies  is allergic to ciprofloxacin.  Medications  Prior to Admission medications    Medication Sig Start Date End Date Taking? Authorizing Provider   loratadine (CLARITIN) 10 MG capsule Take 1 capsule by mouth daily    Geronimo Brower MD   lisinopril-hydroCHLOROthiazide (PRINZIDE;ZESTORETIC) 20-25 MG per tablet Take 1 tablet by mouth daily    Geronimo Brower MD   HYDROcodone-acetaminophen (NORCO) 5-325 MG per tablet Take 1 tablet by mouth nightly. Max Daily Amount: 1 tablet    Geronimo Brower MD   diphenhydrAMINE-APAP, sleep, (ACETAMINOPHEN PM)  MG tablet Take 2 tablets by mouth nightly as needed for Sleep    Geronimo Brower MD   Ketotifen Fumarate (ALLERGY EYE DROPS OP) Apply 1 drop to eye    2.  Best Gaze: 0 - normal   3. Visual: 0 - no visual loss   4. Facial Palsy: 2 - partial paralysis (total or near total paralysis of the lower face)   5a. Motor left arm: 0 - no drift, limb holds 90 (or 45) degrees for full 10 seconds   5b.  Motor right arm: 0 - no drift, limb holds 90 (or 45) degrees for full 10 seconds   6a. Motor left le - no drift; leg holds 30 degree position for full 5 seconds   6b  Motor right le - no drift; leg holds 30 degree position for full 5 seconds   7. Limb Ataxia: 0 - absent   8.  Sensory: 0 - normal; no sensory loss   9. Best Language:  0 - no aphasia, normal   10. Dysarthria: 0 - normal   11. Extinction and Inattention: 0 - no abnormality         Total:   2 (baseline from Bell palsy of right face for 40 years)     MRS: 0      LABS:   Reviewed.  Lab Results   Component Value Date    HGB 8.0 (L) 2024    WBC 5.5 2024     2024     2024    BUN 8 2024    CREATININE 0.7 2024    AST 55 (H) 2024    ALT 27 2024    MG 2.2 2024    APTT 22.0 (L) 2024    INR 0.9 2024      Lab Results   Component Value Date/Time    COVID19 Not Detected 2021 04:52 PM       RADIOLOGY:   Images were personally reviewed including:    CTA head neck 3/31/2024: R CCA occlusion      MRI brain 3/31/2024: acute right parietal stroke      ASSESSMENT:     79 year old woman with Bell palsy with right face droop for 40 years, HLD, HTN, came in here for L4 S1 anterior lumbar fusion and removal of spinal cord stimulator on the 3/29/2024.     On the 3/31/2024, patient was noted to have left face droop. 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 MRI showed an acute right parietal stroke.  Repeat CT PE confirms the presence of PE    PLAN:     - Agree with baby aspirin and anticoagulation for PE    -Normalize blood pressure gradually  - no endovascular intervention at this time        Patient to Dr Oneill in the clinic in 2-3

## 2024-04-07 PROBLEM — I63.9 ACUTE STROKE DUE TO ISCHEMIA (HCC): Status: ACTIVE | Noted: 2024-04-05

## 2024-04-07 LAB
ALBUMIN SERPL-MCNC: 3.3 G/DL (ref 3.5–5.2)
ALBUMIN/GLOB SERPL: 2 {RATIO} (ref 1–2.5)
ALP SERPL-CCNC: 269 U/L (ref 35–104)
ALT SERPL-CCNC: 24 U/L (ref 10–35)
ANION GAP SERPL CALCULATED.3IONS-SCNC: 8 MMOL/L (ref 9–16)
AST SERPL-CCNC: 43 U/L (ref 10–35)
BILIRUB SERPL-MCNC: 0.4 MG/DL (ref 0–1.2)
BUN SERPL-MCNC: 11 MG/DL (ref 8–23)
CALCIUM SERPL-MCNC: 8.8 MG/DL (ref 8.6–10.4)
CHLORIDE SERPL-SCNC: 98 MMOL/L (ref 98–107)
CO2 SERPL-SCNC: 29 MMOL/L (ref 20–31)
CREAT SERPL-MCNC: 0.8 MG/DL (ref 0.5–0.9)
GFR SERPL CREATININE-BSD FRML MDRD: 70 ML/MIN/1.73M2
GLUCOSE SERPL-MCNC: 108 MG/DL (ref 74–99)
POTASSIUM SERPL-SCNC: 4 MMOL/L (ref 3.7–5.3)
PROT SERPL-MCNC: 5.5 G/DL (ref 6.6–8.7)
SODIUM SERPL-SCNC: 135 MMOL/L (ref 136–145)

## 2024-04-07 PROCEDURE — 6360000002 HC RX W HCPCS

## 2024-04-07 PROCEDURE — 6370000000 HC RX 637 (ALT 250 FOR IP): Performed by: INTERNAL MEDICINE

## 2024-04-07 PROCEDURE — 6370000000 HC RX 637 (ALT 250 FOR IP): Performed by: HOSPITALIST

## 2024-04-07 PROCEDURE — 2580000003 HC RX 258: Performed by: PSYCHIATRY & NEUROLOGY

## 2024-04-07 PROCEDURE — 99232 SBSQ HOSP IP/OBS MODERATE 35: CPT | Performed by: FAMILY MEDICINE

## 2024-04-07 PROCEDURE — 2060000000 HC ICU INTERMEDIATE R&B

## 2024-04-07 PROCEDURE — 6370000000 HC RX 637 (ALT 250 FOR IP)

## 2024-04-07 PROCEDURE — 6370000000 HC RX 637 (ALT 250 FOR IP): Performed by: STUDENT IN AN ORGANIZED HEALTH CARE EDUCATION/TRAINING PROGRAM

## 2024-04-07 PROCEDURE — 6370000000 HC RX 637 (ALT 250 FOR IP): Performed by: NURSE PRACTITIONER

## 2024-04-07 PROCEDURE — 6370000000 HC RX 637 (ALT 250 FOR IP): Performed by: PSYCHIATRY & NEUROLOGY

## 2024-04-07 PROCEDURE — 80053 COMPREHEN METABOLIC PANEL: CPT

## 2024-04-07 PROCEDURE — 6370000000 HC RX 637 (ALT 250 FOR IP): Performed by: ORTHOPAEDIC SURGERY

## 2024-04-07 PROCEDURE — 36415 COLL VENOUS BLD VENIPUNCTURE: CPT

## 2024-04-07 RX ADMIN — SODIUM CHLORIDE, PRESERVATIVE FREE 10 ML: 5 INJECTION INTRAVENOUS at 08:24

## 2024-04-07 RX ADMIN — OXYCODONE 10 MG: 5 TABLET ORAL at 20:29

## 2024-04-07 RX ADMIN — ACETAMINOPHEN 325MG 650 MG: 325 TABLET ORAL at 16:00

## 2024-04-07 RX ADMIN — ACETAMINOPHEN 325MG 650 MG: 325 TABLET ORAL at 20:27

## 2024-04-07 RX ADMIN — ROSUVASTATIN CALCIUM 40 MG: 20 TABLET, FILM COATED ORAL at 20:27

## 2024-04-07 RX ADMIN — ASPIRIN 81 MG: 81 TABLET, COATED ORAL at 08:21

## 2024-04-07 RX ADMIN — HYDROMORPHONE HYDROCHLORIDE 0.5 MG: 1 INJECTION, SOLUTION INTRAMUSCULAR; INTRAVENOUS; SUBCUTANEOUS at 15:59

## 2024-04-07 RX ADMIN — TIZANIDINE 2 MG: 2 TABLET ORAL at 02:32

## 2024-04-07 RX ADMIN — SENNOSIDES 8.8 MG: 8.8 LIQUID ORAL at 08:21

## 2024-04-07 RX ADMIN — HYDROMORPHONE HYDROCHLORIDE 0.5 MG: 1 INJECTION, SOLUTION INTRAMUSCULAR; INTRAVENOUS; SUBCUTANEOUS at 12:31

## 2024-04-07 RX ADMIN — HYDROCHLOROTHIAZIDE 25 MG: 25 TABLET ORAL at 08:21

## 2024-04-07 RX ADMIN — SODIUM CHLORIDE, PRESERVATIVE FREE 10 ML: 5 INJECTION INTRAVENOUS at 20:29

## 2024-04-07 RX ADMIN — APIXABAN 10 MG: 5 TABLET, FILM COATED ORAL at 08:21

## 2024-04-07 RX ADMIN — LISINOPRIL 20 MG: 20 TABLET ORAL at 08:21

## 2024-04-07 RX ADMIN — ANTACID TABLETS 500 MG: 500 TABLET, CHEWABLE ORAL at 08:21

## 2024-04-07 RX ADMIN — APIXABAN 10 MG: 5 TABLET, FILM COATED ORAL at 20:28

## 2024-04-07 RX ADMIN — ACETAMINOPHEN 325MG 650 MG: 325 TABLET ORAL at 08:21

## 2024-04-07 RX ADMIN — PANTOPRAZOLE SODIUM 40 MG: 40 TABLET, DELAYED RELEASE ORAL at 08:21

## 2024-04-07 RX ADMIN — Medication 3 MG: at 20:28

## 2024-04-07 RX ADMIN — TIZANIDINE 2 MG: 2 TABLET ORAL at 21:34

## 2024-04-07 RX ADMIN — OXYCODONE 10 MG: 5 TABLET ORAL at 09:51

## 2024-04-07 RX ADMIN — HYDROMORPHONE HYDROCHLORIDE 0.5 MG: 1 INJECTION, SOLUTION INTRAMUSCULAR; INTRAVENOUS; SUBCUTANEOUS at 22:32

## 2024-04-07 RX ADMIN — OXYCODONE 10 MG: 5 TABLET ORAL at 02:30

## 2024-04-07 ASSESSMENT — PAIN DESCRIPTION - ORIENTATION
ORIENTATION: MID
ORIENTATION: MID;LOWER
ORIENTATION: MID
ORIENTATION: LOWER

## 2024-04-07 ASSESSMENT — PAIN DESCRIPTION - ONSET
ONSET: PROGRESSIVE
ONSET: ON-GOING

## 2024-04-07 ASSESSMENT — PAIN SCALES - GENERAL
PAINLEVEL_OUTOF10: 4
PAINLEVEL_OUTOF10: 5
PAINLEVEL_OUTOF10: 8
PAINLEVEL_OUTOF10: 0
PAINLEVEL_OUTOF10: 4
PAINLEVEL_OUTOF10: 8
PAINLEVEL_OUTOF10: 4
PAINLEVEL_OUTOF10: 8
PAINLEVEL_OUTOF10: 4
PAINLEVEL_OUTOF10: 8
PAINLEVEL_OUTOF10: 8

## 2024-04-07 ASSESSMENT — PAIN DESCRIPTION - DESCRIPTORS
DESCRIPTORS: ACHING
DESCRIPTORS: BURNING;ACHING;CRAMPING;DISCOMFORT
DESCRIPTORS: ACHING;BURNING;CRAMPING
DESCRIPTORS: ACHING;GNAWING
DESCRIPTORS: ACHING;SHARP
DESCRIPTORS: DISCOMFORT;CRAMPING;CRUSHING

## 2024-04-07 ASSESSMENT — PAIN DESCRIPTION - LOCATION
LOCATION: BACK
LOCATION: ABDOMEN;BACK
LOCATION: BACK
LOCATION: ABDOMEN;BACK
LOCATION: BACK
LOCATION: BACK

## 2024-04-07 ASSESSMENT — PAIN - FUNCTIONAL ASSESSMENT
PAIN_FUNCTIONAL_ASSESSMENT: PREVENTS OR INTERFERES SOME ACTIVE ACTIVITIES AND ADLS

## 2024-04-07 ASSESSMENT — PAIN DESCRIPTION - PAIN TYPE
TYPE: ACUTE PAIN;SURGICAL PAIN
TYPE: ACUTE PAIN;SURGICAL PAIN

## 2024-04-07 ASSESSMENT — PAIN SCALES - WONG BAKER: WONGBAKER_NUMERICALRESPONSE: NO HURT

## 2024-04-07 ASSESSMENT — PAIN DESCRIPTION - FREQUENCY
FREQUENCY: INTERMITTENT
FREQUENCY: INTERMITTENT

## 2024-04-07 NOTE — PROGRESS NOTES
[facility]  PROGRESS NOTE    Shift date: 4/6/2024  Shift day: Saturday   Shift # 2    Room # 0133/0133-01   Name: Annie Beltran                Restorationist:  Scientologist  [facility] of Christian:  [facility]    Referral: Routine Visit    Admit Date & Time: 3/29/2024  9:25 AM    Assessment:  Annie Beltran is a 79 y.o. female in the hospital. Patient appeared coping and hopeful when conversing with .    Intervention:  Writer introduced self and title as . Patient did not appear to mind  presence and engaged in conversation. Patient discussed a recent procedure and it's impact. 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 Patient appeared hopeful of going to rehab soon.  provided a supportive presence through active listening and words of affirmation.    Outcome:  Patient appeared receptive to listening presence to talk through health and concerns.    Plan:  Chaplains will remain available to offer spiritual and emotional support as needed.      Electronically signed by Chaplain Dinorah, on 4/6/2024 at 8:35 PM.  [facility]  [facility]  [phone]

## 2024-04-07 NOTE — PLAN OF CARE
Problem: Discharge Planning  Goal: Discharge to home or other facility with appropriate resources  Outcome: Progressing  Flowsheets  Taken 4/7/2024 1600  Discharge to home or other facility with appropriate resources: Identify barriers to discharge with patient and caregiver  Taken 4/7/2024 0800  Discharge to home or other facility with appropriate resources: Identify barriers to discharge with patient and caregiver     Problem: Safety - Adult  Goal: Free from fall injury  4/7/2024 1836 by Chantale Verde RN  Outcome: Progressing  Flowsheets (Taken 4/7/2024 1151)  Free From Fall Injury: Instruct family/caregiver on patient safety  4/7/2024 0515 by Amanda Kendall RN  Outcome: Progressing  Flowsheets (Taken 4/6/2024 2000)  Free From Fall Injury: Instruct family/caregiver on patient safety     Problem: Pain  Goal: Verbalizes/displays adequate comfort level or baseline comfort level  4/7/2024 1836 by Chantale Verde RN  Outcome: Progressing  Flowsheets (Taken 4/6/2024 2322 by Amanda Kendall RN)  Verbalizes/displays adequate comfort level or baseline comfort level: Encourage patient to monitor pain and request assistance  4/7/2024 0515 by Amanda Kendall RN  Outcome: Progressing  Flowsheets (Taken 4/6/2024 2322)  Verbalizes/displays adequate comfort level or baseline comfort level: Encourage patient to monitor pain and request assistance     Problem: ABCDS Injury Assessment  Goal: Absence of physical injury  Outcome: Progressing  Flowsheets (Taken 4/7/2024 1151)  Absence of Physical Injury: Implement safety measures based on patient assessment     Problem: Respiratory - Adult  Goal: Achieves optimal ventilation and oxygenation  Outcome: Progressing  Flowsheets (Taken 4/7/2024 0800)  Achieves optimal ventilation and oxygenation: Assess for changes in respiratory status     Problem: Neurosensory - Adult  Goal: Achieves stable or improved neurological status  Outcome: Progressing  Flowsheets (Taken  1151  Oral Mucous Membranes Remain Intact: Assess oral mucosa and hygiene practices  Taken 4/7/2024 0800  Oral Mucous Membranes Remain Intact: Assess oral mucosa and hygiene practices     Problem: Musculoskeletal - Adult  Goal: Return mobility to safest level of function  Outcome: Progressing  Flowsheets (Taken 4/7/2024 0800)  Return Mobility to Safest Level of Function: Assess patient stability and activity tolerance for standing, transferring and ambulating with or without assistive devices  Goal: Maintain proper alignment of affected body part  Outcome: Progressing  Goal: Return ADL status to a safe level of function  Outcome: Progressing     Problem: Gastrointestinal - Adult  Goal: Minimal or absence of nausea and vomiting  Outcome: Progressing  Goal: Maintains or returns to baseline bowel function  Outcome: Progressing  Goal: Maintains adequate nutritional intake  Outcome: Progressing     Problem: Genitourinary - Adult  Goal: Absence of urinary retention  Outcome: Progressing     Problem: Infection - Adult  Goal: Absence of infection at discharge  Outcome: Progressing  Flowsheets  Taken 4/7/2024 1600  Absence of infection at discharge: Assess and monitor for signs and symptoms of infection  Taken 4/7/2024 0800  Absence of infection at discharge: Assess and monitor for signs and symptoms of infection  Goal: Absence of infection during hospitalization  Outcome: Progressing  Goal: Absence of fever/infection during anticipated neutropenic period  Outcome: Progressing     Problem: Metabolic/Fluid and Electrolytes - Adult  Goal: Electrolytes maintained within normal limits  Outcome: Progressing  Flowsheets  Taken 4/7/2024 1600  Electrolytes maintained within normal limits: Monitor labs and assess patient for signs and symptoms of electrolyte imbalances  Taken 4/7/2024 0800  Electrolytes maintained within normal limits: Monitor labs and assess patient for signs and symptoms of electrolyte imbalances  Goal: Hemodynamic

## 2024-04-07 NOTE — PROGRESS NOTES
[facility]  Office: [phone]  Enoch Hou DO, Irineo Pereira DO, Teja Pedroza DO, Aleksandr Goff DO, Arsenio Gary MD, Crys Schroeder MD, Micheal Johnson MD, Ally Cordova MD,  Sathish Poon MD, Julia Smith MD, Fatimah Infante MD,  Venancio Silverman DO, Dony Kaur MD, Zach Liao MD, Zbigniew Hou DO, Lucrecia Lima MD,  Herminio Simon DO, Uzma Reyes MD, Mary Lane MD, Peace Benoit MD, Philip Perez MD,  Ismael Landa MD, Helen Ybarra MD, Mario Campuzano MD, Kaity Green MD, Ender Leslie MD, Prudencio Lewis MD, Abraham Levine DO, Saad Borjas DO, Celena Mancilla MD,  Facundo Vaughn MD, Shirley Waterhouse, CNP,  Janet Hills CNP, Bala Street, CNP,  Mariah Grissom, DNP, Delia Collins, CNP, Erinn Estrada, CNP, Renae White, CNP, Chayo Desai, CNP, Shannon Marks, PA-C, Janis Mercer PA-C, Anya Lassiter, CNP, Alka Gregorio, CNP, Héctor Judd, CNP, Day Philip, CNP, Luda Melgar, CNP, Kizzy Mcleod, CNS, Tess Bullard, CNP, Amy Gomez CNP, Tracy Schwab, CNP         [facility]   IN-PATIENT SERVICE   [facility]    Progress Note    4/7/2024    7:16 AM    Name:   Annie Beltran  MRN:     [id number]     Acct:      [id number]   Room:   0133/0133-01   Day:  9  Admit Date:  3/29/2024  9:25 AM    PCP:   Shankar Brown MD  Code Status:  Full Code    Subjective:     C/C: L sided weakness    Interval History Status: improved.     Patient seen and examined at bedside, no acute events overnight.    She is doing fine overall, having some back pain.  She is still on 2 L nasal cannula  She does work with PT/OT   Patient vitals, labs and all providers notes were reviewed,from overnight shift and morning updates were noted and discussed with the nurse     Brief History:     Per chart  Patient is a 79-year-old female s/p ALIF with PI L4-S1 and removal of spinal stimulator, DOS: 3/29/24 who was admitted under Ortho surgery

## 2024-04-07 NOTE — PROGRESS NOTES
Endovascular Neurosurgery Progress Note      Reason for evaluation: R CCA stenosis    SUBJECTIVE:   NAEO.        Systolic (24hrs), Av , Min:117 , Max:189         History of Chief Complaint:      79 y.o. female with right-sided Bell's palsy for 40 years, presbycusis, hyperlipidemia, hypertension, and chronic history of low back pain with remote lumbar surgery in the 1970s status post revision, status post DCM stimulator was admitted on 3/29/2024 for an anterior lumbar interbody fusion at L4 S1 as well as removal of her spinal cord stimulator.      On postop day 2.  At approximately 12 PM on 3/31/2024, patient's nurse noticed a new onset left facial droop.  Stroke alert was called at approximately 12:50 PM.  Upon arrival, patient's  and glucose 176.  On examination, patient had horizontal nystagmus as well as left upper extremity dysmetria with finger-nose-finger testing.  No obvious weakness was appreciated on examination.  No other focal deficits besides a chronic right facial weakness secondary to a remote right Bell's palsy.     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  MRI showed an acute right parietal stroke    Allergies  is allergic to ciprofloxacin.  Medications  Prior to Admission medications    Medication Sig Start Date End Date Taking? Authorizing Provider   loratadine (CLARITIN) 10 MG capsule Take 1 capsule by mouth daily    Geronimo Brower MD   lisinopril-hydroCHLOROthiazide (PRINZIDE;ZESTORETIC) 20-25 MG per tablet Take 1 tablet by mouth daily    Geronimo Brower MD   HYDROcodone-acetaminophen (NORCO) 5-325 MG per tablet Take 1 tablet by mouth nightly. Max Daily Amount: 1 tablet    Geronimo Brower MD   diphenhydrAMINE-APAP, sleep, (ACETAMINOPHEN PM)  MG tablet Take 2 tablets by mouth nightly as needed for Sleep    Geronimo Brower MD   Ketotifen Fumarate (ALLERGY EYE DROPS OP) Apply 1 drop to eye

## 2024-04-07 NOTE — PLAN OF CARE
Problem: Safety - Adult  Goal: Free from fall injury  4/7/2024 0515 by Amanda Kendall RN  Outcome: Progressing  Flowsheets (Taken 4/6/2024 2000)  Free From Fall Injury: Instruct family/caregiver on patient safety  4/6/2024 1835 by Chantale Verde, RN  Outcome: Progressing  Flowsheets (Taken 4/5/2024 2209 by Lainey Diaz, RN)  Free From Fall Injury: Instruct family/caregiver on patient safety     Problem: Pain  Goal: Verbalizes/displays adequate comfort level or baseline comfort level  4/7/2024 0515 by Amanda Kendall RN  Outcome: Progressing  Flowsheets (Taken 4/6/2024 2322)  Verbalizes/displays adequate comfort level or baseline comfort level: Encourage patient to monitor pain and request assistance  4/6/2024 1835 by Chantale Verde, RN  Outcome: Progressing  Flowsheets (Taken 4/4/2024 2015 by Trudy Sharma, RN)  Verbalizes/displays adequate comfort level or baseline comfort level:   Encourage patient to monitor pain and request assistance   Assess pain using appropriate pain scale

## 2024-04-08 LAB
ERYTHROCYTE [DISTWIDTH] IN BLOOD BY AUTOMATED COUNT: 14.6 % (ref 11.8–14.4)
HCT VFR BLD AUTO: 24 % (ref 36.3–47.1)
HGB BLD-MCNC: 7.5 G/DL (ref 11.9–15.1)
MCH RBC QN AUTO: 30.6 PG (ref 25.2–33.5)
MCHC RBC AUTO-ENTMCNC: 31.3 G/DL (ref 28.4–34.8)
MCV RBC AUTO: 98 FL (ref 82.6–102.9)
NRBC BLD-RTO: 0 PER 100 WBC
PLATELET # BLD AUTO: 258 K/UL (ref 138–453)
PMV BLD AUTO: 9.1 FL (ref 8.1–13.5)
RBC # BLD AUTO: 2.45 M/UL (ref 3.95–5.11)
WBC OTHER # BLD: 6.4 K/UL (ref 3.5–11.3)

## 2024-04-08 PROCEDURE — 6370000000 HC RX 637 (ALT 250 FOR IP): Performed by: ORTHOPAEDIC SURGERY

## 2024-04-08 PROCEDURE — 6370000000 HC RX 637 (ALT 250 FOR IP): Performed by: HOSPITALIST

## 2024-04-08 PROCEDURE — 97129 THER IVNTJ 1ST 15 MIN: CPT

## 2024-04-08 PROCEDURE — 6370000000 HC RX 637 (ALT 250 FOR IP): Performed by: NURSE PRACTITIONER

## 2024-04-08 PROCEDURE — 6370000000 HC RX 637 (ALT 250 FOR IP): Performed by: INTERNAL MEDICINE

## 2024-04-08 PROCEDURE — 6370000000 HC RX 637 (ALT 250 FOR IP)

## 2024-04-08 PROCEDURE — 99239 HOSP IP/OBS DSCHRG MGMT >30: CPT | Performed by: FAMILY MEDICINE

## 2024-04-08 PROCEDURE — 36415 COLL VENOUS BLD VENIPUNCTURE: CPT

## 2024-04-08 PROCEDURE — 85027 COMPLETE CBC AUTOMATED: CPT

## 2024-04-08 PROCEDURE — 97110 THERAPEUTIC EXERCISES: CPT

## 2024-04-08 PROCEDURE — 2060000000 HC ICU INTERMEDIATE R&B

## 2024-04-08 PROCEDURE — 6370000000 HC RX 637 (ALT 250 FOR IP): Performed by: PSYCHIATRY & NEUROLOGY

## 2024-04-08 PROCEDURE — 97535 SELF CARE MNGMENT TRAINING: CPT

## 2024-04-08 PROCEDURE — 2580000003 HC RX 258: Performed by: PSYCHIATRY & NEUROLOGY

## 2024-04-08 PROCEDURE — 6360000002 HC RX W HCPCS

## 2024-04-08 PROCEDURE — 6370000000 HC RX 637 (ALT 250 FOR IP): Performed by: STUDENT IN AN ORGANIZED HEALTH CARE EDUCATION/TRAINING PROGRAM

## 2024-04-08 PROCEDURE — 97116 GAIT TRAINING THERAPY: CPT

## 2024-04-08 RX ORDER — LANOLIN ALCOHOL/MO/W.PET/CERES
325 CREAM (GRAM) TOPICAL
Qty: 90 TABLET | Refills: 3 | Status: SHIPPED | OUTPATIENT
Start: 2024-04-09

## 2024-04-08 RX ORDER — SENNOSIDES 8.8 MG/5ML
5 LIQUID ORAL 2 TIMES DAILY
Qty: 105 ML | Refills: 0 | Status: SHIPPED | OUTPATIENT
Start: 2024-04-08

## 2024-04-08 RX ORDER — ROSUVASTATIN CALCIUM 40 MG/1
40 TABLET, COATED ORAL NIGHTLY
Qty: 30 TABLET | Refills: 3 | Status: SHIPPED | OUTPATIENT
Start: 2024-04-08

## 2024-04-08 RX ORDER — ASPIRIN 81 MG/1
81 TABLET ORAL DAILY
Qty: 30 TABLET | Refills: 3 | Status: SHIPPED | OUTPATIENT
Start: 2024-04-09

## 2024-04-08 RX ORDER — TIZANIDINE 2 MG/1
2 TABLET ORAL EVERY 8 HOURS PRN
Qty: 6 TABLET | DISCHARGE
Start: 2024-04-08 | End: 2024-04-10

## 2024-04-08 RX ORDER — LANOLIN ALCOHOL/MO/W.PET/CERES
325 CREAM (GRAM) TOPICAL
Status: DISCONTINUED | OUTPATIENT
Start: 2024-04-09 | End: 2024-04-09 | Stop reason: HOSPADM

## 2024-04-08 RX ADMIN — APIXABAN 5 MG: 5 TABLET, FILM COATED ORAL at 09:09

## 2024-04-08 RX ADMIN — APIXABAN 5 MG: 5 TABLET, FILM COATED ORAL at 20:23

## 2024-04-08 RX ADMIN — ROSUVASTATIN CALCIUM 40 MG: 20 TABLET, FILM COATED ORAL at 20:23

## 2024-04-08 RX ADMIN — OXYCODONE 10 MG: 5 TABLET ORAL at 20:23

## 2024-04-08 RX ADMIN — PANTOPRAZOLE SODIUM 40 MG: 40 TABLET, DELAYED RELEASE ORAL at 09:09

## 2024-04-08 RX ADMIN — OXYCODONE 10 MG: 5 TABLET ORAL at 09:08

## 2024-04-08 RX ADMIN — TIZANIDINE 2 MG: 2 TABLET ORAL at 17:21

## 2024-04-08 RX ADMIN — BISACODYL 10 MG: 10 SUPPOSITORY RECTAL at 21:59

## 2024-04-08 RX ADMIN — HYDROMORPHONE HYDROCHLORIDE 0.5 MG: 1 INJECTION, SOLUTION INTRAMUSCULAR; INTRAVENOUS; SUBCUTANEOUS at 12:36

## 2024-04-08 RX ADMIN — ASPIRIN 81 MG: 81 TABLET, COATED ORAL at 09:09

## 2024-04-08 RX ADMIN — ACETAMINOPHEN 325MG 650 MG: 325 TABLET ORAL at 20:22

## 2024-04-08 RX ADMIN — TIZANIDINE 2 MG: 2 TABLET ORAL at 23:50

## 2024-04-08 RX ADMIN — HYDROCHLOROTHIAZIDE 25 MG: 25 TABLET ORAL at 09:09

## 2024-04-08 RX ADMIN — ACETAMINOPHEN 325MG 650 MG: 325 TABLET ORAL at 09:09

## 2024-04-08 RX ADMIN — LISINOPRIL 20 MG: 20 TABLET ORAL at 09:09

## 2024-04-08 RX ADMIN — Medication 3 MG: at 20:23

## 2024-04-08 RX ADMIN — SENNOSIDES 8.8 MG: 8.8 LIQUID ORAL at 09:09

## 2024-04-08 RX ADMIN — OXYCODONE 10 MG: 5 TABLET ORAL at 05:14

## 2024-04-08 RX ADMIN — SODIUM CHLORIDE, PRESERVATIVE FREE 10 ML: 5 INJECTION INTRAVENOUS at 20:22

## 2024-04-08 ASSESSMENT — PAIN DESCRIPTION - LOCATION
LOCATION: BACK
LOCATION: LEG
LOCATION: BACK

## 2024-04-08 ASSESSMENT — PAIN DESCRIPTION - DIRECTION
RADIATING_TOWARDS: NO RADIATION

## 2024-04-08 ASSESSMENT — PAIN DESCRIPTION - PAIN TYPE
TYPE: ACUTE PAIN;SURGICAL PAIN
TYPE: ACUTE PAIN;SURGICAL PAIN
TYPE: ACUTE PAIN
TYPE: ACUTE PAIN;SURGICAL PAIN

## 2024-04-08 ASSESSMENT — PAIN DESCRIPTION - DESCRIPTORS
DESCRIPTORS: ACHING
DESCRIPTORS: BURNING
DESCRIPTORS: BURNING
DESCRIPTORS: CRAMPING
DESCRIPTORS: BURNING

## 2024-04-08 ASSESSMENT — PAIN DESCRIPTION - ONSET
ONSET: ON-GOING

## 2024-04-08 ASSESSMENT — PAIN DESCRIPTION - FREQUENCY
FREQUENCY: INTERMITTENT

## 2024-04-08 ASSESSMENT — PAIN DESCRIPTION - ORIENTATION
ORIENTATION: MID
ORIENTATION: MID
ORIENTATION: LEFT
ORIENTATION: MID

## 2024-04-08 ASSESSMENT — PAIN SCALES - GENERAL
PAINLEVEL_OUTOF10: 8
PAINLEVEL_OUTOF10: 5
PAINLEVEL_OUTOF10: 8
PAINLEVEL_OUTOF10: 8
PAINLEVEL_OUTOF10: 7

## 2024-04-08 NOTE — PROGRESS NOTES
[facility]  Office: [phone]  Enoch Hou DO, Irineo Pereira DO, Teja Pedroza DO, Aleksandr Goff DO, Arsenio Gary MD, Crys Schroeder MD, Micheal Johnson MD, Ally Cordova MD,  Sathish Poon MD, Julia Smith MD, Fatimah Infante MD,  Venancio Silverman DO, Dony Kaur MD, Zach Liao MD, Zbigniew Hou DO, Lucrecia Lima MD,  Herminio Simon DO, Uzma Reyes MD, Mary Lane MD, Peace Benoit MD, Philip Perez MD,  Ismael Landa MD, Helen Ybarra MD, Mario Campuzano MD, Kaity Green MD, Ender Leslie MD, Prudencio Lewis MD, Abraham Levine DO, Saad Borjas DO, Celena Mancilla MD,  Facundo Vaughn MD, Shirley Waterhouse, CNP,  Janet Hills CNP, Bala Street, CNP,  Mariah Grissom, DNP, Delia Collins, CNP, Erinn Estrada, CNP, Renae White, CNP, Chayo Desai, CNP, Shannon Marks, PA-C, Janis Mercer PA-C, Anya Lassiter, CNP, Alka Gregorio, CNP, Héctor Judd, CNP, Day Philip, CNP, Luda Melgar, CNP, Kizzy Mcleod, CNS, Tess Bullard, CNP, Amy Gomez CNP, Tracy Schwab, CNP         [facility]   IN-PATIENT SERVICE   [facility]    Progress Note    4/8/2024    7:17 AM    Name:   Annie Beltran  MRN:     [id number]     Acct:      [id number]   Room:   0133/0133-01   Day:  10  Admit Date:  3/29/2024  9:25 AM    PCP:   Shankar Brown MD  Code Status:  Full Code    Subjective:     C/C: L sided weakness    Interval History Status: improved.     Patient seen and examined at bedside, no acute events overnight.    She is doing fine overall, had a shower this am   Still having some back pain.  She is still on 2 L nasal cannula  She does work with PT/OT   Patient vitals, labs and all providers notes were reviewed,from overnight shift and morning updates were noted and discussed with the nurse     Brief History:     Per chart  Patient is a 79-year-old female s/p ALIF with PI L4-S1 and removal of spinal stimulator, DOS: 3/29/24 who was  oxyCODONE **OR** oxyCODONE, tiZANidine, HYDROmorphone    Data:     Past Medical History:   has a past medical history of Arthritis, Bell's palsy, Hearing loss, Hyperlipidemia, Hypertension, Lumbar disc disease, Seasonal allergies, Under care of service provider, and Wears glasses.    Social History:   reports that she has never smoked. She has never used smokeless tobacco. She reports current alcohol use of about 3.0 standard drinks of alcohol per week. She reports that she does not use drugs.     Family History:   Family History   Problem Relation Age of Onset    Heart Disease Mother     Kidney Disease Father     Heart Disease Sister        Vitals:  BP (!) 124/48   Pulse 72   Temp 98.2 °F (36.8 °C) (Axillary)   Resp 20   Ht 1.499 m (4' 11.02\")   Wt 66.5 kg (146 lb 9.7 oz)   SpO2 94%   BMI 29.59 kg/m²   Temp (24hrs), Av.9 °F (36.6 °C), Min:97.6 °F (36.4 °C), Max:98.3 °F (36.8 °C)    No results for input(s): \"POCGLU\" in the last 72 hours.    I/O (24Hr):    Intake/Output Summary (Last 24 hours) at 2024 0717  Last data filed at 2024 0500  Gross per 24 hour   Intake 1270 ml   Output 1600 ml   Net -330 ml       Labs:  Hematology:  Recent Labs     24  0258 24  0308   WBC 5.5 6.4   RBC 2.59* 2.45*   HGB 8.0* 7.5*   HCT 25.5* 24.0*   MCV 98.5 98.0   MCH 30.9 30.6   MCHC 31.4 31.3   RDW 14.8* 14.6*    258   MPV 9.1 9.1     Chemistry:  Recent Labs     24  0258 24  0629    135*   K 4.0 4.0    98   CO2 27 29   GLUCOSE 99 108*   BUN 8 11   CREATININE 0.7 0.8   ANIONGAP 9 8*   LABGLOM 87 70   CALCIUM 8.5* 8.8     Recent Labs     24  0258 24  0629   PROT 5.2* 5.5*   LABALBU 2.9* 3.3*   AST 55* 43*   ALT 27 24   ALKPHOS 314* 269*   BILITOT 0.4 0.4     ABG:No results found for: \"POCPH\", \"PHART\", \"PH\", \"POCPCO2\", \"STN4CSS\", \"PCO2\", \"POCPO2\", \"PO2ART\", \"PO2\", \"POCHCO3\", \"HXH8OPP\", \"HCO3\", \"NBEA\", \"PBEA\", \"BEART\", \"BE\", \"THGBART\", \"THB\", \"SQZ3EMD\", \"[id number]\",

## 2024-04-08 NOTE — CARE COORDINATION
Transitional Planning:   Rec'd call from Renae at [facility] who states pt insurance denied approval for SNF and requesting a P2P. [phone] with Dr. Chuy Zhao Ref # [id number] by 1630 today.   PS sent to Dr. Ally Cordova, read at 0847  Dr. Cordova requests Dr Scott complete P2P . States Avrilsabi Scott, no MD found by that name.   1025-PS Dr. Cordova again for clarification on MD for P2P. Sonia is neurology, awaiting clarification  1030-Spoke with pt and notified of insurance denial for [facility] and P2P. Discussed with pt SNF vs C pt states they already tried SNF and she is not comfortable with [facility] at this time. Wants to pursue [facility] and MD to complete P2P. Dr. Cordova called and LM for P2P

## 2024-04-08 NOTE — PROGRESS NOTES
Endovascular Neurosurgery Progress Note      Reason for evaluation: R CCA stenosis    SUBJECTIVE:   NAEO.      Eating breakfast, no complaint    History of Chief Complaint 3/29/2024:      79 y.o. female with right-sided Bell's palsy for 40 years, presbycusis, hyperlipidemia, hypertension, and chronic history of low back pain with remote lumbar surgery in the 1970s status post revision, status post DCM stimulator was admitted on 3/29/2024 for an anterior lumbar interbody fusion at L4 S1 as well as removal of her spinal cord stimulator.      On postop day 2.  At approximately 12 PM on 3/31/2024, patient's nurse noticed a new onset left facial droop.  Stroke alert was called at approximately 12:50 PM.  Upon arrival, patient's  and glucose 176.  On examination, patient had horizontal nystagmus as well as left upper extremity dysmetria with finger-nose-finger testing.  No obvious weakness was appreciated on examination.  No other focal deficits besides a chronic right facial weakness secondary to a remote right Bell's palsy.     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  MRI showed an acute right parietal stroke    Allergies  is allergic to ciprofloxacin.  Medications  Prior to Admission medications    Medication Sig Start Date End Date Taking? Authorizing Provider   loratadine (CLARITIN) 10 MG capsule Take 1 capsule by mouth daily    Geronimo Brower MD   lisinopril-hydroCHLOROthiazide (PRINZIDE;ZESTORETIC) 20-25 MG per tablet Take 1 tablet by mouth daily    Geronimo Brower MD   HYDROcodone-acetaminophen (NORCO) 5-325 MG per tablet Take 1 tablet by mouth nightly. Max Daily Amount: 1 tablet    Geronimo Brower MD   diphenhydrAMINE-APAP, sleep, (ACETAMINOPHEN PM)  MG tablet Take 2 tablets by mouth nightly as needed for Sleep    Geronimo Brower MD   Ketotifen Fumarate (ALLERGY EYE DROPS OP) Apply 1 drop to eye nightly     paralysis (total or near total paralysis of the lower face)   5a. Motor left arm: 0 - no drift, limb holds 90 (or 45) degrees for full 10 seconds   5b.  Motor right arm: 0 - no drift, limb holds 90 (or 45) degrees for full 10 seconds   6a. Motor left le - no drift; leg holds 30 degree position for full 5 seconds   6b  Motor right le - no drift; leg holds 30 degree position for full 5 seconds   7. Limb Ataxia: 0 - absent   8.  Sensory: 0 - normal; no sensory loss   9. Best Language:  0 - no aphasia, normal   10. Dysarthria: 0 - normal   11. Extinction and Inattention: 0 - no abnormality         Total:   2 (baseline from Bell palsy of right face for 40 years)     MRS: 0      LABS:   Reviewed.  Lab Results   Component Value Date    HGB 7.5 (L) 2024    WBC 6.4 2024     2024     (L) 2024    BUN 11 2024    CREATININE 0.8 2024    AST 43 (H) 2024    ALT 24 2024    MG 2.2 2024    APTT 22.0 (L) 2024    INR 0.9 2024      Lab Results   Component Value Date/Time    COVID19 Not Detected 2021 04:52 PM       RADIOLOGY:   Images were personally reviewed including:    CTA head neck 3/31/2024: R CCA occlusion      MRI brain 3/31/2024: acute right parietal stroke      ASSESSMENT:     79 year old woman with Bell palsy with right face droop for 40 years, HLD, HTN, came in here for L4 S1 anterior lumbar fusion and removal of spinal cord stimulator on the 3/29/2024.     On the 3/31/2024, patient was noted to have left face droop. 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 MRI showed an acute right parietal stroke.  Repeat CT PE confirms the presence of PE    PLAN:     - aspirin 81mg daily    - eliquis 5mg BID for PE    -Normalize blood pressure gradually (25% over the next 1 week)  - no endovascular intervention at this time    Patient to Dr Oneill in the clinic in 3-4 weeks, will then decide an outpatient DSA, if there is string sign,

## 2024-04-08 NOTE — DISCHARGE SUMMARY
day of discharge.    Discharge plan:     Disposition: [facility]    Physician Follow Up:     [facility]  [address] [address]  [phone]  Follow up  urinary retention    Alex Oneill MD  [address]  [place] [id number]  [phone]    Schedule an appointment as soon as possible for a visit in 2 week(s)         Requiring Further Evaluation/Follow Up POST HOSPITALIZATION/Incidental Findings:     Diet: cardiac diet    Activity: As tolerated    Instructions to Patient:     Discharge Medications:      Medication List        START taking these medications      apixaban 5 MG Tabs tablet  Commonly known as: ELIQUIS  Take 1 tablet by mouth 2 times daily     aspirin 81 MG EC tablet  Take 1 tablet by mouth daily     ferrous sulfate 325 (65 Fe) MG EC tablet  Commonly known as: FE TABS 325  Take 1 tablet by mouth daily (with breakfast)     rosuvastatin 40 MG tablet  Commonly known as: CRESTOR  Take 1 tablet by mouth nightly     senna 8.8 MG/5ML Syrp syrup  Commonly known as: SENOKOT  Take 5 mLs by mouth 2 times daily     tiZANidine 2 MG tablet  Commonly known as: ZANAFLEX  Take 1 tablet by mouth every 8 hours as needed (muscle spasms)            CONTINUE taking these medications      Acetaminophen PM  MG tablet  Generic drug: diphenhydrAMINE-APAP (sleep)     ASTEPRO NA     Claritin 10 MG capsule  Generic drug: loratadine     lisinopril-hydroCHLOROthiazide 20-25 MG per tablet  Commonly known as: PRINZIDE;ZESTORETIC            STOP taking these medications      ALLERGY EYE DROPS OP     HYDROcodone-acetaminophen 5-325 MG per tablet  Commonly known as: NORCO     simvastatin 20 MG tablet  Commonly known as: ZOCOR     TYLENOL 8 HOUR ARTHRITIS PAIN PO            ASK your doctor about these medications      oxyCODONE-acetaminophen 5-325 MG per tablet  Commonly known as: Percocet  Take 1 tablet by mouth every 6 hours as needed for Pain for up to 7 days. Intended supply: 7 days. Take lowest

## 2024-04-08 NOTE — PROGRESS NOTES
Speech Language Pathology  [facility]    Cognitive Treatment Note    Date: 4/8/2024  Patient’s Name: Annie Beltran  MRN: [id number]  Diagnosis:   Patient Active Problem List   Diagnosis Code    Lumbar post-laminectomy syndrome M96.1    Sacroiliitis, not elsewhere classified (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) I63.89    Post-op pain G89.18    Other pulmonary embolism without acute cor pulmonale ([facility]) I26.99    Dyslipidemia E78.5    Essential hypertension I10    Acute stroke due to ischemia ([facility]) I63.9       Pain: 0/10    Cognitive Treatment    Treatment time: 932-943      Subjective: [x] Alert [x] Cooperative     [] Confused     [] Agitated    [] Lethargic      Objective/Assessment:    Recall: Short-term recall of 3-units (related):3/3 independent X3. Recall of daily events 3/3 independent.  ST discussed recall strategies with patient who verbalized understanding.  Pt able to name, children, grandchildren and great grandchild.    Problem Solving/Reasoning: Word deductions 5/6 increased to 6/6 with mild cues    Plan:  [x] Continue ST services    [] Discharge from ST:      Discharge recommendations: []  Further therapy recommended at discharge.The patient should be able to tolerate at least 3 hours of therapy per day over 5 days or 15 hours over 7 days. [x] Further therapy recommended at discharge.   [] No therapy recommended at discharge.        Amanda Bowser M.S.CCC/SLP

## 2024-04-08 NOTE — PLAN OF CARE
Problem: Discharge Planning  Goal: Discharge to home or other facility with appropriate resources  Outcome: Progressing     Problem: Safety - Adult  Goal: Free from fall injury  4/8/2024 1834 by Griselda Alvares, RN  Outcome: Progressing  4/8/2024 0500 by Amanda Kendall, RN  Outcome: Progressing  Flowsheets (Taken 4/7/2024 2000)  Free From Fall Injury: Instruct family/caregiver on patient safety     Problem: Pain  Goal: Verbalizes/displays adequate comfort level or baseline comfort level  Outcome: Progressing     Problem: ABCDS Injury Assessment  Goal: Absence of physical injury  Outcome: Progressing     Problem: Respiratory - Adult  Goal: Achieves optimal ventilation and oxygenation  Outcome: Progressing     Problem: Neurosensory - Adult  Goal: Achieves stable or improved neurological status  Outcome: Progressing

## 2024-04-08 NOTE — PROGRESS NOTES
Occupational Therapy  Facility/Department: [address] STEPDOWN  Occupational Therapy Daily Treatment Note    Name: Annie Beltran  : 1945  MRN: [id number]  Date of Service: 2024    Discharge Recommendations:  Further therapy recommended at discharge.The patient should be able to tolerate at least 3 hours of therapy per day over 5 days or 15 hours over 7 days.   This patient may benefit from a Physical Medicine and Rehab consult.          Patient Diagnosis(es): The primary encounter diagnosis was Post-op pain. Diagnoses of Stroke-like symptom, Cerebral infarction, watershed distribution, unilateral, acute (HCC), and Other acute pulmonary embolism without acute cor pulmonale (HCC) were also pertinent to this visit.  Past Medical History:  has a past medical history of Arthritis, Bell's palsy, Hearing loss, Hyperlipidemia, Hypertension, Lumbar disc disease, Seasonal allergies, Under care of service provider, and Wears glasses.  Past Surgical History:  has a past surgical history that includes  section; Colonoscopy (); Appendectomy; Pain management procedure (Right, 2016); Pain management procedure (Right, 2016); Pain management procedure (2017); Nerve Block Lumb Facet Level 1 Bilateral (Right, 2017); Humerus Closed Reduction (Right, 2021); back surgery; Pain management procedure (2020); Shoulder Arthroplasty (Right, 10/18/2021); Spinal Cord Stimulator Surgery (); Spinal cord stimulator removal (2024); lumbar fusion (N/A, 3/29/2024); Spine surgery (N/A, 3/29/2024); and lumbar fusion (N/A, 3/29/2024).    Treatment Diagnosis: S/P Lumbar Spinal Fusion L4-S1 3/29/2024      Assessment   Performance deficits / Impairments: Decreased functional mobility ;Decreased high-level IADLs;Decreased ADL status;Decreased endurance;Decreased balance;Decreased safe awareness  Treatment Diagnosis: S/P Lumbar Spinal Fusion L4-S1 3/29/2024  Prognosis: Good  REQUIRES OT FOLLOW-UP:  understanding of LSO brace including wear schedule when OOB or ambulating and how to don/doff with Min A/cuing (Goal added 4/3/24 by MELISA Orosco/L)       Therapy Time   Individual Concurrent Group Co-treatment   Time In 0758         Time Out 0904         Minutes 66         Timed Code Treatment Minutes: 60 Minutes       TATO Flood/TED      Known

## 2024-04-08 NOTE — PROGRESS NOTES
Physical Therapy  Facility/Department: [address] STEPDOWN  Physical Therapy Daily Treatment Note    Name: Annie Beltran  : 1945  MRN: [id number]  Date of Service: 2024    Discharge Recommendations:  Patient would benefit from continued therapy after discharge   PT Equipment Recommendations  Equipment Needed: Yes  Mobility Devices: Walker  Walker: Rolling      Patient Diagnosis(es): The primary encounter diagnosis was Post-op pain. Diagnoses of Stroke-like symptom, Cerebral infarction, watershed distribution, unilateral, acute (HCC), and Other acute pulmonary embolism without acute cor pulmonale (HCC) were also pertinent to this visit.  Past Medical History:  has a past medical history of Arthritis, Bell's palsy, Hearing loss, Hyperlipidemia, Hypertension, Lumbar disc disease, Seasonal allergies, Under care of service provider, and Wears glasses.  Past Surgical History:  has a past surgical history that includes  section; Colonoscopy (); Appendectomy; Pain management procedure (Right, 2016); Pain management procedure (Right, 2016); Pain management procedure (2017); Nerve Block Lumb Facet Level 1 Bilateral (Right, 2017); Humerus Closed Reduction (Right, 2021); back surgery; Pain management procedure (2020); Shoulder Arthroplasty (Right, 10/18/2021); Spinal Cord Stimulator Surgery (); Spinal cord stimulator removal (2024); lumbar fusion (N/A, 3/29/2024); Spine surgery (N/A, 3/29/2024); and lumbar fusion (N/A, 3/29/2024).    Assessment   Body Structures, Functions, Activity Limitations Requiring Skilled Therapeutic Intervention: Decreased functional mobility ;Decreased strength;Decreased endurance;Decreased balance  Assessment: Pt ambulated 125 ft. in the negrete with JESSICA PEARSON. Pt would continue to benefit from skilled PT to increase safety/independence in order to return pt to prior level of function.  Therapy Prognosis: Good  Decision Making: Medium      Education  Patient Education  Education Given To: Patient  Education Provided: Role of Therapy;Plan of Care  Education Method: Verbal  Barriers to Learning: None  Education Outcome: Verbalized understanding      Therapy Time   Individual Concurrent Group Co-treatment   Time In 1112         Time Out 1135         Minutes 23         Timed Code Treatment Minutes: 23 Minutes       Aaron Crandall, PTA

## 2024-04-08 NOTE — DISCHARGE INSTR - COC
Continuity of Care Form    Patient Name: Annie Beltran   :  1945  MRN:  [id number]    Admit date:  3/29/2024  Discharge date:  ***    Code Status Order: Full Code   Advance Directives:   Advance Care Flowsheet Documentation       Date/Time Healthcare Directive Type of Healthcare Directive Copy in Chart Healthcare Agent Appointed Healthcare Agent's Name Healthcare Agent's Phone Number    [phone] Yes, patient has an advance directive for healthcare treatment -- -- -- -- --            Admitting Physician:  Arsenio Gary MD  PCP: Shankar Brown MD    Discharging Nurse: ***  Discharging Hospital Unit/Room#: 0133/0133-01  Discharging Unit Phone Number: ***    Emergency Contact:   Extended Emergency Contact Information  Primary Emergency Contact: [place] [address]  Home Phone: [phone]  Relation: Child    Past Surgical History:  Past Surgical History:   Procedure Laterality Date    APPENDECTOMY      BACK SURGERY      ,     SECTION      COLONOSCOPY      HUMERUS CLOSED REDUCTION Right 2021    HUMERUS CLOSED REDUCTION performed by Fabien Diana MD at [facility] OR    LUMBAR FUSION N/A 3/29/2024    ANTERIOR LUMBAR INTERBODY FUSION L4-S1 performed by Earl Verma DO at [facility] OR    LUMBAR FUSION N/A 3/29/2024    ANTERIOR APPROACH performed by Chandu Dior DO at [facility] OR    NERVE BLOCK LUMB FACET LEVEL 1 BILATERAL Right 2017    LUMBAR FACET L-4-5,L5-S1 performed by Kofi Hemphill MD at [facility] OR    PAIN MANAGEMENT PROCEDURE Right 2016    SI pain injection- Dr. Hemphill     PAIN MANAGEMENT PROCEDURE Right 2016    S1 RFA    PAIN MANAGEMENT PROCEDURE  2017    pain injection    PAIN MANAGEMENT PROCEDURE  2020    Pain/nerve stimulator    SHOULDER ARTHROPLASTY Right 10/18/2021    SHOULDER TOTAL ARTHROPLASTY-REVERSE performed by Fabien Diana MD at [facility] OR    SPINAL CORD STIMULATOR REMOVAL  2024    ANTERIOR LUMBAR INTERBODY  Therapy:  Current Nutrition Therapy:   - Oral Diet:  General    Routes of Feeding: Oral  Liquids: No Restrictions  Daily Fluid Restriction: no  Last Modified Barium Swallow with Video (Video Swallowing Test): not done    Treatments at the Time of Hospital Discharge:   Respiratory Treatments: ***  Oxygen Therapy:  is not on home oxygen therapy.  Ventilator:    - No ventilator support    Rehab Therapies: Physical Therapy and Occupational Therapy  Weight Bearing Status/Restrictions: No weight bearing restrictions  Other Medical Equipment (for information only, NOT a DME order):  walker and braces lso  Other Treatments: ***    Patient's personal belongings (please select all that are sent with patient):  Glasses    RN SIGNATURE:  Electronically signed by CARINE BRAND RN on 4/9/24 at 9:59 AM EDT    CASE MANAGEMENT/SOCIAL WORK SECTION    Inpatient Status Date: 3/29/24    Readmission Risk Assessment Score:  Readmission Risk              Risk of Unplanned Readmission:  14           Discharging to Facility/ Agency   Name: [facility]  Address:  Phone:  Fax:    Dialysis Facility (if applicable)   Name:  Address:  Dialysis Schedule:  Phone:  Fax:    / signature: Electronically signed by SEGUNDO LOPEZ on 4/9/24 at 8:59 AM EDT    PHYSICIAN SECTION    Prognosis: Fair    Condition at Discharge: Stable    Rehab Potential (if transferring to Rehab): Fair    Recommended Labs or Other Treatments After Discharge:     -Continue PT/OT  -Continue to monitor hemoglobin on blood thinners or antiplatelets  -CBC in 2 days  -Per neuroendovascular  Patient to Dr Oneill in the clinic in 3-4 weeks, will then decide an outpatient DSA, if there is string sign, will consider stenting it   - Per Urology   If she is going to a facility, they can remove the Jo in 4 to 5 days there and void trial the patient  -Aggressive ambulation, physical therapy as needed   - F/U with urology ,  for urinary retention    Physician

## 2024-04-08 NOTE — PLAN OF CARE
Problem: Safety - Adult  Goal: Free from fall injury  4/8/2024 0500 by Amanda Kendall, RN  Outcome: Progressing  Flowsheets (Taken 4/7/2024 2000)  Free From Fall Injury: Instruct family/caregiver on patient safety  4/7/2024 1836 by Chantale Verde, RN  Outcome: Progressing  Flowsheets (Taken 4/7/2024 1151)  Free From Fall Injury: Instruct family/caregiver on patient safety

## 2024-04-09 VITALS
RESPIRATION RATE: 11 BRPM | OXYGEN SATURATION: 97 % | HEART RATE: 69 BPM | SYSTOLIC BLOOD PRESSURE: 147 MMHG | WEIGHT: 146.61 LBS | HEIGHT: 59 IN | TEMPERATURE: 97.7 F | DIASTOLIC BLOOD PRESSURE: 61 MMHG | BODY MASS INDEX: 29.56 KG/M2

## 2024-04-09 PROCEDURE — 97530 THERAPEUTIC ACTIVITIES: CPT

## 2024-04-09 PROCEDURE — 2580000003 HC RX 258: Performed by: PSYCHIATRY & NEUROLOGY

## 2024-04-09 PROCEDURE — 97535 SELF CARE MNGMENT TRAINING: CPT

## 2024-04-09 PROCEDURE — 6370000000 HC RX 637 (ALT 250 FOR IP): Performed by: PSYCHIATRY & NEUROLOGY

## 2024-04-09 PROCEDURE — 97116 GAIT TRAINING THERAPY: CPT

## 2024-04-09 PROCEDURE — 6370000000 HC RX 637 (ALT 250 FOR IP)

## 2024-04-09 PROCEDURE — 6370000000 HC RX 637 (ALT 250 FOR IP): Performed by: ORTHOPAEDIC SURGERY

## 2024-04-09 PROCEDURE — 6370000000 HC RX 637 (ALT 250 FOR IP): Performed by: NURSE PRACTITIONER

## 2024-04-09 PROCEDURE — 6370000000 HC RX 637 (ALT 250 FOR IP): Performed by: HOSPITALIST

## 2024-04-09 PROCEDURE — 97110 THERAPEUTIC EXERCISES: CPT

## 2024-04-09 PROCEDURE — 6370000000 HC RX 637 (ALT 250 FOR IP): Performed by: FAMILY MEDICINE

## 2024-04-09 RX ORDER — METHOCARBAMOL 500 MG/1
500 TABLET, FILM COATED ORAL ONCE
Status: COMPLETED | OUTPATIENT
Start: 2024-04-09 | End: 2024-04-09

## 2024-04-09 RX ADMIN — FERROUS SULFATE TAB EC 325 MG (65 MG FE EQUIVALENT) 325 MG: 325 (65 FE) TABLET DELAYED RESPONSE at 08:22

## 2024-04-09 RX ADMIN — ASPIRIN 81 MG: 81 TABLET, COATED ORAL at 08:23

## 2024-04-09 RX ADMIN — PANTOPRAZOLE SODIUM 40 MG: 40 TABLET, DELAYED RELEASE ORAL at 08:23

## 2024-04-09 RX ADMIN — APIXABAN 5 MG: 5 TABLET, FILM COATED ORAL at 08:23

## 2024-04-09 RX ADMIN — LISINOPRIL 20 MG: 20 TABLET ORAL at 08:23

## 2024-04-09 RX ADMIN — TIZANIDINE 2 MG: 2 TABLET ORAL at 06:56

## 2024-04-09 RX ADMIN — OXYCODONE 10 MG: 5 TABLET ORAL at 04:16

## 2024-04-09 RX ADMIN — METHOCARBAMOL 500 MG: 500 TABLET ORAL at 02:35

## 2024-04-09 RX ADMIN — ACETAMINOPHEN 325MG 650 MG: 325 TABLET ORAL at 08:23

## 2024-04-09 RX ADMIN — HYDROCHLOROTHIAZIDE 25 MG: 25 TABLET ORAL at 08:22

## 2024-04-09 RX ADMIN — SODIUM CHLORIDE, PRESERVATIVE FREE 10 ML: 5 INJECTION INTRAVENOUS at 08:24

## 2024-04-09 RX ADMIN — ACETAMINOPHEN 325MG 650 MG: 325 TABLET ORAL at 02:37

## 2024-04-09 ASSESSMENT — PAIN DESCRIPTION - DIRECTION
RADIATING_TOWARDS: NO RADIATION

## 2024-04-09 ASSESSMENT — PAIN DESCRIPTION - ORIENTATION
ORIENTATION: MID
ORIENTATION: LEFT;RIGHT;MID
ORIENTATION: RIGHT;LEFT;MID
ORIENTATION: POSTERIOR
ORIENTATION: LEFT;POSTERIOR;MID

## 2024-04-09 ASSESSMENT — PAIN DESCRIPTION - PAIN TYPE
TYPE: ACUTE PAIN;SURGICAL PAIN

## 2024-04-09 ASSESSMENT — PAIN SCALES - GENERAL
PAINLEVEL_OUTOF10: 8
PAINLEVEL_OUTOF10: 8
PAINLEVEL_OUTOF10: 6
PAINLEVEL_OUTOF10: 0
PAINLEVEL_OUTOF10: 6
PAINLEVEL_OUTOF10: 6
PAINLEVEL_OUTOF10: 0

## 2024-04-09 ASSESSMENT — PAIN DESCRIPTION - LOCATION
LOCATION: BACK
LOCATION: BACK;LEG
LOCATION: BACK;LEG
LOCATION: BACK
LOCATION: BACK;LEG
LOCATION: ABDOMEN;BACK

## 2024-04-09 ASSESSMENT — PAIN DESCRIPTION - DESCRIPTORS
DESCRIPTORS: CRAMPING;ACHING
DESCRIPTORS: ACHING
DESCRIPTORS: CRAMPING;ACHING
DESCRIPTORS: CRAMPING;ACHING

## 2024-04-09 ASSESSMENT — PAIN DESCRIPTION - FREQUENCY
FREQUENCY: INTERMITTENT

## 2024-04-09 ASSESSMENT — PAIN DESCRIPTION - ONSET
ONSET: ON-GOING

## 2024-04-09 NOTE — PROGRESS NOTES
Physical Therapy  Facility/Department: [address] STEPDOWN  Physical Therapy Daily Treatment Note    Name: Annie Beltran  : 1945  MRN: [id number]  Date of Service: 2024    Discharge Recommendations:  Patient would benefit from continued therapy after discharge   PT Equipment Recommendations  Equipment Needed: Yes  Mobility Devices: Walker  Walker: Rolling      Patient Diagnosis(es): The primary encounter diagnosis was Post-op pain. Diagnoses of Stroke-like symptom, Cerebral infarction, watershed distribution, unilateral, acute (HCC), and Other acute pulmonary embolism without acute cor pulmonale (HCC) were also pertinent to this visit.  Past Medical History:  has a past medical history of Arthritis, Bell's palsy, Hearing loss, Hyperlipidemia, Hypertension, Lumbar disc disease, Seasonal allergies, Under care of service provider, and Wears glasses.  Past Surgical History:  has a past surgical history that includes  section; Colonoscopy (); Appendectomy; Pain management procedure (Right, 2016); Pain management procedure (Right, 2016); Pain management procedure (2017); Nerve Block Lumb Facet Level 1 Bilateral (Right, 2017); Humerus Closed Reduction (Right, 2021); back surgery; Pain management procedure (2020); Shoulder Arthroplasty (Right, 10/18/2021); Spinal Cord Stimulator Surgery (); Spinal cord stimulator removal (2024); lumbar fusion (N/A, 3/29/2024); Spine surgery (N/A, 3/29/2024); and lumbar fusion (N/A, 3/29/2024).    Assessment   Body Structures, Functions, Activity Limitations Requiring Skilled Therapeutic Intervention: Decreased functional mobility ;Decreased strength;Decreased endurance;Decreased balance  Assessment: With LSO and gaitbelt donned, pt performed STS transfers from bedside chair with CGA and RW. Pt ambulated 125 ft in the negrete with SBA-CGA and RW. Pt then performed seated exercises, with slight increase to 7/10 pain following

## 2024-04-09 NOTE — CARE COORDINATION
Discharge Report    [facility]  Clinical Case Management Department  Written by: SEGUNDO LOPEZ    Patient Name: Annie OTILIA Devin  Attending Provider: No att. providers found  Admit Date: 3/29/2024  9:25 AM  MRN: [id number]  Account: [id number]                     : 1945  Discharge Date: 2024      Disposition: [facility]    SEGUNDO LOPEZ

## 2024-04-09 NOTE — PROGRESS NOTES
Endovascular Neurosurgery Progress Note      Reason for evaluation: R CCA stenosis    SUBJECTIVE:       Eating breakfast, no complaint overnight    History of Chief Complaint 3/29/2024:      79 y.o. female with right-sided Bell's palsy for 40 years, presbycusis, hyperlipidemia, hypertension, and chronic history of low back pain with remote lumbar surgery in the 1970s status post revision, status post DCM stimulator was admitted on 3/29/2024 for an anterior lumbar interbody fusion at L4 S1 as well as removal of her spinal cord stimulator.      On postop day 2.  At approximately 12 PM on 3/31/2024, patient's nurse noticed a new onset left facial droop.  Stroke alert was called at approximately 12:50 PM.  Upon arrival, patient's  and glucose 176.  On examination, patient had horizontal nystagmus as well as left upper extremity dysmetria with finger-nose-finger testing.  No obvious weakness was appreciated on examination.  No other focal deficits besides a chronic right facial weakness secondary to a remote right Bell's palsy.     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  MRI showed an acute right parietal stroke    Allergies  is allergic to ciprofloxacin.  Medications  Prior to Admission medications    Medication Sig Start Date End Date Taking? Authorizing Provider   apixaban (ELIQUIS) 5 MG TABS tablet Take 1 tablet by mouth 2 times daily 4/8/24  Yes Ally Cordova MD   aspirin 81 MG EC tablet Take 1 tablet by mouth daily 4/9/24  Yes Ally Cordova MD   ferrous sulfate (FE TABS 325) 325 (65 Fe) MG EC tablet Take 1 tablet by mouth daily (with breakfast) 4/9/24  Yes Ally Cordova MD   rosuvastatin (CRESTOR) 40 MG tablet Take 1 tablet by mouth nightly 4/8/24  Yes Ally Cordova MD   tiZANidine (ZANAFLEX) 2 MG tablet Take 1 tablet by mouth every 8 hours as needed (muscle spasms) 4/8/24 4/10/24 Yes Ally Cordova MD   senna (SENOKOT) 8.8 MG/5ML  deferred    NIH Stroke Scale:   1a  Level of consciousness: 0 - alert; keenly responsive   1b. LOC questions:  0 - answers both questions correctly   1c. LOC commands: 0 - performs both tasks correctly   2.  Best Gaze: 0 - normal   3. Visual: 0 - no visual loss   4. Facial Palsy: 2 - partial paralysis (total or near total paralysis of the lower face)   5a. Motor left arm: 0 - no drift, limb holds 90 (or 45) degrees for full 10 seconds   5b.  Motor right arm: 0 - no drift, limb holds 90 (or 45) degrees for full 10 seconds   6a. Motor left le - no drift; leg holds 30 degree position for full 5 seconds   6b  Motor right le - no drift; leg holds 30 degree position for full 5 seconds   7. Limb Ataxia: 0 - absent   8.  Sensory: 0 - normal; no sensory loss   9. Best Language:  0 - no aphasia, normal   10. Dysarthria: 0 - normal   11. Extinction and Inattention: 0 - no abnormality         Total:   2 (baseline from Bell palsy of right face for 40 years)     MRS: 0      LABS:   Reviewed.  Lab Results   Component Value Date    HGB 7.5 (L) 2024    WBC 6.4 2024     2024     (L) 2024    BUN 11 2024    CREATININE 0.8 2024    AST 43 (H) 2024    ALT 24 2024    MG 2.2 2024    APTT 22.0 (L) 2024    INR 0.9 2024      Lab Results   Component Value Date/Time    COVID19 Not Detected 2021 04:52 PM       RADIOLOGY:   Images were personally reviewed including:    CTA head neck 3/31/2024: R CCA occlusion      MRI brain 3/31/2024: acute right parietal stroke      ASSESSMENT:     79 year old woman with Bell palsy with right face droop for 40 years, HLD, HTN, came in here for L4 S1 anterior lumbar fusion and removal of spinal cord stimulator on the 3/29/2024.     On the 3/31/2024, patient was noted to have left face droop. 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 MRI showed an acute right parietal stroke.  Repeat CT PE confirms the

## 2024-04-09 NOTE — PROGRESS NOTES
Report called to Suzette INGRAM at [facility] [phone]. Questions answered. Ambulance here to take to rehab.

## 2024-04-09 NOTE — PROGRESS NOTES
Occupational Therapy  Facility/Department: [address] STEPDOWN  Occupational Therapy Treatment Session    Name: Annie Beltran  : 1945  MRN: [id number]  Date of Service: 2024    Discharge Recommendations:  Patient would benefit from continued therapy after discharge  OT Equipment Recommendations  Equipment Needed: Yes  Walker: Rolling  ADL Assistive Devices: Reacher;Long-handled Sponge     Primary Complaint: The patient is a 79 y.o. female who presents with right-sided Bell's palsy, presbycusis, hyperlipidemia, hypertension, and chronic history of low back pain with remote lumbar surgery in the 1970s status post revision, status post DCM stimulator was admitted on 3/29/2024 for an anterior lumbar interbody fusion at L4 S1 as well as removal of her spinal cord stimulator.  Patient is currently postop day 2.  At approximately 12 PM on 3/31/2024, patient's nurse noticed a new onset left facial droop.  Stroke alert was called at approximately 12:50 PM.  Upon arrival, patient's  and glucose 176.  On examination, patient had horizontal nystagmus as well as left upper extremity dysmetria with finger-nose-finger testing.  No obvious weakness was appreciated on examination.  No other focal deficits besides a chronic right facial weakness secondary to a remote right Bell's palsy.  Patient underwent stat CT head without any acute intracranial abnormalities.  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  Patient was then taken for stat MRI to confirm stroke etiology.  Given recent removal of DCM stimulator, patient also underwent a stat abdominal x-ray prior to acquisition of MR imaging.  Patient's MRI showed a watershed infarct along the right hemisphere most notably the right parietal occipital junction as well as the right centrum semiovale.  Patient was loaded with aspirin and Plavix, loaded with 1 L normal saline, and started on  Score : 35.96  ADL Inpatient CMS 0-100% Score: 53.32  ADL Inpatient CMS G-Code Modifier : CK    Goals  Short Term Goals  Time Frame for Short Term Goals: Pt will, by discharge:  Short Term Goal 1: Dem I with functional transfers for daily occupations  Short Term Goal 2: Dem I with dynamic mobility with LRD for household distances  Short Term Goal 3: Dem good safety awareness tech for all transfers/mobility  Short Term Goal 4: Dem I with adl performance with use of AE as needed  Short Term Goal 5: Dem a 10 min static/dynamic standing balance task with supervision to increase activity tolerance for ADL's/IADLS  Short Term Goal 6: Dem I with bed mobility, with bed flat utilizing good body mechanics for log roll tech  Short Term Goal 7: Pt will demo Good understanding of LSO brace including wear schedule when OOB or ambulating and how to don/doff with Min A/cuing (Goal added 4/3/24 by Lesa Heard OTR/L)       Therapy Time   Individual Concurrent Group Co-treatment   Time In 0908         Time Out 0949         Minutes 41         Timed Code Treatment Minutes: 41 Minutes       CASTRO CAPELLAN OTR/L

## 2024-04-09 NOTE — CARE COORDINATION
Transitional Planning  Call from Griselda at [facility], P2P was overturned and able to accept today.  Call back number [phone]    [facility] faxed with request for transportation.      10 am Call from Nikolay at [facility],  time is 1030 am.  Called Dtr Brandy, update provided.  Update provided to patient and JUAN JOSE Marei .  Called [facility], spoke with Griselda, notified of transportation time.      1014 am BRINDA and Hens faxed to [facility]. Report number [phone]. Transportation packet given to JUAN JOSE Marie.

## 2024-04-09 NOTE — PLAN OF CARE
Problem: Discharge Planning  Goal: Discharge to home or other facility with appropriate resources  4/9/2024 0622 by Danae Venegas RN  Outcome: Progressing  4/8/2024 1834 by Griselda Alvares RN  Outcome: Progressing     Problem: Safety - Adult  Goal: Free from fall injury  4/9/2024 0622 by Danae Venegas RN  Outcome: Progressing  4/8/2024 1834 by Griselda Alvares RN  Outcome: Progressing     Problem: Pain  Goal: Verbalizes/displays adequate comfort level or baseline comfort level  4/9/2024 0622 by Danae Venegas RN  Outcome: Progressing  4/8/2024 1834 by Griselda Alvares RN  Outcome: Progressing     Problem: ABCDS Injury Assessment  Goal: Absence of physical injury  4/9/2024 0622 by Danae Venegas RN  Outcome: Progressing  4/8/2024 1834 by Griselda Alvares RN  Outcome: Progressing     Problem: Respiratory - Adult  Goal: Achieves optimal ventilation and oxygenation  4/9/2024 0622 by Danae Venegas RN  Outcome: Progressing  4/8/2024 1834 by Griselda Alvares RN  Outcome: Progressing     Problem: Neurosensory - Adult  Goal: Achieves stable or improved neurological status  4/9/2024 0622 by Danae Venegas RN  Outcome: Progressing  4/8/2024 1834 by Griselda Alvares RN  Outcome: Progressing  Goal: Absence of seizures  Outcome: Progressing  Goal: Remains free of injury related to seizures activity  Outcome: Progressing  Goal: Achieves maximal functionality and self care  Outcome: Progressing     Problem: Cardiovascular - Adult  Goal: Maintains optimal cardiac output and hemodynamic stability  Outcome: Progressing  Goal: Absence of cardiac dysrhythmias or at baseline  Outcome: Progressing     Problem: Skin/Tissue Integrity - Adult  Goal: Skin integrity remains intact  Outcome: Progressing  Goal: Incisions, wounds, or drain sites healing without S/S of infection  Outcome: Progressing  Goal: Oral mucous membranes remain intact  Outcome: Progressing     Problem: Musculoskeletal - Adult  Goal: Return mobility to safest level of

## 2024-04-11 ENCOUNTER — HOSPITAL ENCOUNTER (OUTPATIENT)
Age: 79
Setting detail: SPECIMEN
Discharge: HOME | End: 2024-04-11

## 2024-04-11 LAB
BASOPHILS # BLD: <0.03 K/UL (ref 0–0.2)
BASOPHILS NFR BLD: 0 % (ref 0–2)
EOSINOPHIL # BLD: 0.11 K/UL (ref 0–0.44)
EOSINOPHILS RELATIVE PERCENT: 2 % (ref 1–4)
ERYTHROCYTE [DISTWIDTH] IN BLOOD BY AUTOMATED COUNT: 14.4 % (ref 11.8–14.4)
HCT VFR BLD AUTO: 25.6 % (ref 36.3–47.1)
HGB BLD-MCNC: 8.4 G/DL (ref 11.9–15.1)
IMM GRANULOCYTES # BLD AUTO: 0.08 K/UL (ref 0–0.3)
IMM GRANULOCYTES NFR BLD: 1 %
LYMPHOCYTES NFR BLD: 0.79 K/UL (ref 1.1–3.7)
LYMPHOCYTES RELATIVE PERCENT: 12 % (ref 24–43)
MCH RBC QN AUTO: 30.8 PG (ref 25.2–33.5)
MCHC RBC AUTO-ENTMCNC: 32.8 G/DL (ref 28.4–34.8)
MCV RBC AUTO: 93.8 FL (ref 82.6–102.9)
MONOCYTES NFR BLD: 1.43 K/UL (ref 0.1–1.2)
MONOCYTES NFR BLD: 22 % (ref 3–12)
NEUTROPHILS NFR BLD: 63 % (ref 36–65)
NEUTS SEG NFR BLD: 4.09 K/UL (ref 1.5–8.1)
NRBC BLD-RTO: 0 PER 100 WBC
PLATELET # BLD AUTO: 444 K/UL (ref 138–453)
PMV BLD AUTO: 8.7 FL (ref 8.1–13.5)
RBC # BLD AUTO: 2.73 M/UL (ref 3.95–5.11)
WBC OTHER # BLD: 6.5 K/UL (ref 3.5–11.3)

## 2024-04-11 PROCEDURE — 85025 COMPLETE CBC W/AUTO DIFF WBC: CPT

## 2024-04-11 PROCEDURE — 36415 COLL VENOUS BLD VENIPUNCTURE: CPT

## 2024-04-11 PROCEDURE — P9603 ONE-WAY ALLOW PRORATED MILES: HCPCS

## 2024-04-15 ENCOUNTER — HOSPITAL ENCOUNTER (OUTPATIENT)
Age: 79
Setting detail: SPECIMEN
Discharge: HOME | End: 2024-04-15

## 2024-04-15 LAB
ALBUMIN SERPL-MCNC: 3.5 G/DL (ref 3.5–5.2)
ALBUMIN/GLOB SERPL: 1.1 {RATIO} (ref 1–2.5)
ALP SERPL-CCNC: 279 U/L (ref 35–104)
ALT SERPL-CCNC: 12 U/L (ref 5–33)
ANION GAP SERPL CALCULATED.3IONS-SCNC: 12 MMOL/L (ref 9–17)
AST SERPL-CCNC: 25 U/L
BASOPHILS # BLD: <0.03 K/UL (ref 0–0.2)
BASOPHILS NFR BLD: 0 % (ref 0–2)
BILIRUB SERPL-MCNC: 0.3 MG/DL (ref 0.3–1.2)
BUN SERPL-MCNC: 18 MG/DL (ref 8–23)
BUN/CREAT SERPL: 23 (ref 9–20)
CALCIUM SERPL-MCNC: 8.7 MG/DL (ref 8.6–10.4)
CHLORIDE SERPL-SCNC: 99 MMOL/L (ref 98–107)
CO2 SERPL-SCNC: 23 MMOL/L (ref 20–31)
CREAT SERPL-MCNC: 0.8 MG/DL (ref 0.5–0.9)
EOSINOPHIL # BLD: 0.1 K/UL (ref 0–0.44)
EOSINOPHILS RELATIVE PERCENT: 2 % (ref 1–4)
ERYTHROCYTE [DISTWIDTH] IN BLOOD BY AUTOMATED COUNT: 14.8 % (ref 11.8–14.4)
GFR SERPL CREATININE-BSD FRML MDRD: 74 ML/MIN/1.73M2
GLUCOSE SERPL-MCNC: 103 MG/DL (ref 70–99)
HCT VFR BLD AUTO: 24.9 % (ref 36.3–47.1)
HGB BLD-MCNC: 8 G/DL (ref 11.9–15.1)
IMM GRANULOCYTES # BLD AUTO: 0.07 K/UL (ref 0–0.3)
IMM GRANULOCYTES NFR BLD: 1 %
LYMPHOCYTES NFR BLD: 0.98 K/UL (ref 1.1–3.7)
LYMPHOCYTES RELATIVE PERCENT: 19 % (ref 24–43)
MCH RBC QN AUTO: 30.9 PG (ref 25.2–33.5)
MCHC RBC AUTO-ENTMCNC: 32.1 G/DL (ref 28.4–34.8)
MCV RBC AUTO: 96.1 FL (ref 82.6–102.9)
MONOCYTES NFR BLD: 1.11 K/UL (ref 0.1–1.2)
MONOCYTES NFR BLD: 22 % (ref 3–12)
NEUTROPHILS NFR BLD: 56 % (ref 36–65)
NEUTS SEG NFR BLD: 2.81 K/UL (ref 1.5–8.1)
NRBC BLD-RTO: 0 PER 100 WBC
PLATELET # BLD AUTO: 471 K/UL (ref 138–453)
PMV BLD AUTO: 8.4 FL (ref 8.1–13.5)
POTASSIUM SERPL-SCNC: 4.3 MMOL/L (ref 3.7–5.3)
PROT SERPL-MCNC: 6.8 G/DL (ref 6.4–8.3)
RBC # BLD AUTO: 2.59 M/UL (ref 3.95–5.11)
SODIUM SERPL-SCNC: 134 MMOL/L (ref 135–144)
WBC OTHER # BLD: 5.1 K/UL (ref 3.5–11.3)

## 2024-04-15 PROCEDURE — 80053 COMPREHEN METABOLIC PANEL: CPT

## 2024-04-15 PROCEDURE — 36415 COLL VENOUS BLD VENIPUNCTURE: CPT

## 2024-04-15 PROCEDURE — 85025 COMPLETE CBC W/AUTO DIFF WBC: CPT

## 2024-04-15 PROCEDURE — P9604 ONE-WAY ALLOW PRORATED TRIP: HCPCS

## 2024-04-22 ENCOUNTER — HOSPITAL ENCOUNTER (OUTPATIENT)
Age: 79
Setting detail: SPECIMEN
Discharge: HOME | End: 2024-04-22

## 2024-04-22 LAB
ALBUMIN SERPL-MCNC: 3.7 G/DL (ref 3.5–5.2)
ALBUMIN/GLOB SERPL: 1.2 {RATIO} (ref 1–2.5)
ALP SERPL-CCNC: 204 U/L (ref 35–104)
ALT SERPL-CCNC: 10 U/L (ref 5–33)
ANION GAP SERPL CALCULATED.3IONS-SCNC: 11 MMOL/L (ref 9–17)
AST SERPL-CCNC: 19 U/L
BASOPHILS # BLD: <0.03 K/UL (ref 0–0.2)
BASOPHILS NFR BLD: 1 % (ref 0–2)
BILIRUB SERPL-MCNC: 0.4 MG/DL (ref 0.3–1.2)
BUN SERPL-MCNC: 16 MG/DL (ref 8–23)
BUN/CREAT SERPL: 20 (ref 9–20)
CALCIUM SERPL-MCNC: 8.8 MG/DL (ref 8.6–10.4)
CHLORIDE SERPL-SCNC: 98 MMOL/L (ref 98–107)
CO2 SERPL-SCNC: 25 MMOL/L (ref 20–31)
CREAT SERPL-MCNC: 0.8 MG/DL (ref 0.5–0.9)
EOSINOPHIL # BLD: 0.08 K/UL (ref 0–0.44)
EOSINOPHILS RELATIVE PERCENT: 2 % (ref 1–4)
ERYTHROCYTE [DISTWIDTH] IN BLOOD BY AUTOMATED COUNT: 14.9 % (ref 11.8–14.4)
GFR SERPL CREATININE-BSD FRML MDRD: 74 ML/MIN/1.73M2
GLUCOSE SERPL-MCNC: 92 MG/DL (ref 70–99)
HCT VFR BLD AUTO: 26.4 % (ref 36.3–47.1)
HGB BLD-MCNC: 8.4 G/DL (ref 11.9–15.1)
IMM GRANULOCYTES # BLD AUTO: 0.04 K/UL (ref 0–0.3)
IMM GRANULOCYTES NFR BLD: 1 %
LYMPHOCYTES NFR BLD: 1.1 K/UL (ref 1.1–3.7)
LYMPHOCYTES RELATIVE PERCENT: 25 % (ref 24–43)
MCH RBC QN AUTO: 30.8 PG (ref 25.2–33.5)
MCHC RBC AUTO-ENTMCNC: 31.8 G/DL (ref 28.4–34.8)
MCV RBC AUTO: 96.7 FL (ref 82.6–102.9)
MONOCYTES NFR BLD: 1.03 K/UL (ref 0.1–1.2)
MONOCYTES NFR BLD: 24 % (ref 3–12)
NEUTROPHILS NFR BLD: 48 % (ref 36–65)
NEUTS SEG NFR BLD: 2.08 K/UL (ref 1.5–8.1)
NRBC BLD-RTO: 0 PER 100 WBC
PLATELET # BLD AUTO: 300 K/UL (ref 138–453)
PMV BLD AUTO: 8.6 FL (ref 8.1–13.5)
POTASSIUM SERPL-SCNC: 4.2 MMOL/L (ref 3.7–5.3)
PROT SERPL-MCNC: 6.8 G/DL (ref 6.4–8.3)
RBC # BLD AUTO: 2.73 M/UL (ref 3.95–5.11)
SODIUM SERPL-SCNC: 134 MMOL/L (ref 135–144)
WBC OTHER # BLD: 4.4 K/UL (ref 3.5–11.3)

## 2024-04-22 PROCEDURE — 85025 COMPLETE CBC W/AUTO DIFF WBC: CPT

## 2024-04-22 PROCEDURE — 36415 COLL VENOUS BLD VENIPUNCTURE: CPT

## 2024-04-22 PROCEDURE — P9604 ONE-WAY ALLOW PRORATED TRIP: HCPCS

## 2024-04-22 PROCEDURE — 80053 COMPREHEN METABOLIC PANEL: CPT

## 2024-04-26 ENCOUNTER — OFFICE VISIT (OUTPATIENT)
Dept: NEUROLOGY | Age: 79
End: 2024-04-26
Payer: MEDICARE

## 2024-04-26 VITALS
HEART RATE: 116 BPM | SYSTOLIC BLOOD PRESSURE: 105 MMHG | HEIGHT: 59 IN | DIASTOLIC BLOOD PRESSURE: 61 MMHG | BODY MASS INDEX: 29.39 KG/M2 | WEIGHT: 145.8 LBS

## 2024-04-26 DIAGNOSIS — Z86.73 HISTORY OF CEREBROVASCULAR ACCIDENT (CVA) FROM RIGHT CAROTID ARTERY OCCLUSION INVOLVING RIGHT MIDDLE CEREBRAL ARTERY TERRITORY: ICD-10-CM

## 2024-04-26 DIAGNOSIS — I63.9 ACUTE ISCHEMIC STROKE (HCC): Primary | ICD-10-CM

## 2024-04-26 PROCEDURE — 99215 OFFICE O/P EST HI 40 MIN: CPT | Performed by: PSYCHIATRY & NEUROLOGY

## 2024-04-26 PROCEDURE — 3074F SYST BP LT 130 MM HG: CPT | Performed by: PSYCHIATRY & NEUROLOGY

## 2024-04-26 PROCEDURE — 1123F ACP DISCUSS/DSCN MKR DOCD: CPT | Performed by: PSYCHIATRY & NEUROLOGY

## 2024-04-26 PROCEDURE — 3078F DIAST BP <80 MM HG: CPT | Performed by: PSYCHIATRY & NEUROLOGY

## 2024-04-26 RX ORDER — OXYCODONE HYDROCHLORIDE AND ACETAMINOPHEN 5; 325 MG/1; MG/1
1 TABLET ORAL EVERY 6 HOURS PRN
COMMUNITY

## 2024-04-26 RX ORDER — ATORVASTATIN CALCIUM 80 MG/1
80 TABLET, FILM COATED ORAL NIGHTLY
COMMUNITY

## 2024-04-26 RX ORDER — PHENOL 1.4 %
1 AEROSOL, SPRAY (ML) MUCOUS MEMBRANE DAILY
COMMUNITY

## 2024-04-26 RX ORDER — TIZANIDINE 2 MG/1
2 TABLET ORAL EVERY 8 HOURS PRN
COMMUNITY

## 2024-04-26 RX ORDER — LOPERAMIDE HYDROCHLORIDE 2 MG/1
4 CAPSULE ORAL 4 TIMES DAILY PRN
COMMUNITY

## 2024-04-26 RX ORDER — MAGNESIUM HYDROXIDE/ALUMINUM HYDROXICE/SIMETHICONE 120; 1200; 1200 MG/30ML; MG/30ML; MG/30ML
15 SUSPENSION ORAL EVERY 6 HOURS PRN
COMMUNITY

## 2024-04-26 RX ORDER — ENEMA 19; 7 G/133ML; G/133ML
1 ENEMA RECTAL
COMMUNITY

## 2024-04-26 RX ORDER — POLYETHYLENE GLYCOL 3350 17 G/17G
17 POWDER, FOR SOLUTION ORAL 2 TIMES DAILY
COMMUNITY

## 2024-04-26 RX ORDER — ACETAMINOPHEN 325 MG/1
650 TABLET ORAL EVERY 4 HOURS PRN
COMMUNITY

## 2024-04-26 NOTE — PROGRESS NOTES
Endovascular Neurosurgery Clinic Note      Reason for evaluation: R CCA stenosis    SUBJECTIVE:     Came with son, no complaint, improving.    In a rehab now, going home tomorrow    History of Chief Complaint 3/29/2024:      79 y.o. female with right-sided Bell's palsy for 40 years, presbycusis, hyperlipidemia, hypertension, and chronic history of low back pain with remote lumbar surgery in the 1970s status post revision, status post DCM stimulator was admitted on 3/29/2024 for an anterior lumbar interbody fusion at L4 S1 as well as removal of her spinal cord stimulator.      On postop day 2.  At approximately 12 PM on 3/31/2024, patient's nurse noticed a new onset left facial droop.  Stroke alert was called at approximately 12:50 PM.  Upon arrival, patient's  and glucose 176.  On examination, patient had horizontal nystagmus as well as left upper extremity dysmetria with finger-nose-finger testing.  No obvious weakness was appreciated on examination.  No other focal deficits besides a chronic right facial weakness secondary to a remote right Bell's palsy.     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  MRI showed an acute right parietal stroke    Allergies  is allergic to ciprofloxacin.  Medications  Prior to Admission medications    Medication Sig Start Date End Date Taking? Authorizing Provider   apixaban (ELIQUIS) 5 MG TABS tablet Take 1 tablet by mouth 2 times daily 4/8/24   Ally Cordova MD   aspirin 81 MG EC tablet Take 1 tablet by mouth daily 4/9/24   Ally Cordova MD   ferrous sulfate (FE TABS 325) 325 (65 Fe) MG EC tablet Take 1 tablet by mouth daily (with breakfast) 4/9/24   Ally Cordova MD   rosuvastatin (CRESTOR) 40 MG tablet Take 1 tablet by mouth nightly 4/8/24   Ally Cordova MD   senna (SENOKOT) 8.8 MG/5ML SYRP syrup Take 5 mLs by mouth 2 times daily 4/8/24   Ally Cordova MD   loratadine (CLARITIN) 10 MG capsule Take 1

## 2024-04-29 ENCOUNTER — HOSPITAL ENCOUNTER (OUTPATIENT)
Age: 79
Setting detail: SPECIMEN
Discharge: HOME | End: 2024-04-29

## 2024-05-11 PROBLEM — I63.89 CEREBRAL INFARCTION, WATERSHED DISTRIBUTION, UNILATERAL, ACUTE (HCC): Status: RESOLVED | Noted: 2024-03-31 | Resolved: 2024-05-11

## 2024-05-11 PROBLEM — Z86.73 HISTORY OF CEREBROVASCULAR ACCIDENT (CVA) FROM RIGHT CAROTID ARTERY OCCLUSION INVOLVING RIGHT MIDDLE CEREBRAL ARTERY TERRITORY: Status: ACTIVE | Noted: 2024-05-11

## 2024-05-11 PROBLEM — I63.9 ACUTE STROKE DUE TO ISCHEMIA (HCC): Status: RESOLVED | Noted: 2024-04-05 | Resolved: 2024-05-11

## 2024-05-11 PROBLEM — E87.1 ACUTE HYPONATREMIA: Status: RESOLVED | Noted: 2021-08-18 | Resolved: 2024-05-11

## 2024-05-14 ENCOUNTER — HOSPITAL ENCOUNTER (OUTPATIENT)
Dept: CT IMAGING | Age: 79
Discharge: HOME | End: 2024-05-16
Payer: MEDICARE

## 2024-05-14 DIAGNOSIS — I63.9 ACUTE ISCHEMIC STROKE (HCC): ICD-10-CM

## 2024-05-14 PROCEDURE — 70496 CT ANGIOGRAPHY HEAD: CPT

## 2024-05-14 PROCEDURE — 6360000004 HC RX CONTRAST MEDICATION: Performed by: PSYCHIATRY & NEUROLOGY

## 2024-05-14 RX ADMIN — IOPAMIDOL 75 ML: 755 INJECTION, SOLUTION INTRAVENOUS at 16:17

## 2024-05-31 ENCOUNTER — OFFICE VISIT (OUTPATIENT)
Dept: NEUROLOGY | Age: 79
End: 2024-05-31
Payer: MEDICARE

## 2024-05-31 VITALS
DIASTOLIC BLOOD PRESSURE: 77 MMHG | WEIGHT: 146 LBS | BODY MASS INDEX: 29.43 KG/M2 | HEART RATE: 73 BPM | HEIGHT: 59 IN | SYSTOLIC BLOOD PRESSURE: 154 MMHG

## 2024-05-31 DIAGNOSIS — I65.21 RIGHT-SIDED EXTRACRANIAL CAROTID ARTERY OCCLUSION: Primary | ICD-10-CM

## 2024-05-31 DIAGNOSIS — Z86.73 HISTORY OF ISCHEMIC MIDDLE CEREBRAL ARTERY STROKE: ICD-10-CM

## 2024-05-31 PROCEDURE — 3077F SYST BP >= 140 MM HG: CPT | Performed by: PSYCHIATRY & NEUROLOGY

## 2024-05-31 PROCEDURE — 1123F ACP DISCUSS/DSCN MKR DOCD: CPT | Performed by: PSYCHIATRY & NEUROLOGY

## 2024-05-31 PROCEDURE — 3078F DIAST BP <80 MM HG: CPT | Performed by: PSYCHIATRY & NEUROLOGY

## 2024-05-31 PROCEDURE — 99215 OFFICE O/P EST HI 40 MIN: CPT | Performed by: PSYCHIATRY & NEUROLOGY

## 2024-05-31 RX ORDER — ASPIRIN 81 MG/1
81 TABLET ORAL DAILY
Qty: 30 TABLET | Refills: 3 | Status: SHIPPED | OUTPATIENT
Start: 2024-05-31

## 2024-05-31 RX ORDER — HYDROCODONE BITARTRATE AND ACETAMINOPHEN 5; 325 MG/1; MG/1
1 TABLET ORAL 2 TIMES DAILY PRN
COMMUNITY
Start: 2024-05-20

## 2024-05-31 RX ORDER — SIMVASTATIN 20 MG
20 TABLET ORAL NIGHTLY
COMMUNITY

## 2024-05-31 RX ORDER — CLOPIDOGREL BISULFATE 75 MG/1
75 TABLET ORAL DAILY
Qty: 30 TABLET | Refills: 3 | Status: SHIPPED | OUTPATIENT
Start: 2024-05-31

## 2024-05-31 NOTE — PROGRESS NOTES
Neurocritical Care & Neurointervention  [facility]  [facility]roke [facility] [place]  Electronically signed 5/31/2024 at 9:41 AM

## 2024-05-31 NOTE — PATIENT INSTRUCTIONS
Start taking aspirin 81mg daily    Start taking plavix 75mg daily 7 days before the procedure.    Stop taking eliquis 2 days  before the procedure.    Right carotid stenting    F/u with Dr. Cotton in 3 months

## 2024-06-08 PROBLEM — I65.21 RIGHT-SIDED EXTRACRANIAL CAROTID ARTERY OCCLUSION: Status: ACTIVE | Noted: 2024-06-08

## 2024-06-19 ENCOUNTER — TELEPHONE (OUTPATIENT)
Dept: NEUROSURGERY | Age: 79
End: 2024-06-19

## 2024-06-19 NOTE — TELEPHONE ENCOUNTER
Pt called to let us know that Monday when she went to see her other dr, she fell outside on the concrete. They took care of her there. She states she received stitches,and a tetanus shot. She does have a lot of bruising also.

## 2024-06-28 RX ORDER — FENOFIBRATE 145 MG/1
145 TABLET, COATED ORAL DAILY
COMMUNITY

## 2024-06-28 RX ORDER — ACETAMINOPHEN 500 MG
1000 TABLET ORAL EVERY 6 HOURS PRN
COMMUNITY

## 2024-07-03 ENCOUNTER — ANESTHESIA (OUTPATIENT)
Dept: INTERVENTIONAL RADIOLOGY/VASCULAR | Age: 79
End: 2024-07-03
Payer: MEDICARE

## 2024-07-03 ENCOUNTER — ANESTHESIA EVENT (OUTPATIENT)
Dept: INTERVENTIONAL RADIOLOGY/VASCULAR | Age: 79
End: 2024-07-03
Payer: MEDICARE

## 2024-07-03 ENCOUNTER — HOSPITAL ENCOUNTER (INPATIENT)
Dept: INTERVENTIONAL RADIOLOGY/VASCULAR | Age: 79
LOS: 1 days | Discharge: HOME | DRG: 068 | End: 2024-07-04
Attending: STUDENT IN AN ORGANIZED HEALTH CARE EDUCATION/TRAINING PROGRAM | Admitting: STUDENT IN AN ORGANIZED HEALTH CARE EDUCATION/TRAINING PROGRAM
Payer: MEDICARE

## 2024-07-03 DIAGNOSIS — I63.239 CEREBROVASCULAR ACCIDENT (CVA) DUE TO STENOSIS OF CAROTID ARTERY, UNSPECIFIED BLOOD VESSEL LATERALITY (HCC): ICD-10-CM

## 2024-07-03 PROBLEM — Z86.79 HISTORY OF CAROTID STENOSIS: Status: ACTIVE | Noted: 2024-07-03

## 2024-07-03 LAB
ABO + RH BLD: NORMAL
ACT BLD: 232 SEC (ref 79–149)
ARM BAND NUMBER: NORMAL
BLOOD BANK SAMPLE EXPIRATION: NORMAL
BLOOD GROUP ANTIBODIES SERPL: NEGATIVE
BUN BLD-MCNC: 28 MG/DL (ref 8–26)
CA-I BLD-SCNC: 1.19 MMOL/L (ref 1.15–1.33)
CHLORIDE BLD-SCNC: 94 MMOL/L (ref 98–107)
CLOSURE TME COLL+ADP BLD: >300 SEC (ref 67–112)
CO2 BLD CALC-SCNC: 23 MMOL/L (ref 22–30)
COLLAGEN EPINEPHRINE TIME: 151 SEC (ref 85–172)
EGFR, POC: 50 ML/MIN/1.73M2
GLUCOSE BLD-MCNC: 100 MG/DL (ref 74–100)
HCO3 VENOUS: 22.9 MMOL/L (ref 22–29)
HCT VFR BLD AUTO: 36 % (ref 36–46)
NEGATIVE BASE EXCESS, VEN: 1.7 MMOL/L (ref 0–2)
O2 SAT, VEN: 61.6 % (ref 60–85)
PCO2 VENOUS: 37.8 MM HG (ref 41–51)
PH VENOUS: 7.39 (ref 7.32–7.43)
PLATELET FUNCTION INTERP: ABNORMAL
PO2 VENOUS: 32.1 MM HG (ref 30–50)
POC ANION GAP: 6 MMOL/L (ref 7–16)
POC CREATININE: 1.1 MG/DL (ref 0.51–1.19)
POC HEMOGLOBIN (CALC): 12.3 G/DL (ref 12–16)
POC LACTIC ACID: 1.5 MMOL/L (ref 0.56–1.39)
POTASSIUM BLD-SCNC: 4.6 MMOL/L (ref 3.5–4.5)
SODIUM BLD-SCNC: 122 MMOL/L (ref 138–146)

## 2024-07-03 PROCEDURE — 3700000001 HC ADD 15 MINUTES (ANESTHESIA)

## 2024-07-03 PROCEDURE — 2580000003 HC RX 258: Performed by: ANESTHESIOLOGY

## 2024-07-03 PROCEDURE — 6360000002 HC RX W HCPCS

## 2024-07-03 PROCEDURE — 86901 BLOOD TYPING SEROLOGIC RH(D): CPT

## 2024-07-03 PROCEDURE — 6370000000 HC RX 637 (ALT 250 FOR IP)

## 2024-07-03 PROCEDURE — 99223 1ST HOSP IP/OBS HIGH 75: CPT | Performed by: PSYCHIATRY & NEUROLOGY

## 2024-07-03 PROCEDURE — 85347 COAGULATION TIME ACTIVATED: CPT

## 2024-07-03 PROCEDURE — 82947 ASSAY GLUCOSE BLOOD QUANT: CPT

## 2024-07-03 PROCEDURE — 6360000004 HC RX CONTRAST MEDICATION

## 2024-07-03 PROCEDURE — 82565 ASSAY OF CREATININE: CPT

## 2024-07-03 PROCEDURE — 80051 ELECTROLYTE PANEL: CPT

## 2024-07-03 PROCEDURE — 85014 HEMATOCRIT: CPT

## 2024-07-03 PROCEDURE — 82330 ASSAY OF CALCIUM: CPT

## 2024-07-03 PROCEDURE — 6370000000 HC RX 637 (ALT 250 FOR IP): Performed by: STUDENT IN AN ORGANIZED HEALTH CARE EDUCATION/TRAINING PROGRAM

## 2024-07-03 PROCEDURE — 82803 BLOOD GASES ANY COMBINATION: CPT

## 2024-07-03 PROCEDURE — 85576 BLOOD PLATELET AGGREGATION: CPT

## 2024-07-03 PROCEDURE — 83605 ASSAY OF LACTIC ACID: CPT

## 2024-07-03 PROCEDURE — 86850 RBC ANTIBODY SCREEN: CPT

## 2024-07-03 PROCEDURE — 2000000000 HC ICU R&B

## 2024-07-03 PROCEDURE — 36415 COLL VENOUS BLD VENIPUNCTURE: CPT

## 2024-07-03 PROCEDURE — 84520 ASSAY OF UREA NITROGEN: CPT

## 2024-07-03 PROCEDURE — 3700000000 HC ANESTHESIA ATTENDED CARE

## 2024-07-03 PROCEDURE — 86900 BLOOD TYPING SEROLOGIC ABO: CPT

## 2024-07-03 PROCEDURE — B3181ZZ FLUOROSCOPY OF BILATERAL INTERNAL CAROTID ARTERIES USING LOW OSMOLAR CONTRAST: ICD-10-PCS | Performed by: STUDENT IN AN ORGANIZED HEALTH CARE EDUCATION/TRAINING PROGRAM

## 2024-07-03 PROCEDURE — 36223 PLACE CATH CAROTID/INOM ART: CPT

## 2024-07-03 PROCEDURE — C1769 GUIDE WIRE: HCPCS

## 2024-07-03 RX ORDER — MIDAZOLAM HYDROCHLORIDE 2 MG/2ML
1 INJECTION, SOLUTION INTRAMUSCULAR; INTRAVENOUS EVERY 10 MIN PRN
Status: DISCONTINUED | OUTPATIENT
Start: 2024-07-03 | End: 2024-07-04

## 2024-07-03 RX ORDER — ONDANSETRON 4 MG/1
4 TABLET, ORALLY DISINTEGRATING ORAL EVERY 8 HOURS PRN
Status: DISCONTINUED | OUTPATIENT
Start: 2024-07-03 | End: 2024-07-04 | Stop reason: HOSPADM

## 2024-07-03 RX ORDER — LABETALOL HYDROCHLORIDE 5 MG/ML
10 INJECTION, SOLUTION INTRAVENOUS EVERY 4 HOURS PRN
Status: DISCONTINUED | OUTPATIENT
Start: 2024-07-03 | End: 2024-07-04 | Stop reason: HOSPADM

## 2024-07-03 RX ORDER — LISINOPRIL AND HYDROCHLOROTHIAZIDE 25; 20 MG/1; MG/1
1 TABLET ORAL DAILY
Status: DISCONTINUED | OUTPATIENT
Start: 2024-07-03 | End: 2024-07-04 | Stop reason: HOSPADM

## 2024-07-03 RX ORDER — FENTANYL CITRATE 50 UG/ML
25 INJECTION, SOLUTION INTRAMUSCULAR; INTRAVENOUS EVERY 5 MIN PRN
Status: DISCONTINUED | OUTPATIENT
Start: 2024-07-03 | End: 2024-07-03

## 2024-07-03 RX ORDER — HYDRALAZINE HYDROCHLORIDE 20 MG/ML
10 INJECTION INTRAMUSCULAR; INTRAVENOUS EVERY 4 HOURS PRN
Status: DISCONTINUED | OUTPATIENT
Start: 2024-07-03 | End: 2024-07-04 | Stop reason: HOSPADM

## 2024-07-03 RX ORDER — SODIUM CHLORIDE 0.9 % (FLUSH) 0.9 %
5-40 SYRINGE (ML) INJECTION PRN
Status: DISCONTINUED | OUTPATIENT
Start: 2024-07-03 | End: 2024-07-04 | Stop reason: HOSPADM

## 2024-07-03 RX ORDER — ONDANSETRON 2 MG/ML
4 INJECTION INTRAMUSCULAR; INTRAVENOUS EVERY 6 HOURS PRN
Status: DISCONTINUED | OUTPATIENT
Start: 2024-07-03 | End: 2024-07-04 | Stop reason: HOSPADM

## 2024-07-03 RX ORDER — ONDANSETRON 2 MG/ML
4 INJECTION INTRAMUSCULAR; INTRAVENOUS
Status: ACTIVE | OUTPATIENT
Start: 2024-07-03 | End: 2024-07-04

## 2024-07-03 RX ORDER — SODIUM CHLORIDE 0.9 % (FLUSH) 0.9 %
5-40 SYRINGE (ML) INJECTION EVERY 12 HOURS SCHEDULED
Status: DISCONTINUED | OUTPATIENT
Start: 2024-07-03 | End: 2024-07-04 | Stop reason: HOSPADM

## 2024-07-03 RX ORDER — ASPIRIN 81 MG/1
81 TABLET ORAL DAILY
Status: DISCONTINUED | OUTPATIENT
Start: 2024-07-04 | End: 2024-07-04 | Stop reason: HOSPADM

## 2024-07-03 RX ORDER — SODIUM CHLORIDE 0.9 % (FLUSH) 0.9 %
5-40 SYRINGE (ML) INJECTION PRN
Status: DISCONTINUED | OUTPATIENT
Start: 2024-07-03 | End: 2024-07-03

## 2024-07-03 RX ORDER — CEFAZOLIN SODIUM 1 G/3ML
INJECTION, POWDER, FOR SOLUTION INTRAMUSCULAR; INTRAVENOUS PRN
Status: DISCONTINUED | OUTPATIENT
Start: 2024-07-03 | End: 2024-07-03 | Stop reason: SDUPTHER

## 2024-07-03 RX ORDER — LIDOCAINE HYDROCHLORIDE 10 MG/ML
1 INJECTION, SOLUTION EPIDURAL; INFILTRATION; INTRACAUDAL; PERINEURAL
Status: DISPENSED | OUTPATIENT
Start: 2024-07-03 | End: 2024-07-04

## 2024-07-03 RX ORDER — METHOCARBAMOL 750 MG/1
750 TABLET, FILM COATED ORAL ONCE
Status: COMPLETED | OUTPATIENT
Start: 2024-07-03 | End: 2024-07-03

## 2024-07-03 RX ORDER — FENTANYL CITRATE 50 UG/ML
INJECTION, SOLUTION INTRAMUSCULAR; INTRAVENOUS PRN
Status: DISCONTINUED | OUTPATIENT
Start: 2024-07-03 | End: 2024-07-03 | Stop reason: SDUPTHER

## 2024-07-03 RX ORDER — ONDANSETRON 2 MG/ML
INJECTION INTRAMUSCULAR; INTRAVENOUS PRN
Status: DISCONTINUED | OUTPATIENT
Start: 2024-07-03 | End: 2024-07-03 | Stop reason: SDUPTHER

## 2024-07-03 RX ORDER — SODIUM CHLORIDE 9 MG/ML
INJECTION, SOLUTION INTRAVENOUS PRN
Status: DISCONTINUED | OUTPATIENT
Start: 2024-07-03 | End: 2024-07-04 | Stop reason: HOSPADM

## 2024-07-03 RX ORDER — NALOXONE HYDROCHLORIDE 0.4 MG/ML
INJECTION, SOLUTION INTRAMUSCULAR; INTRAVENOUS; SUBCUTANEOUS PRN
Status: DISCONTINUED | OUTPATIENT
Start: 2024-07-03 | End: 2024-07-03

## 2024-07-03 RX ORDER — ATORVASTATIN CALCIUM 10 MG/1
10 TABLET, FILM COATED ORAL DAILY
Status: DISCONTINUED | OUTPATIENT
Start: 2024-07-04 | End: 2024-07-04 | Stop reason: HOSPADM

## 2024-07-03 RX ORDER — IODIXANOL 270 MG/ML
100 INJECTION, SOLUTION INTRAVASCULAR
Status: COMPLETED | OUTPATIENT
Start: 2024-07-03 | End: 2024-07-03

## 2024-07-03 RX ORDER — CLOPIDOGREL BISULFATE 75 MG/1
75 TABLET ORAL DAILY
Status: DISCONTINUED | OUTPATIENT
Start: 2024-07-04 | End: 2024-07-04

## 2024-07-03 RX ORDER — SODIUM CHLORIDE 0.9 % (FLUSH) 0.9 %
5-40 SYRINGE (ML) INJECTION EVERY 12 HOURS SCHEDULED
Status: DISCONTINUED | OUTPATIENT
Start: 2024-07-03 | End: 2024-07-03

## 2024-07-03 RX ORDER — HEPARIN SODIUM 1000 [USP'U]/ML
INJECTION, SOLUTION INTRAVENOUS; SUBCUTANEOUS PRN
Status: DISCONTINUED | OUTPATIENT
Start: 2024-07-03 | End: 2024-07-03 | Stop reason: SDUPTHER

## 2024-07-03 RX ORDER — ACETAMINOPHEN 325 MG/1
650 TABLET ORAL EVERY 4 HOURS PRN
Status: DISCONTINUED | OUTPATIENT
Start: 2024-07-03 | End: 2024-07-04 | Stop reason: HOSPADM

## 2024-07-03 RX ORDER — PROTAMINE SULFATE 10 MG/ML
INJECTION, SOLUTION INTRAVENOUS PRN
Status: DISCONTINUED | OUTPATIENT
Start: 2024-07-03 | End: 2024-07-03 | Stop reason: SDUPTHER

## 2024-07-03 RX ORDER — LABETALOL HYDROCHLORIDE 5 MG/ML
INJECTION, SOLUTION INTRAVENOUS PRN
Status: DISCONTINUED | OUTPATIENT
Start: 2024-07-03 | End: 2024-07-03 | Stop reason: SDUPTHER

## 2024-07-03 RX ORDER — SODIUM CHLORIDE 9 MG/ML
INJECTION, SOLUTION INTRAVENOUS PRN
Status: DISCONTINUED | OUTPATIENT
Start: 2024-07-03 | End: 2024-07-03

## 2024-07-03 RX ORDER — FENOFIBRATE 160 MG/1
160 TABLET ORAL DAILY
Status: DISCONTINUED | OUTPATIENT
Start: 2024-07-04 | End: 2024-07-04 | Stop reason: HOSPADM

## 2024-07-03 RX ADMIN — LABETALOL HYDROCHLORIDE 10 MG: 5 INJECTION, SOLUTION INTRAVENOUS at 16:58

## 2024-07-03 RX ADMIN — SODIUM CHLORIDE: 9 INJECTION, SOLUTION INTRAVENOUS at 11:24

## 2024-07-03 RX ADMIN — PROTAMINE SULFATE 40 MG: 10 INJECTION, SOLUTION INTRAVENOUS at 12:30

## 2024-07-03 RX ADMIN — HYDRALAZINE HYDROCHLORIDE 10 MG: 20 INJECTION INTRAMUSCULAR; INTRAVENOUS at 15:49

## 2024-07-03 RX ADMIN — FENTANYL CITRATE 25 MCG: 50 INJECTION, SOLUTION INTRAMUSCULAR; INTRAVENOUS at 12:38

## 2024-07-03 RX ADMIN — HEPARIN SODIUM 4100 UNITS: 1000 INJECTION INTRAVENOUS; SUBCUTANEOUS at 12:09

## 2024-07-03 RX ADMIN — FENTANYL CITRATE 25 MCG: 50 INJECTION, SOLUTION INTRAMUSCULAR; INTRAVENOUS at 12:42

## 2024-07-03 RX ADMIN — METHOCARBAMOL 750 MG: 750 TABLET ORAL at 16:58

## 2024-07-03 RX ADMIN — FENTANYL CITRATE 25 MCG: 50 INJECTION, SOLUTION INTRAMUSCULAR; INTRAVENOUS at 11:57

## 2024-07-03 RX ADMIN — CEFAZOLIN 2 G: 1 INJECTION, POWDER, FOR SOLUTION INTRAMUSCULAR; INTRAVENOUS at 11:59

## 2024-07-03 RX ADMIN — Medication 5 MG: at 21:28

## 2024-07-03 RX ADMIN — FENTANYL CITRATE 25 MCG: 50 INJECTION, SOLUTION INTRAMUSCULAR; INTRAVENOUS at 12:53

## 2024-07-03 RX ADMIN — IODIXANOL 24 ML: 270 INJECTION, SOLUTION INTRAVASCULAR at 12:52

## 2024-07-03 RX ADMIN — FENTANYL CITRATE 50 MCG: 50 INJECTION, SOLUTION INTRAMUSCULAR; INTRAVENOUS at 11:25

## 2024-07-03 RX ADMIN — LABETALOL HYDROCHLORIDE 5 MG: 5 INJECTION, SOLUTION INTRAVENOUS at 13:10

## 2024-07-03 RX ADMIN — ACETAMINOPHEN 650 MG: 325 TABLET ORAL at 15:32

## 2024-07-03 RX ADMIN — FENTANYL CITRATE 50 MCG: 50 INJECTION, SOLUTION INTRAMUSCULAR; INTRAVENOUS at 11:33

## 2024-07-03 RX ADMIN — SODIUM CHLORIDE: 9 INJECTION, SOLUTION INTRAVENOUS at 12:29

## 2024-07-03 RX ADMIN — ONDANSETRON 4 MG: 2 INJECTION INTRAMUSCULAR; INTRAVENOUS at 12:02

## 2024-07-03 ASSESSMENT — PAIN - FUNCTIONAL ASSESSMENT: PAIN_FUNCTIONAL_ASSESSMENT: PREVENTS OR INTERFERES SOME ACTIVE ACTIVITIES AND ADLS

## 2024-07-03 ASSESSMENT — PAIN DESCRIPTION - LOCATION
LOCATION: BACK
LOCATION: BACK

## 2024-07-03 ASSESSMENT — PAIN SCALES - GENERAL
PAINLEVEL_OUTOF10: 6
PAINLEVEL_OUTOF10: 0
PAINLEVEL_OUTOF10: 4
PAINLEVEL_OUTOF10: 6
PAINLEVEL_OUTOF10: 0
PAINLEVEL_OUTOF10: 0
PAINLEVEL_OUTOF10: 6

## 2024-07-03 ASSESSMENT — PAIN DESCRIPTION - ORIENTATION: ORIENTATION: MID

## 2024-07-03 ASSESSMENT — PAIN DESCRIPTION - DESCRIPTORS: DESCRIPTORS: ACHING;DISCOMFORT

## 2024-07-03 NOTE — BRIEF OP NOTE
[facility]    NEUROENDOVASCULAR SERVICE: POST-OP NOTE: 7/3/2024    Pt Name: Annie Beltran  MRN: [id number]  YOB: 1945  Date of Procedure: 7/3/2024  Primary Care Physician: Shankar Brown MD        Pre-Procedural Diagnosis:R CCA-ICA near complete occlusion   Post-Procedural Diagnosis:R proximal CCA complete occlusion       Procedure Performed:Diagnostic Cerebral Angiogram    Surgeon:   Hazel Cotton MD    Fellow:  MD Adalid Zhao MD PhD    Assisting Tech:  Pascale Hayward    PRE-PROCEDURAL EXAM:  Prestroke baseline mRS MODIFIED JOHNY SCORE: 0 - No symptoms at all.  Neurological exam performed and unchanged from initial H&P or consult      Anesthesia: MAC anesthesia  An Immediate re-assessment was completed prior to sedation, and it is determined to be safe to proceed.  Complications: none    Intra-Operative EXAM:  Patient sedated with unchanged limited neurological exam    EBL: < Minimal      Cc            Specimens: Were not Obtained  Contrast:     Visipaque 270 low osmolar 24 Cc             Fluoro: 7.4 min    Findings:  Please see dictated Radiology note for further details  Complete occlusion of the proximal right CCA. The procedure was aborted due to the chronicity of the occlusion.         POST-PROCEDURAL EXAM :   Stable neurological Exam  Neurological exam performed and unchanged from initial H&P or consult    Closure:  right Manual compression 8   F        POST-PROCEDURAL MONITORING : see orders  Disposition: Neuro ICU      Recommendations:  Back to Neuro ICU  Do not bend right leg for 3 hours.  Groin checks per protocol.  Peripheral pulse checks per protocol.  SBP goal 120-160  The patient can be transitioned back to [facility] tomorrow morning   Follow up with Dr. Cotton 3-4 months after discharge.        MD Hazel Zhao MD   Pager: [phone]  Stroke, Neurocritical Care & Neurointervention  [facility] Stroke  [facility] Stroke [facility] [place]  Electronically signed 7/3/2024 at 12:46 PM

## 2024-07-03 NOTE — H&P
Endovascular Neurosurgery H&P      Reason for evaluation: R CCA stenosis    SUBJECTIVE:     Doing well. Will proceed with TF-ELEONORA of the CARLITO        History of Chief Complaint 3/29/2024:      79 y.o. female with right-sided Bell's palsy for 40 years, presbycusis, hyperlipidemia, hypertension, and chronic history of low back pain with remote lumbar surgery in the 1970s status post revision, status post DCM stimulator was admitted on 3/29/2024 for an anterior lumbar interbody fusion at L4 S1 as well as removal of her spinal cord stimulator.      On postop day 2.  At approximately 12 PM on 3/31/2024, patient's nurse noticed a new onset left facial droop.  Stroke alert was called at approximately 12:50 PM.  Upon arrival, patient's  and glucose 176.  On examination, patient had horizontal nystagmus as well as left upper extremity dysmetria with finger-nose-finger testing.  No obvious weakness was appreciated on examination.  No other focal deficits besides a chronic right facial weakness secondary to a remote right Bell's palsy.     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  MRI showed an acute right parietal stroke    Allergies  is allergic to ciprofloxacin.  Medications  Prior to Admission medications    Medication Sig Start Date End Date Taking? Authorizing Provider   fenofibrate (TRICOR) 145 MG tablet Take 1 tablet by mouth daily   Yes Provider, MD Geronimo   acetaminophen (TYLENOL) 500 MG tablet Take 2 tablets by mouth every 6 hours as needed for Pain   Yes Provider, MD Geronimo   apixaban (ELIQUIS) 5 MG TABS tablet Take 1 tablet by mouth 2 times daily  Patient taking differently: Take 1 tablet by mouth 2 times daily Pt. Will stop 6-30-24 for OR 7-3-24/ Mgmt. Oneill 6/24/24   Venancio Ferguson MD   HYDROcodone-acetaminophen (NORCO) 5-325 MG per tablet Take 1 tablet by mouth 2 times daily as needed.  Patient not taking: Reported on 6/28/2024  Negative for weakness and tingling  negative for headaches and dizziness    PSYCHIATRIC:  negative for anxiety      Review of systems otherwise negative.      OBJECTIVE:     Vitals:    24 0845   BP: (!) 162/70   Pulse: 70   Resp: 16   SpO2: 99%            General:  Gen: normal habitus, NAD  HEENT: NCAT, mucosa moist  Cvs: RRR, S1 S2 normal  Resp: symmetric unlabored breathing  Abd: s/nd/nt  Ext: no edema  Skin: no lesions seen, warm and dry    Neuro:  Gen: awake and alert, oriented x3.   Lang/speech: no aphasia or dysarthria. Follows commands.  CN: PERRL, EOMI, VFF, V1-3 intact, face symmetric, hearing intact, shoulder shrug symmetric, tongue midline  Motor: grossly 5/5 UE and LE b/l  Sense: LT intact in all 4 ext.  Coord: FTN and HTS intact b/l  DTR: deferred  Gait: deferred    NIH Stroke Scale:   1a  Level of consciousness: 0 - alert; keenly responsive   1b. LOC questions:  0 - answers both questions correctly   1c. LOC commands: 0 - performs both tasks correctly   2.  Best Gaze: 0 - normal   3. Visual: 0 - no visual loss   4. Facial Palsy: 2 - partial paralysis (total or near total paralysis of the lower face)   5a. Motor left arm: 0 - no drift, limb holds 90 (or 45) degrees for full 10 seconds   5b.  Motor right arm: 0 - no drift, limb holds 90 (or 45) degrees for full 10 seconds   6a. Motor left le - no drift; leg holds 30 degree position for full 5 seconds   6b  Motor right le - no drift; leg holds 30 degree position for full 5 seconds   7. Limb Ataxia: 0 - absent   8.  Sensory: 0 - normal; no sensory loss   9. Best Language:  0 - no aphasia, normal   10. Dysarthria: 0 - normal   11. Extinction and Inattention: 0 - no abnormality         Total:   2 (baseline from Bell palsy of right face for 40 years)     MRS: 0      LABS:   Reviewed.  Lab Results   Component Value Date    HGB 8.4 (L) 2024    WBC 4.4 2024     2024     (L) 2024    BUN 16 2024     CREATININE 0.8 04/22/2024    AST 19 04/22/2024    ALT 10 04/22/2024    MG 2.2 04/03/2024    APTT 22.0 (L) 04/02/2024    INR 0.9 04/02/2024      Lab Results   Component Value Date/Time    COVID19 Not Detected 08/17/2021 04:52 PM       RADIOLOGY:   Images were personally reviewed including:    CTA head neck 3/31/2024: R CCA occlusion      MRI brain 3/31/2024: acute right parietal stroke      CTA head neck 5/14/2024: persistent R CCA severe stenosis/occlusion      ASSESSMENT:     79 year old woman with Bell palsy with right face droop for 40 years, HLD, HTN, came in here for L4 S1 anterior lumbar fusion and removal of spinal cord stimulator on the 3/29/2024.     On the 3/31/2024, patient was noted to have left face droop. 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 MRI showed an acute right parietal stroke.  Repeat CT PE confirms the presence of PE    PLAN:     - aspirin 81mg daily and Plavix 75 daily  - Holding Eliquis for 48 hours (for PE)  - Will proceed with TF-ELEONORA of the CARLITO       Risks and benefits discussed, risks include but are not limited to bruising, stroke, subarachnoid hemorrhage, death, retroperitoneal hematoma, pseudoaneurysm, lower extremity/renal/peripheral vascular compromise, informed consent obtained.      Case discussed  with Dr. Cotton attending.    Venancio Joyce MD  Stroke, Neurocritical Care & Neurointervention  [facility]  [facility] [place]  Electronically signed 7/3/2024 at 8:47 AM

## 2024-07-03 NOTE — ANESTHESIA PRE PROCEDURE
tablet Take 1 tablet by mouth nightly (Patient not taking: Reported on 4/26/2024) 30 tablet 3   • senna (SENOKOT) 8.8 MG/5ML SYRP syrup Take 5 mLs by mouth 2 times daily (Patient not taking: Reported on 5/31/2024) 105 mL 0   • loratadine (CLARITIN) 10 MG capsule Take 1 capsule by mouth daily (Patient not taking: Reported on 6/28/2024)     • lisinopril-hydroCHLOROthiazide (PRINZIDE;ZESTORETIC) 20-25 MG per tablet Take 1 tablet by mouth daily     • diphenhydrAMINE-APAP, sleep, (ACETAMINOPHEN PM)  MG tablet Take 2 tablets by mouth nightly as needed for Sleep (Patient not taking: Reported on 4/26/2024)     • Azelastine HCl (ASTEPRO NA) 2 drops by Nasal route daily       No current facility-administered medications for this encounter.       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   • Status post lumbar spinal fusion Z98.1   • Status post lumbar spine operation Z98.890   • Post-op pain G89.18   • Other pulmonary embolism without acute cor pulmonale ([facility]) I26.99   • Dyslipidemia E78.5   • Essential hypertension I10   • History of cerebrovascular accident (CVA) from right carotid artery occlusion involving right middle cerebral artery territory Z86.73   • Right-sided extracranial carotid artery occlusion I65.21       Past Medical History:        Diagnosis Date   • Acute hyponatremia 08/18/2021   • Acute stroke due to ischemia ([facility]) 04/05/2024   • Arthritis    • Bell's palsy    • Cerebral infarction, watershed distribution, unilateral, acute ([facility]) 03/31/2024   • Hearing

## 2024-07-03 NOTE — CARE COORDINATION
Case Management Assessment  Initial Evaluation    Date/Time of Evaluation: 7/3/2024 4:25 PM  Assessment Completed by: Carisa Mcqueen RN    If patient is discharged prior to next notation, then this note serves as note for discharge by case management.    Patient Name: Annie Beltran                   YOB: 1945  Diagnosis: CVA (cerebral vascular accident) (HCC) [I63.9]  History of carotid stenosis [Z86.79]                   Date / Time: 7/3/2024  1:31 PM    Patient Admission Status: Inpatient   Readmission Risk (Low < 19, Mod (19-27), High > 27): Readmission Risk Score: 10.5    Current PCP: Shankar Brown MD  PCP verified by CM? (P) Yes    Chart Reviewed: Yes      History Provided by: (P) Patient  Patient Orientation: (P) Alert and Oriented, Person, Place, Situation, Self    Patient Cognition: (P) Alert    Hospitalization in the last 30 days (Readmission):  No    If yes, Readmission Assessment in  Navigator will be completed.    Advance Directives:      Code Status: Full Code   Patient's Primary Decision Maker is: (P) Legal Next of Kin    Primary Decision Maker: Brandy Payton - Child - [phone]    Discharge Planning:    Patient lives with: (P) Alone Type of Home: (P) Apartment  Primary Care Giver: (P) Self  Patient Support Systems include: (P) Children, Family Members   Current Financial resources: (P) Medicare  Current community resources: (P) None  Current services prior to admission: (P) Durable Medical Equipment            Current DME: (P) Cane            Type of Home Care services:  (P) None    ADLS  Prior functional level: (P) Independent in ADLs/IADLs  Current functional level: (P) Assistance with the following:, Bathing, Dressing, Housework, Shopping, Mobility    PT AM-PAC:   /24  OT AM-PAC:   /24    Family can provide assistance at DC: (P) Yes  Would you like Case Management to discuss the discharge plan with any other family members/significant others, and if so, who? (P)

## 2024-07-03 NOTE — H&P
Neuro ICU History & Physical    Patient Name: Annie Beltran  Patient : 1945  Room/Bed: 0126/0126-01  Code Status: Full  Allergies:   Allergies   Allergen Reactions    Ciprofloxacin Swelling       CHIEF COMPLAINT     S/p Diagnostic Cerebral Angiogram     HPI    History Obtained From: Patient, electronic medical record     The patient is a 79 y.o. female with a history of Bell's palsy, previous ischemic stroke, hypertension, hyperlipidemia, ICA stenosis who presents to the neuro ICU status post diagnostic cerebral angiogram.  Patient was admitted to the hospital 3/2024 for anterior lumbar interbody fusion L4-S1 and removal of spinal cord stimulator, postoperatively patient was noted to have new onset left facial droop, underwent CTA head and neck which showed probable occlusion right common carotid artery and MRI at the time showed acute right parietal stroke.  Patient was seen outpatient by neuroendovascular and scheduled for carotid artery stenting of the right ICA.  Patient was taken for diagnostic cerebral angiogram and plan stenting today which revealed near complete occlusion of the right CCA/ICA and therefore did not undergo carotid artery stenting.  Following the procedure there was concern for bleeding from the groin puncture site and patient was transferred to the neuro ICU for close monitoring overnight.  On arrival to the neuro ICU patient is alert and oriented x 3 with no new focal neurological deficit, no active bleeding noted at the groin site.      Admitted to ICU From: IR  Reason for ICU Admission: S/p DSA       PATIENT HISTORY   Past Medical History:        Diagnosis Date    Acute hyponatremia 2021    Acute stroke due to ischemia ([facility]) 2024    Arthritis     Bell's palsy     Cerebral infarction, watershed distribution, unilateral, acute ([facility]) 2024    Hearing loss     Right, does not wear hearing aides    Hx of blood clots     PE    Hyperlipidemia      Hypertension     Lumbar disc disease     Seasonal allergies     Under care of service provider     PCP  DR. Shankar Brown MD/ maxine/ last seen 6-3-24    Under care of team     NeuroVasc Dr. Cotton/ danish/ last seen      Wears glasses        Past Surgical History:        Procedure Laterality Date    APPENDECTOMY      BACK SURGERY      ,     SECTION      COLONOSCOPY  2011    HUMERUS CLOSED REDUCTION Right 2021    HUMERUS CLOSED REDUCTION performed by Fabien Diana MD at [facility] OR    LUMBAR FUSION N/A 2024    ANTERIOR LUMBAR INTERBODY FUSION L4-S1 performed by Earl Verma DO at [facility] OR    LUMBAR FUSION N/A 2024    ANTERIOR APPROACH performed by Chandu Dior DO at [facility] OR    NERVE BLOCK LUMB FACET LEVEL 1 BILATERAL Right 2017    LUMBAR FACET L-4-5,L5-S1 performed by Kofi Hemphill MD at [facility] OR    PAIN MANAGEMENT PROCEDURE Right 2016    SI pain injection- Dr. Hemphill     PAIN MANAGEMENT PROCEDURE Right 2016    S1 RFA    PAIN MANAGEMENT PROCEDURE  2017    pain injection    PAIN MANAGEMENT PROCEDURE  2020    Pain/nerve stimulator    SHOULDER ARTHROPLASTY Right 10/18/2021    SHOULDER TOTAL ARTHROPLASTY-REVERSE performed by Fabien Diana MD at [facility] OR    SPINAL CORD STIMULATOR SURGERY      SPINE SURGERY N/A 2024    MIKE ROBOTIC POSTERIOR PERCUTANEOUS INSTRUMENTATION L4-S1, SPINAL CORD STIMULATOR BATTERY REMOVAL performed by Earl Verma DO at [facility] OR       Social History:   Social History     Socioeconomic History    Marital status:      Spouse name: Not on file    Number of children: Not on file    Years of education: Not on file    Highest education level: Not on file   Occupational History    Not on file   Tobacco Use    Smoking status: Never     Passive exposure: Past    Smokeless tobacco: Never   Vaping Use    Vaping Use: Never used   Substance and Sexual Activity    Alcohol use: Yes     Alcohol/week: 1.0  presents to the neuro ICU status post diagnostic cerebral angiogram.    Patient care will be discussed with attending, will reevaluate patient along with attending.     PLAN/MEDICAL DECISION MAKING:    NEUROLOGIC:  Right proximal CCA complete occlusion  History of right parietal ischemic stroke  - S/p diagnostic cerebral angiogram  - Neuroendovascular following  - Eliquis currently on hold, will restart tomorrow morning  - Continue aspirin and Plavix  - Goal -140  - Neurovascular checks per protocol  - Neuro checks per protocol    CARDIOVASCULAR:  History of hypertension, hyperlipidemia  - Continue home fenofibrate and Lipitor  - Goal -140  - Hydralazine and labetalol as needed  - Continue telemetry    PULMONARY:  - Oxygenating well on room air    RENAL/FLUID/ELECTROLYTE:  -Normal renal functioning  - Monitor intake and output  - Replace electrolytes PRN  - Daily BMP    GI/NUTRITION:  NUTRITION:  ADULT DIET; Regular  - Bowel regimen: GlycoLax as needed    ID:  - Afebrile  - Continue to monitor for fevers  - Daily CBC    HEME:   - POC hematocrit 36  - Daily CBC    ENDOCRINE:  - Continue to monitor blood glucose, goal <180    OTHER:  - PT/OT/ST       DVT PROPHYLAXIS:  - SCD sleeves - Thigh High   -Aspirin  -Plavix      DISPOSITION: Admitted to neuro ICU        PRAKASH Oswald - CNP  Neuro Critical Care Service   Pager [phone]  7/3/2024     3:59 PM

## 2024-07-04 VITALS
OXYGEN SATURATION: 94 % | HEIGHT: 60 IN | RESPIRATION RATE: 16 BRPM | TEMPERATURE: 98.3 F | HEART RATE: 84 BPM | DIASTOLIC BLOOD PRESSURE: 102 MMHG | SYSTOLIC BLOOD PRESSURE: 118 MMHG | WEIGHT: 130 LBS | BODY MASS INDEX: 25.52 KG/M2

## 2024-07-04 PROBLEM — I63.239 CEREBROVASCULAR ACCIDENT (CVA) DUE TO STENOSIS OF CAROTID ARTERY (HCC): Status: ACTIVE | Noted: 2024-04-05

## 2024-07-04 LAB
ANION GAP SERPL CALCULATED.3IONS-SCNC: 10 MMOL/L (ref 9–16)
BASOPHILS # BLD: <0.03 K/UL (ref 0–0.2)
BASOPHILS NFR BLD: 0 % (ref 0–2)
BUN SERPL-MCNC: 16 MG/DL (ref 8–23)
CALCIUM SERPL-MCNC: 8.7 MG/DL (ref 8.6–10.4)
CHLORIDE SERPL-SCNC: 99 MMOL/L (ref 98–107)
CO2 SERPL-SCNC: 22 MMOL/L (ref 20–31)
CREAT SERPL-MCNC: 0.9 MG/DL (ref 0.5–0.9)
EOSINOPHIL # BLD: <0.03 K/UL (ref 0–0.44)
EOSINOPHILS RELATIVE PERCENT: 1 % (ref 1–4)
ERYTHROCYTE [DISTWIDTH] IN BLOOD BY AUTOMATED COUNT: 14.6 % (ref 11.8–14.4)
GFR, ESTIMATED: 67 ML/MIN/1.73M2
GLUCOSE SERPL-MCNC: 99 MG/DL (ref 74–99)
HCT VFR BLD AUTO: 29.4 % (ref 36.3–47.1)
HGB BLD-MCNC: 9.7 G/DL (ref 11.9–15.1)
IMM GRANULOCYTES # BLD AUTO: 0.04 K/UL (ref 0–0.3)
IMM GRANULOCYTES NFR BLD: 1 %
LYMPHOCYTES NFR BLD: 0.77 K/UL (ref 1.1–3.7)
LYMPHOCYTES RELATIVE PERCENT: 22 % (ref 24–43)
MCH RBC QN AUTO: 30.2 PG (ref 25.2–33.5)
MCHC RBC AUTO-ENTMCNC: 33 G/DL (ref 28.4–34.8)
MCV RBC AUTO: 91.6 FL (ref 82.6–102.9)
MONOCYTES NFR BLD: 0.78 K/UL (ref 0.1–1.2)
MONOCYTES NFR BLD: 23 % (ref 3–12)
NEUTROPHILS NFR BLD: 53 % (ref 36–65)
NEUTS SEG NFR BLD: 1.83 K/UL (ref 1.5–8.1)
NRBC BLD-RTO: 0 PER 100 WBC
PLATELET # BLD AUTO: 177 K/UL (ref 138–453)
PMV BLD AUTO: 8.9 FL (ref 8.1–13.5)
POTASSIUM SERPL-SCNC: 4 MMOL/L (ref 3.7–5.3)
RBC # BLD AUTO: 3.21 M/UL (ref 3.95–5.11)
RBC # BLD: ABNORMAL 10*6/UL
SODIUM SERPL-SCNC: 131 MMOL/L (ref 136–145)
WBC OTHER # BLD: 3.5 K/UL (ref 3.5–11.3)

## 2024-07-04 PROCEDURE — 2580000003 HC RX 258: Performed by: ANESTHESIOLOGY

## 2024-07-04 PROCEDURE — 85025 COMPLETE CBC W/AUTO DIFF WBC: CPT

## 2024-07-04 PROCEDURE — 6370000000 HC RX 637 (ALT 250 FOR IP)

## 2024-07-04 PROCEDURE — 99239 HOSP IP/OBS DSCHRG MGMT >30: CPT | Performed by: PSYCHIATRY & NEUROLOGY

## 2024-07-04 PROCEDURE — 99232 SBSQ HOSP IP/OBS MODERATE 35: CPT | Performed by: STUDENT IN AN ORGANIZED HEALTH CARE EDUCATION/TRAINING PROGRAM

## 2024-07-04 PROCEDURE — 2580000003 HC RX 258: Performed by: STUDENT IN AN ORGANIZED HEALTH CARE EDUCATION/TRAINING PROGRAM

## 2024-07-04 PROCEDURE — 80048 BASIC METABOLIC PNL TOTAL CA: CPT

## 2024-07-04 PROCEDURE — 94761 N-INVAS EAR/PLS OXIMETRY MLT: CPT

## 2024-07-04 RX ADMIN — APIXABAN 5 MG: 5 TABLET, FILM COATED ORAL at 07:59

## 2024-07-04 RX ADMIN — SODIUM CHLORIDE, PRESERVATIVE FREE 10 ML: 5 INJECTION INTRAVENOUS at 08:00

## 2024-07-04 RX ADMIN — ATORVASTATIN CALCIUM 10 MG: 10 TABLET, FILM COATED ORAL at 07:59

## 2024-07-04 RX ADMIN — ASPIRIN 81 MG: 81 TABLET, COATED ORAL at 07:59

## 2024-07-04 RX ADMIN — SODIUM CHLORIDE, PRESERVATIVE FREE 10 ML: 5 INJECTION INTRAVENOUS at 07:59

## 2024-07-04 RX ADMIN — CLOPIDOGREL BISULFATE 75 MG: 75 TABLET ORAL at 07:59

## 2024-07-04 RX ADMIN — FENOFIBRATE 160 MG: 160 TABLET ORAL at 08:00

## 2024-07-04 ASSESSMENT — PAIN SCALES - GENERAL
PAINLEVEL_OUTOF10: 0

## 2024-07-04 NOTE — PROGRESS NOTES
Endovascular Neurosurgery progress note      Reason for evaluation: R CCA stenosis    SUBJECTIVE:     Doing well.  The procedure was aborted due to complete occlusion of the right CCA.  The patient is okay to be discharged        History of Chief Complaint 3/29/2024:      79 y.o. female with right-sided Bell's palsy for 40 years, presbycusis, hyperlipidemia, hypertension, and chronic history of low back pain with remote lumbar surgery in the 1970s status post revision, status post DCM stimulator was admitted on 3/29/2024 for an anterior lumbar interbody fusion at L4 S1 as well as removal of her spinal cord stimulator.      On postop day 2.  At approximately 12 PM on 3/31/2024, patient's nurse noticed a new onset left facial droop.  Stroke alert was called at approximately 12:50 PM.  Upon arrival, patient's  and glucose 176.  On examination, patient had horizontal nystagmus as well as left upper extremity dysmetria with finger-nose-finger testing.  No obvious weakness was appreciated on examination.  No other focal deficits besides a chronic right facial weakness secondary to a remote right Bell's palsy.     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  MRI showed an acute right parietal stroke    Allergies  is allergic to ciprofloxacin.  Medications  Prior to Admission medications    Medication Sig Start Date End Date Taking? Authorizing Provider   fenofibrate (TRICOR) 145 MG tablet Take 1 tablet by mouth daily   Yes Geronimo Brower MD   acetaminophen (TYLENOL) 500 MG tablet Take 2 tablets by mouth every 6 hours as needed for Pain   Yes Geronimo Brower MD   apixaban (ELIQUIS) 5 MG TABS tablet Take 1 tablet by mouth 2 times daily 6/24/24   Venancio Ferguson MD   simvastatin (ZOCOR) 20 MG tablet Take 1 tablet by mouth daily    ProviderGeronimo MD   aspirin 81 MG EC tablet Take 1 tablet by mouth daily 5/31/24   Alex Oneill MD      On the 3/31/2024, patient was noted to have left face droop. 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 MRI showed an acute right parietal stroke.  Repeat CT PE confirms the presence of PE    The procedure was aborted due to chronically occluded right CCA    PLAN:     -Continue with Eliquis for PE  -Baby aspirin for the chronically occluded right CCA    Follow-up with the neuroendovascular clinic in 6 to 8 weeks      Okay to be discharged            Case discussed  with Dr. Cotton attending.    Venancio Joyce MD  Stroke, Neurocritical Care & Neurointervention  [facility]  [facility]roke [facility]  [facility] [place]  Electronically signed 7/4/2024 at 12:49 PM

## 2024-07-04 NOTE — PROGRESS NOTES
Daily Progress Note  Neuro Critical Care    Patient Name: Annie Beltran  Patient : 1945  Room/Bed: 0126/0126-01  Code Status: Full  Allergies:   Allergies   Allergen Reactions    Ciprofloxacin Swelling       CHIEF COMPLAINT:      S/p Diagnostic Cerebral Angiogram      INTERVAL HISTORY    Initial Presentation (Admitted 7/3/2024):  The patient is a 79 y.o. female with a history of Bell's palsy, previous ischemic stroke, hypertension, hyperlipidemia, ICA stenosis who presents to the neuro ICU status post diagnostic cerebral angiogram.  Patient was admitted to the hospital 3/2024 for anterior lumbar interbody fusion L4-S1 and removal of spinal cord stimulator, postoperatively patient was noted to have new onset left facial droop, underwent CTA head and neck which showed probable occlusion right common carotid artery and MRI at the time showed acute right parietal stroke.  Patient was seen outpatient by neuroendovascular and scheduled for carotid artery stenting of the right ICA.  Patient was taken for diagnostic cerebral angiogram and plan stenting today which revealed near complete occlusion of the right CCA/ICA and therefore did not undergo carotid artery stenting.  Following the procedure there was concern for bleeding from the groin puncture site and patient was transferred to the neuro ICU for close monitoring overnight.  On arrival to the neuro ICU patient is alert and oriented x 3 with no new focal neurological deficit, no active bleeding noted at the groin site.      Hospital Course:   7/3: Per HPI     Interval Events:   No acute events overnight.  Neurologically intact on examination. Groin site has mild ecchymosis and is soft/ non tender. Home Eliquis restarted. Plan for discharge home today if patient is able to ambulate without assistance.     CURRENT MEDICATIONS:  SCHEDULED MEDICATIONS:   sodium chloride flush  5-40 mL IntraVENous 2 times per day    sodium chloride flush  5-40 mL IntraVENous 2   Level of consciousness:  0 - alert; keenly responsive  1b.  Level of consciousness questions:  0 - answers both questions correctly  1c.  Level of consciousness questions:  0 - performs both tasks correctly  2.    Best Gaze:  0 - normal  3.    Visual:  0 - no visual loss  4.    Facial Palsy:  0 - normal symmetric movement  5a.  Motor left arm:  0 - no drift, limb holds 90 (or 45) degrees for full 10 seconds  5b.  Motor right arm:  0 - no drift, limb holds 90 (or 45) degrees for full 10 seconds  6a.  Motor left le - no drift; leg holds 30 degree position for full 5 seconds  6b.  Motor right le - no drift; leg holds 30 degree position for full 5 seconds  7.    Limb Ataxia:  0 - absent  8.    Sensory:  0 - normal; no sensory loss  9.    Best Language:  0 - no aphasia, normal  10.  Dysarthria:  0 - normal  11.  Extinction and Inattention:  0 - no abnormality  TOTAL:     DRAINS:  [] There are no drains for Neuro Critical Care to monitor at this time.     ASSESSMENT AND PLAN:       This is a 79 y.o. female with a history of Bell's palsy, previous ischemic stroke, hypertension, hyperlipidemia, ICA stenosis who presents to the neuro ICU status post diagnostic cerebral angiogram.      NEUROLOGIC:  Right proximal CCA complete occlusion  History of right parietal ischemic stroke  - S/p diagnostic cerebral angiogram  - Neuroendovascular following  - Restart home Eliquis   - Continue aspirin  - Stop Plavix per pao endovascular   - Goal -160  - Neurovascular checks per protocol  - Neuro checks per protocol    CARDIOVASCULAR:  History of hypertension, hyperlipidemia  - Continue home fenofibrate and Lipitor  - Goal -160  - Hydralazine and labetalol as needed  - Continue telemetry    PULMONARY:  History of PE   - Restarted home Eliquis   - Oxygenating well on room air    RENAL/FLUID/ELECTROLYTE:  - BUN 16/ Creatinine 0.9  - Replace electrolytes PRN  - Daily BMP    GI/NUTRITION:  NUTRITION:  ADULT DIET;

## 2024-07-04 NOTE — DISCHARGE SUMMARY
Neuro Critical Care   Discharge Summary      PATIENT NAME: Annie Beltran  YOB: 1945  MEDICAL RECORD NO. 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    Status post lumbar spinal fusion Z98.1    Status post lumbar spine operation Z98.890    Post-op pain G89.18    Other pulmonary embolism without acute cor pulmonale ([facility]) I26.99    Dyslipidemia E78.5    Essential hypertension I10    Cerebrovascular accident (CVA) due to stenosis of carotid artery ([facility]) I63.239    History of cerebrovascular accident (CVA) from right carotid artery occlusion involving right middle cerebral artery territory Z86.73    Right-sided extracranial carotid artery occlusion I65.21    History of carotid stenosis Z86.79       HOSPITAL COURSE     Annie Beltran is a 79 y.o. yo female who presented to [facility] on 7/3/2024  1:31 PM status post diagnostic cerebral angiogram.  Patient was admitted to the hospital 3/2024 for anterior lumbar interbody fusion L4-S1 and removal of spinal cord stimulator, postoperatively patient was noted to have new onset left facial droop, underwent CTA head and neck which showed probable occlusion right common carotid artery and MRI at the time showed acute right parietal stroke. Patient was seen outpatient by neuroendovascular and scheduled for carotid artery stenting of the right ICA. Patient was taken for diagnostic cerebral angiogram and plan stenting today which revealed near complete occlusion of the right    HEAD:  normocephalic, atraumatic    EYES:  PERRLA, EOMI.   ENT:  moist mucous membranes   NECK:  supple, symmetric   LUNGS:  Equal air entry bilaterally   CARDIOVASCULAR:  normal s1 / s2, RRR, distal pulses intact   ABDOMEN:  Soft, no rigidity   NEUROLOGIC:  Mental Status:  {MS AMB PSYCH MSE ALERTNESS:22872},{ALERTNESS:401888138::\"awake\"}             Cranial Nerves:    {CRANIAL NERVES:677811410}    Motor Exam:    Drift:  {ABSENT/PRESENT:004996808::\"absent\"}  Tone:  {NORMAL/ABNORMAL:537488980::\"normal\"}    {MOTOR:928065054}    Sensory:    Touch:    Right Upper Extremity:  {NORMAL/ABNORMAL:425413982::\"normal\"}  Left Upper Extremity:  {NORMAL/ABNORMAL:575425826::\"normal\"}  Right Lower Extremity:  {NORMAL/ABNORMAL:612838454::\"normal\"}  Left Lower Extremity:  {NORMAL/ABNORMAL:258451709::\"normal\"}    Deep Tendon Reflexes:    Right Bicep:  {GRADE:402050254}  Left Bicep:  {GRADE:756605087}  Right Knee:  {GRADE:311265839}  Left Knee:  {GRADE:979376112}    Plantar Response:  Right:  {PLANTAR RESPONSE:273617574}  Left:  {PLANTAR RESPONSE:540803988}    Clonus:  {DESC; PRESENT/ABSENT:71892::\"N/A\"}  Ramos's:  {DESC; PRESENT/ABSENT:14014::\"N/A\"}    Coordination/Dysmetria:  Heel to Shin:  {HEEL-KNEE-SHIN:333099356}  Finger to Nose:   {FINGER/NOSE:183097688}   Dysdiadochokinesia:  {DESC; PRESENT/ABSENT:98653::\"N/A\"}    Gait:  {PE GAIT:268507}   NIH Stroke Scale Total (if not done complete detailed one below):    1a.  Level of consciousness:  {LEVEL OF CONSCIOUSNESS:755472477}  1b.  Level of consciousness questions:  {QUESTIONS:672472259}  1c.  Level of consciousness questions:  {COMMANDS:611158091}  2.    Best Gaze:  {BEST GAZE:334393761}  3.    Visual:  {VISUAL:604089882}  4.    Facial Palsy:  {FACIAL PALSY:190595757}  5a.  Motor left arm:  {MOTOR-ARM:489830055}  5b.  Motor right arm:  {MOTOR-ARM:640683729}  6a.  Motor left leg:  {MOTOR-LE}  6b.  Motor right leg:  {MOTOR-LE}  7.    Limb Ataxia:  {LIMB  detailed one below):    1a.  Level of consciousness:  0 - alert; keenly responsive  1b.  Level of consciousness questions:  0 - answers both questions correctly  1c.  Level of consciousness questions:  0 - performs both tasks correctly  2.    Best Gaze:  0 - normal  3.    Visual:  0 - no visual loss  4.    Facial Palsy:  0 - normal symmetric movement  5a.  Motor left arm:  0 - no drift, limb holds 90 (or 45) degrees for full 10 seconds  5b.  Motor right arm:  0 - no drift, limb holds 90 (or 45) degrees for full 10 seconds  6a.  Motor left le - no drift; leg holds 30 degree position for full 5 seconds  6b.  Motor right le - no drift; leg holds 30 degree position for full 5 seconds  7.    Limb Ataxia:  0 - absent  8.    Sensory:  0 - normal; no sensory loss  9.    Best Language:  0 - no aphasia, normal  10.  Dysarthria:  0 - normal  11.  Extinction and Inattention:  0 - no abnormality  TOTAL: 0      LABS/IMAGING     Recent Labs     24  0903 24  0758   WBC  --  3.5   HGB  --  9.7*   HCT  --  29.4*   PLT  --  177   NA  --  131*   K  --  4.0   CL  --  99   CO2  --  22   BUN  --  16   CREATININE 1.1 0.9       No results found.      DISCHARGE INSTRUCTIONS     Discharge Medications:        Medication List        CHANGE how you take these medications      acetaminophen 500 MG tablet  Commonly known as: TYLENOL  What changed: Another medication with the same name was removed. Continue taking this medication, and follow the directions you see here.     apixaban 5 MG Tabs tablet  Commonly known as: ELIQUIS  Take 1 tablet by mouth 2 times daily  What changed: additional instructions            CONTINUE taking these medications      aspirin 81 MG EC tablet  Take 1 tablet by mouth daily     ASTEPRO NA     fenofibrate 145 MG tablet  Commonly known as: TRICOR     lisinopril-hydroCHLOROthiazide 20-25 MG per tablet  Commonly known as: PRINZIDE;ZESTORETIC     simvastatin 20 MG tablet  Commonly known as: ZOCOR

## 2024-07-04 NOTE — PROGRESS NOTES
Pt given discharge instructions with daughter at bedside.  Pt educated to take medications as prescribed and go to follow up appointments as directed. Pt educated that if site begins to have increased bruising, swelling, numbness and tingling to seek medical attention. All questions answered in full. Pt wheeled to main entrance of hospital per RN. Pt discharged safely with all personal belongings.

## 2024-07-04 NOTE — PLAN OF CARE
Problem: Chronic Conditions and Co-morbidities  Goal: Patient's chronic conditions and co-morbidity symptoms are monitored and maintained or improved  7/4/2024 1404 by Penny Wang RN  Outcome: Completed  7/4/2024 0011 by Buddy Henderson RN  Outcome: Progressing     Problem: Pain  Goal: Verbalizes/displays adequate comfort level or baseline comfort level  7/4/2024 1404 by Penny Wang RN  Outcome: Completed  7/4/2024 0011 by Buddy Henderson RN  Outcome: Progressing     Problem: Discharge Planning  Goal: Discharge to home or other facility with appropriate resources  7/4/2024 1404 by Penny Wang RN  Outcome: Completed  7/4/2024 0011 by Buddy Henderson RN  Outcome: Progressing     Problem: Safety - Adult  Goal: Free from fall injury  7/4/2024 1404 by Penny Wang RN  Outcome: Completed  7/4/2024 0011 by Buddy Henderson RN  Outcome: Progressing

## 2024-07-04 NOTE — ANESTHESIA POSTPROCEDURE EVALUATION
Department of Anesthesiology  Postprocedure Note    Patient: Annie Beltran  MRN: [id number]  YOB: 1945  Date of evaluation: 7/4/2024    Procedure Summary       Date: 07/03/24 Room / Location: [facility] Special Procedures    Anesthesia Start: 1124 Anesthesia Stop: 1326    Procedure: IR ANGIOGRAM CAROTID CEREBRAL BILATERAL Diagnosis:       Cerebrovascular accident (CVA) due to stenosis of carotid artery, unspecified blood vessel laterality (HCC)      CVA (cerebral vascular accident) (HCC)      History of carotid stenosis    Scheduled Providers: Alona Knight APRN - CRNA; Brooke Barrett MD Responsible Provider: Eren Bowling MD    Anesthesia Type: MAC, general ASA Status: 3            Anesthesia Type: No value filed.    David Phase I:      David Phase II:      Anesthesia Post Evaluation    Patient location during evaluation: PACU  Patient participation: complete - patient participated  Level of consciousness: awake  Airway patency: patent  Nausea & Vomiting: no nausea and no vomiting  Cardiovascular status: blood pressure returned to baseline  Respiratory status: acceptable  Hydration status: euvolemic  Comments: BP (!) 118/102   Pulse 84   Temp 98.3 °F (36.8 °C) (Oral)   Resp 16   Ht 1.524 m (5')   Wt 59 kg (130 lb)   SpO2 94%   BMI 25.39 kg/m²   Pain Assessment: None - Denies Pain Pain Level: 0        No notable events documented.

## 2024-07-04 NOTE — PLAN OF CARE
Problem: Chronic Conditions and Co-morbidities  Goal: Patient's chronic conditions and co-morbidity symptoms are monitored and maintained or improved  7/4/2024 0011 by Buddy Henderson RN  Outcome: Progressing  7/3/2024 1851 by Penny Wang RN  Outcome: Progressing     Problem: Pain  Goal: Verbalizes/displays adequate comfort level or baseline comfort level  7/4/2024 0011 by Buddy Henderson RN  Outcome: Progressing  7/3/2024 1851 by Penny Wang RN  Outcome: Progressing     Problem: Discharge Planning  Goal: Discharge to home or other facility with appropriate resources  7/4/2024 0011 by Buddy Henderson RN  Outcome: Progressing  7/3/2024 1851 by Penny Wang RN  Outcome: Progressing     Problem: Safety - Adult  Goal: Free from fall injury  Outcome: Progressing

## 2024-07-24 ENCOUNTER — TELEPHONE (OUTPATIENT)
Dept: NEUROLOGY | Age: 79
End: 2024-07-24

## 2024-07-24 NOTE — TELEPHONE ENCOUNTER
Pt called and stated she wanting to know if she can take one tablet daily instead of 1 tablet 2 times daily? Pt states the eliquis makes her fatique taking it 2 times daily. Please advise.

## 2024-10-30 ENCOUNTER — OFFICE VISIT (OUTPATIENT)
Dept: VASCULAR SURGERY | Age: 79
End: 2024-10-30

## 2024-10-30 VITALS
HEART RATE: 77 BPM | DIASTOLIC BLOOD PRESSURE: 74 MMHG | HEIGHT: 60 IN | BODY MASS INDEX: 28.35 KG/M2 | WEIGHT: 144.4 LBS | SYSTOLIC BLOOD PRESSURE: 135 MMHG | TEMPERATURE: 96.5 F

## 2024-10-30 DIAGNOSIS — I65.21 RIGHT CAROTID ARTERY OCCLUSION: ICD-10-CM

## 2024-10-30 DIAGNOSIS — Z86.711 HX OF PULMONARY EMBOLUS: Primary | ICD-10-CM

## 2024-10-30 RX ORDER — LISINOPRIL 30 MG/1
15 TABLET ORAL DAILY
COMMUNITY
Start: 2024-10-16

## 2024-10-30 RX ORDER — HYDROCODONE BITARTRATE AND ACETAMINOPHEN 5; 325 MG/1; MG/1
1 TABLET ORAL 2 TIMES DAILY PRN
COMMUNITY
Start: 2024-10-24

## 2024-10-30 NOTE — PROGRESS NOTES
Annie Beltran was seen on 10/30/2024 for   Chief Complaint   Patient presents with    Carotid artery stenosis     Patient reports had a stroke during back surgery March 2024. States had unsuccessful carotid surgery July 2024.                               REVIEW OF SYSTEMS    Constitutional Weight: absent, A, Fatigue: present Fever: absent   HEENT Ears: normal,Visual disturbance: absent Sore throat: absent,    Respiratory Shortness of breath: absent, Cough: absent;, Snoring: absent   Cardivascular Chest pain: absent,  Leg pain: absent,Leg swelling:absent, Non-healing wound:absent    GI Diarrhea: absent, Abdominal Pain: absent    Urinary frequency: absent, Urinary incontinence: absent   Musculoskeletal Neck pain: absent, Back pain: present, Restless Leg:absent     Dermatological Hair loss: absent, Skin changes: absent   Neurological  Sudden Loss of Vision in one eye:absent, Slurred Speech:absent, Weakness on one side of body: absent,Headache: absent   Psychiatric Anxiety: absent, Depression: absent   Hematologic Abnormal bleeding: absent,

## 2024-10-30 NOTE — PROGRESS NOTES
Annie Beltran was seen on 10/30/2024 for   Chief Complaint   Patient presents with    Carotid artery stenosis     Patient reports had a stroke during back surgery March 2024. States had unsuccessful carotid surgery July 2024.   .  10/30/24 1st [facility] visit. Referred by Dr Brown. Comes with friend Thais who also lives at [facility]. In March she was having nonvascular surgery at [facility] and post op had speech changes. She was found on CTA to have a probable occlusion of RCCA. MRI showed parietal stroke. On 3/31/24 a CTA chest was performed after a rapid response was called. This showed PE versus artifact. Venous duplex 4/2/24 bilat showed no clot. Takes Eliquis bid. She has no other indication for a/c. She also takes asa. Based on the March presentation she was scheduled for CARLITO stent 7/3/24 but angio demonstrated near total occlusion of RCCA, CARLITO, so the procedure was abandoned.   Had Bell's at age 38 and never completely recovered. She feels her speech has normalized, but kids disagree. (Had 4 kids, 3 still living). She also notices some impairment in walking due to left leg.   No chest pain.     Social History     Socioeconomic History    Marital status:      Spouse name: Not on file    Number of children: Not on file    Years of education: Not on file    Highest education level: Not on file   Occupational History    Not on file   Tobacco Use    Smoking status: Never     Passive exposure: Past    Smokeless tobacco: Never   Vaping Use    Vaping status: Never Used   Substance and Sexual Activity    Alcohol use: Yes     Alcohol/week: 1.0 standard drink of alcohol     Types: 1 Cans of beer per week     Comment: weekly    Drug use: No    Sexual activity: Not on file   Other Topics Concern    Not on file   Social History Narrative    Not on file     Social Determinants of Health     Financial Resource Strain: Not on file   Food Insecurity: No Food Insecurity (3/29/2024)    Hunger Vital Sign

## 2024-10-30 NOTE — PATIENT INSTRUCTIONS
SURVEY:    Thank you for allowing us to care for you today.    You may be receiving a survey from [facility] regarding your visit today- electronically or via mail.      Please help us by completing the survey as this will provide the needed feedback to ensure we are providing the very best care for you and your family.    If you cannot score us a very good on any question, please call the office to discuss how we could have made your experience a very good one.    Thank you.       STAFF:    Mandi Coronado, Rebecca Arriaga, Xi Gillis      CLINICAL STAFF:    Edie De Los Santos LPN, Lolita Riley LPN, Erica Dinh LPN, Renata Stratton CMA

## 2025-03-10 NOTE — PROGRESS NOTES
Difficulty-A-OH     Writer to patients bedside at this time due to patient's friend stating she is \"throwing up. \" Patient vomited moderate amount of tan, undigested food emesis into basin. Patient states she \"felt nauseous all of a sudden and then got sick. \" Patient given PRN zofran. Will continue to monitor.

## 2025-03-23 ENCOUNTER — TRANSCRIBE ORDERS (OUTPATIENT)
Dept: ADMINISTRATIVE | Age: 80
End: 2025-03-23

## 2025-03-23 DIAGNOSIS — I26.99 MULTIPLE PULMONARY EMBOLI (HCC): Primary | ICD-10-CM

## 2025-04-08 ENCOUNTER — HOSPITAL ENCOUNTER (OUTPATIENT)
Age: 80
Discharge: HOME | End: 2025-04-08
Payer: MEDICARE

## 2025-04-08 ENCOUNTER — HOSPITAL ENCOUNTER (OUTPATIENT)
Dept: CT IMAGING | Age: 80
Discharge: HOME | End: 2025-04-10
Payer: MEDICARE

## 2025-04-08 DIAGNOSIS — I26.99 MULTIPLE PULMONARY EMBOLI (HCC): ICD-10-CM

## 2025-04-08 LAB
CREAT SERPL-MCNC: 0.9 MG/DL (ref 0.5–0.9)
GFR, ESTIMATED: 62 ML/MIN/1.73M2

## 2025-04-08 PROCEDURE — 71260 CT THORAX DX C+: CPT

## 2025-04-08 PROCEDURE — 36415 COLL VENOUS BLD VENIPUNCTURE: CPT

## 2025-04-08 PROCEDURE — 6360000004 HC RX CONTRAST MEDICATION

## 2025-04-08 PROCEDURE — 82565 ASSAY OF CREATININE: CPT

## 2025-04-08 RX ORDER — IOPAMIDOL 755 MG/ML
75 INJECTION, SOLUTION INTRAVASCULAR
Status: COMPLETED | OUTPATIENT
Start: 2025-04-08 | End: 2025-04-08

## 2025-04-08 RX ADMIN — IOPAMIDOL 75 ML: 755 INJECTION, SOLUTION INTRAVENOUS at 09:15

## (undated) DEVICE — BLADE ES L6IN ELASTOMERIC COAT EXT DURABLE BEND UPTO 90DEG

## (undated) DEVICE — SUTURE PERMAHAND SZ 2-0 L30IN NONABSORBABLE BLK SILK W/O A305H

## (undated) DEVICE — BIT DRL DIA2.7MM PERIPH SCR REUSE FOR COMPHSVE REV SHLDR

## (undated) DEVICE — PAD,NON-ADHERENT,3X8,STERILE,LF,1/PK: Brand: MEDLINE

## (undated) DEVICE — COVER,MAYO STAND,STERILE: Brand: MEDLINE

## (undated) DEVICE — NEEDLE HYPO 27GA L1.25IN GRY POLYPR HUB S STL REG BVL STR

## (undated) DEVICE — LINE AUTOTRNS ASPIR ANTICOAG CELL SAVR 5 + SYS 00208] HAEMONETICS CORP]

## (undated) DEVICE — BLADE,CARBON-STEEL,15,STRL,DISPOSABLE,TB: Brand: MEDLINE

## (undated) DEVICE — RESERVOIR DRNGE 300ML CANSTR FOR CLOSE WND C J-VAC

## (undated) DEVICE — SHEET, DRAPE, SPLIT, STERILE: Brand: MEDLINE

## (undated) DEVICE — LIQUIBAND RAPID ADHESIVE 36/CS 0.8ML: Brand: MEDLINE

## (undated) DEVICE — 3M™ IOBAN™ 2 ANTIMICROBIAL INCISE DRAPE 6650EZ: Brand: IOBAN™ 2

## (undated) DEVICE — CORD ES L12FT BPLR FRCP

## (undated) DEVICE — GLOVE SURG SZ 75 L12IN FNGR THK94MIL STD WHT LTX FREE

## (undated) DEVICE — SOLUTION IRRIG 1000ML 0.9% SOD CHL USP POUR PLAS BTL

## (undated) DEVICE — PROTECTOR EYE PT SELF ADH NS OPT GRD LF

## (undated) DEVICE — SUTURE VCRL SZ 3 0 L36IN ABSRB UD CT L40MM 1 2 CIR TAPR PNT VCP956H

## (undated) DEVICE — PAD GEN USE BORDERED ADH 14IN 2IN AND 12IN 4IN GZ UNIV ST

## (undated) DEVICE — STRAP ARMBRD W1.5XL32IN FOAM STR YET SFT W/ HK AND LOOP

## (undated) DEVICE — Z DISCONTINUED USE 2220295 SUTURE VICRYL SZ 0 L18IN ABSRB UD L36MM CT-1 1/2 CIR J840D

## (undated) DEVICE — ULTRACLEAN ACCESSORY ELECTRODE 4" (10.16 CM) COATED BLADE: Brand: ULTRACLEAN

## (undated) DEVICE — DRAPE,REIN 53X77,STERILE: Brand: MEDLINE

## (undated) DEVICE — SUTURE VICRYL + SZ 0 L27IN ABSRB UD CT-1 L36MM 1/2 CIR TAPR VCP260H

## (undated) DEVICE — GOWN,SIRUS,NONRNF,SETINSLV,XL,20/CS: Brand: MEDLINE

## (undated) DEVICE — STRAP,POSITIONING,KNEE/BODY,FOAM,4X60": Brand: MEDLINE

## (undated) DEVICE — GLOVE SURG SZ 75 L12IN FNGR THK87MIL DK GRN LTX FREE ISOLEX

## (undated) DEVICE — SOLUTION IV IRRIG POUR BRL 0.9% SODIUM CHL 2F7124

## (undated) DEVICE — GARMENT,MEDLINE,DVT,INT,CALF,MED, GEN2: Brand: MEDLINE

## (undated) DEVICE — SVMMC ORTH SPINE PK

## (undated) DEVICE — GLOVE ORANGE PI 7 1/2   MSG9075

## (undated) DEVICE — Device

## (undated) DEVICE — C-ARMOR C-ARM EQUIPMENT COVERS CLEAR STERILE UNIVERSAL FIT 12 PER CASE: Brand: C-ARMOR

## (undated) DEVICE — PROTECTIVE ARM SLEEVES: Brand: DEROYAL

## (undated) DEVICE — SOLUTION IV IRRIG 0.9% NACL 3000ML BAG 2B7477

## (undated) DEVICE — SUTURE PDS II SZ 0 L60IN ABSRB VLT L65MM TP-1 1/2 CIR Z991G

## (undated) DEVICE — SPONGE GZ W4XL4IN COT 12 PLY TYP VII WVN C FLD DSGN

## (undated) DEVICE — FAN SPRAY KIT: Brand: PULSAVAC®

## (undated) DEVICE — MARKER,SKIN,WI/RULER AND LABELS: Brand: MEDLINE

## (undated) DEVICE — APPLICATOR MEDICATED 26 CC SOLUTION HI LT ORNG CHLORAPREP

## (undated) DEVICE — THE STERILE LIGHT HANDLE COVER IS USED WITH STERIS SURGICAL LIGHTING AND VISUALIZATION SYSTEMS.

## (undated) DEVICE — BOWL SET COLL HI VOL AUTOTRANFUSION CELL SAVR

## (undated) DEVICE — LOOP VES W25MM THK1MM MAXI RED SIL FLD REPELLENT 100 PER

## (undated) DEVICE — PROTECTOR ULN NRV PUR FOAM HK LOOP STRP ANATOMICALLY

## (undated) DEVICE — TUBING AMB

## (undated) DEVICE — SUTURE MONOCRYL SZ 4-0 L18IN ABSRB UD L16MM PC-3 3/8 CIR PRIM Y845G

## (undated) DEVICE — 3.2MM X 18.3MM METAL CUTTING HELIOCOIDAL RASP

## (undated) DEVICE — 3M™ TEGADERM™ TRANSPARENT FILM DRESSING FRAME STYLE, 1629, 8 IN X 12 IN (20 CM X 30 CM), 10/CT 8CT/CASE: Brand: 3M™ TEGADERM™

## (undated) DEVICE — DRESSING TRNSPAR W5XL4.5IN FLM SHT SEMIPERMEABLE WIND

## (undated) DEVICE — SUTURE MCRYL SZ 4-0 L27IN ABSRB UD L19MM PS-2 1/2 CIR PRIM Y426H

## (undated) DEVICE — NEPTUNE E-SEP SMOKE EVACUATION PENCIL, COATED, 70MM BLADE, PUSH BUTTON SWITCH: Brand: NEPTUNE E-SEP

## (undated) DEVICE — SHEET,DRAPE,70X100,STERILE: Brand: MEDLINE

## (undated) DEVICE — SUTURE PERMAHAND SZ 3-0 L30IN NONABSORBABLE BLK SILK BRAID A304H

## (undated) DEVICE — GLOVE ORANGE PI 8   MSG9080

## (undated) DEVICE — GLOVE ORANGE PI 8 1/2   MSG9085

## (undated) DEVICE — SPONGE,LAP,4"X18",XR,ST,5/PK,40PK/CS: Brand: MEDLINE INDUSTRIES, INC.

## (undated) DEVICE — GOWN,AURORA,NONREINFORCED,LARGE: Brand: MEDLINE

## (undated) DEVICE — 1LYRTR 16FR10ML100%SIL UMS SNP: Brand: MEDLINE INDUSTRIES, INC.

## (undated) DEVICE — SYSTEN PATIENT SPECIFIC UNID TECHNOLOGY

## (undated) DEVICE — C-ARM: Brand: UNBRANDED

## (undated) DEVICE — SOLUTION IRRIG 1000ML STRL H2O USP PLAS POUR BTL

## (undated) DEVICE — Z DISCONTINUED BY MEDLINE USE 2711682 TRAY SKIN PREP DRY W/ PREM GLV

## (undated) DEVICE — RESERVOIR AUTOTRNS 3L W/ 150UM RAISE FLTR FOR CELL SAVR

## (undated) DEVICE — NEEDLE SPNL L3.5IN PNK HUB S STL REG WALL FIT STYL W/ QNCKE

## (undated) DEVICE — SUTURE NONABSORBABLE MONOFILAMENT 5-0 C-1 4X36 IN PROLENE M8720

## (undated) DEVICE — SUTURE MONOCRYL SZ 3 0 L18IN ABSRB UD PS 2 3 8 CIR REV CUT NDL MCP497G

## (undated) DEVICE — CONTAINER,SPECIMEN,4OZ,OR STRL: Brand: MEDLINE

## (undated) DEVICE — 6 ML SYRINGE LUER-LOCK TIP: Brand: MONOJECT

## (undated) DEVICE — STRIP,CLOSURE,WOUND,MEDI-STRIP,1/2X4: Brand: MEDLINE

## (undated) DEVICE — SUTURE PERMAHAND SZ 4-0 L12X30IN NONABSORBABLE BLK SILK A303H

## (undated) DEVICE — BLADE ES L6IN ELASTOMERIC COAT INSUL DURABLE BEND UPTO

## (undated) DEVICE — SUTURE PROL SZ 4-0 L36IN NONABSORBABLE BLU L26MM SH 1/2 CIR 8521H

## (undated) DEVICE — ELECTRODE PT RET AD L9FT HI MOIST COND ADH HYDRGEL CORDED

## (undated) DEVICE — BLOOD TRANSFER PACK 600 CC W/ CPLR

## (undated) DEVICE — AGENT HEMOSTATIC SURGIFLOW MATRIX KIT W/THROMBIN

## (undated) DEVICE — BONE SCREW 4675020 LS 5.0X20MM
Type: IMPLANTABLE DEVICE | Site: SPINE LUMBAR | Status: NON-FUNCTIONAL
Brand: ANTERALIGN SPINAL SYSTEM WITH TITAN NANOLOCK SURFACE TECHNOLOGY

## (undated) DEVICE — SUTURE N ABSRB BRAIDED 2-0 OS-4 30 IN GRN ETHBND EXCEL X519H

## (undated) DEVICE — DISCONTINUED USE 394504 SYRINGE LUER LOCK TIP 12 ML

## (undated) DEVICE — PROVIDES A STERILE INTERFACE BETWEEN THE OPERATING ROOM SURGICAL LAMPS (NON-STERILE) AND THE SURGEON OR NURSE (STERILE).: Brand: STERION®CLAMP COVER FABRIC

## (undated) DEVICE — AVANOS* UNIVERSAL BLOCK TRAYS: Brand: AVANOS

## (undated) DEVICE — SUTURE VICRYL SZ 2-0 L18IN ABSRB UD CT-1 L36MM 1/2 CIR J839D

## (undated) DEVICE — SLING ARM SM W75XL138IN POLY COT DP L PKT DBL D RNG HK

## (undated) DEVICE — GAUZE,SPONGE,FLUFF,6"X6.75",STRL,5/TRAY: Brand: MEDLINE

## (undated) DEVICE — DISPOSABLE BIPOLAR CABLE, 12FT (3.6M): Brand: KIRWAN

## (undated) DEVICE — WAX SURG 2.5GM HEMSTAT BNE BEESWAX PARAFFIN ISO PALMITATE

## (undated) DEVICE — SOLUTION SCRB 4OZ 4% CHG CLN BASE FOR PT SKIN ANTISEPSIS

## (undated) DEVICE — RESTRAINT POS UNIV HD NK ALIGN DISP FOR SHLDR PROC

## (undated) DEVICE — 3.0MM PRECISION NEURO (MATCH HEAD)

## (undated) DEVICE — COVER LT HNDL BLU PLAS

## (undated) DEVICE — CANNULA IV 18GA L15IN BLNT FILL LUERLOCK HUB MJCT

## (undated) DEVICE — TOWEL,OR,DSP,ST,NATURAL,DLX,4/PK,20PK/CS: Brand: MEDLINE

## (undated) DEVICE — SPONGE GZ W4XL4IN CELOS POSTOP AVANT

## (undated) DEVICE — SUTURE VICRYL + SZ 2-0 L27IN ABSRB WHT SH 1/2 CIR TAPERCUT VCP417H

## (undated) DEVICE — NEEDLE SPNL 20GA L3.5IN YEL HUB S STL REG WALL FIT STYL W/

## (undated) DEVICE — 3M™ STERI-DRAPE™ U-DRAPE 1015: Brand: STERI-DRAPE™

## (undated) DEVICE — IMPLANTABLE DEVICE
Type: IMPLANTABLE DEVICE | Site: SHOULDER | Status: NON-FUNCTIONAL
Removed: 2021-10-18

## (undated) DEVICE — MARKER SURG SKIN UTIL BLK REG TIP NONSMEARING W/ 6IN RUL

## (undated) DEVICE — Z DISCONTINUED USE 2220306 SUTURE VICRYL + SZ 3-0 L27IN ABSRB WHT CT-1 1/2 CIR VCP258H

## (undated) DEVICE — KIT AUTOTRNS CELL SAVR W/ BOWL CELL SAVR

## (undated) DEVICE — KIT ROBOTIC SPINE DISP MAZORX

## (undated) DEVICE — BLADE ES L4IN INSUL EDGE

## (undated) DEVICE — CLEANER,CAUTERY TIP,2X2",STERILE: Brand: MEDLINE

## (undated) DEVICE — GOWN,SIRUS,NONRNF,SETINSLV,2XL,18/CS: Brand: MEDLINE

## (undated) DEVICE — DUAL CUT SAGITTAL BLADE

## (undated) DEVICE — 4.0MM ROUND FLUTED SOFT TOUCH

## (undated) DEVICE — SYRINGE MED 10ML TRNSLUC BRL PLUNG BLK MRK POLYPR CTRL

## (undated) DEVICE — SVMMC VASC MAJ PK

## (undated) DEVICE — GAUZE,SPONGE,4"X4",16PLY,XRAY,STRL,LF: Brand: MEDLINE

## (undated) DEVICE — PEN: MARKING STD 100/CS: Brand: MEDICAL ACTION INDUSTRIES

## (undated) DEVICE — Device: Brand: SNAP ON SPHERZ

## (undated) DEVICE — TOOL MR8-31TD3030 MR8 TWST DRIL 3MMX30MM: Brand: MIDAS REX